# Patient Record
Sex: FEMALE | Race: WHITE | NOT HISPANIC OR LATINO | Employment: OTHER | URBAN - METROPOLITAN AREA
[De-identification: names, ages, dates, MRNs, and addresses within clinical notes are randomized per-mention and may not be internally consistent; named-entity substitution may affect disease eponyms.]

---

## 2017-03-10 ENCOUNTER — OFFICE VISIT - HEALTHEAST (OUTPATIENT)
Dept: INTERNAL MEDICINE | Facility: CLINIC | Age: 77
End: 2017-03-10

## 2017-03-10 DIAGNOSIS — H35.30 MACULAR DEGENERATION: ICD-10-CM

## 2017-03-10 DIAGNOSIS — I10 ESSENTIAL HYPERTENSION WITH GOAL BLOOD PRESSURE LESS THAN 140/90: ICD-10-CM

## 2017-03-10 DIAGNOSIS — F41.9 ANXIETY: ICD-10-CM

## 2017-03-10 DIAGNOSIS — Z51.81 MEDICATION MONITORING ENCOUNTER: ICD-10-CM

## 2017-03-10 DIAGNOSIS — M81.0 OSTEOPOROSIS: ICD-10-CM

## 2017-03-10 ASSESSMENT — MIFFLIN-ST. JEOR: SCORE: 1015.86

## 2017-03-13 ENCOUNTER — COMMUNICATION - HEALTHEAST (OUTPATIENT)
Dept: INFUSION THERAPY | Age: 77
End: 2017-03-13

## 2017-03-13 ENCOUNTER — COMMUNICATION - HEALTHEAST (OUTPATIENT)
Dept: INTERNAL MEDICINE | Facility: CLINIC | Age: 77
End: 2017-03-13

## 2017-03-16 ENCOUNTER — COMMUNICATION - HEALTHEAST (OUTPATIENT)
Dept: INTERNAL MEDICINE | Facility: CLINIC | Age: 77
End: 2017-03-16

## 2017-03-16 DIAGNOSIS — I10 HTN (HYPERTENSION): ICD-10-CM

## 2017-03-17 ENCOUNTER — COMMUNICATION - HEALTHEAST (OUTPATIENT)
Dept: INFUSION THERAPY | Age: 77
End: 2017-03-17

## 2017-03-28 ENCOUNTER — COMMUNICATION - HEALTHEAST (OUTPATIENT)
Dept: INFUSION THERAPY | Age: 77
End: 2017-03-28

## 2017-04-12 ENCOUNTER — INFUSION - HEALTHEAST (OUTPATIENT)
Dept: INFUSION THERAPY | Age: 77
End: 2017-04-12

## 2017-04-12 DIAGNOSIS — M81.0 OSTEOPOROSIS: ICD-10-CM

## 2017-05-04 ENCOUNTER — COMMUNICATION - HEALTHEAST (OUTPATIENT)
Dept: INTERNAL MEDICINE | Facility: CLINIC | Age: 77
End: 2017-05-04

## 2017-05-04 DIAGNOSIS — R53.83 FATIGUE: ICD-10-CM

## 2017-05-19 ENCOUNTER — COMMUNICATION - HEALTHEAST (OUTPATIENT)
Dept: INTERNAL MEDICINE | Facility: CLINIC | Age: 77
End: 2017-05-19

## 2017-07-14 ENCOUNTER — OFFICE VISIT - HEALTHEAST (OUTPATIENT)
Dept: INTERNAL MEDICINE | Facility: CLINIC | Age: 77
End: 2017-07-14

## 2017-07-14 DIAGNOSIS — Z51.81 MEDICATION MONITORING ENCOUNTER: ICD-10-CM

## 2017-07-14 DIAGNOSIS — H35.30 MACULAR DEGENERATION: ICD-10-CM

## 2017-07-14 DIAGNOSIS — I10 ESSENTIAL HYPERTENSION WITH GOAL BLOOD PRESSURE LESS THAN 140/90: ICD-10-CM

## 2017-07-14 DIAGNOSIS — M81.0 OSTEOPOROSIS: ICD-10-CM

## 2017-07-14 DIAGNOSIS — F41.9 ANXIETY: ICD-10-CM

## 2017-07-14 DIAGNOSIS — E78.00 HYPERCHOLESTEROLEMIA: ICD-10-CM

## 2017-07-14 ASSESSMENT — MIFFLIN-ST. JEOR: SCORE: 993.18

## 2017-07-17 ENCOUNTER — COMMUNICATION - HEALTHEAST (OUTPATIENT)
Dept: INTERNAL MEDICINE | Facility: CLINIC | Age: 77
End: 2017-07-17

## 2017-10-03 ENCOUNTER — COMMUNICATION - HEALTHEAST (OUTPATIENT)
Dept: SCHEDULING | Facility: CLINIC | Age: 77
End: 2017-10-03

## 2017-10-03 DIAGNOSIS — F41.9 ANXIETY: ICD-10-CM

## 2017-10-16 ENCOUNTER — COMMUNICATION - HEALTHEAST (OUTPATIENT)
Dept: INTERNAL MEDICINE | Facility: CLINIC | Age: 77
End: 2017-10-16

## 2017-10-16 ENCOUNTER — OFFICE VISIT - HEALTHEAST (OUTPATIENT)
Dept: INTERNAL MEDICINE | Facility: CLINIC | Age: 77
End: 2017-10-16

## 2017-10-16 DIAGNOSIS — I10 ESSENTIAL HYPERTENSION: ICD-10-CM

## 2017-10-16 DIAGNOSIS — Z51.81 MEDICATION MONITORING ENCOUNTER: ICD-10-CM

## 2017-10-16 DIAGNOSIS — I49.9 IRREGULAR HEART RATE: ICD-10-CM

## 2017-10-16 DIAGNOSIS — Z87.891 HISTORY OF TOBACCO ABUSE: ICD-10-CM

## 2017-10-16 DIAGNOSIS — H35.30 MACULAR DEGENERATION: ICD-10-CM

## 2017-10-16 DIAGNOSIS — F41.9 ANXIETY: ICD-10-CM

## 2017-10-16 DIAGNOSIS — M81.0 OSTEOPOROSIS, UNSPECIFIED OSTEOPOROSIS TYPE, UNSPECIFIED PATHOLOGICAL FRACTURE PRESENCE: ICD-10-CM

## 2017-10-16 DIAGNOSIS — J43.9 PULMONARY EMPHYSEMA, UNSPECIFIED EMPHYSEMA TYPE (H): ICD-10-CM

## 2017-10-16 DIAGNOSIS — R53.83 FATIGUE, UNSPECIFIED TYPE: ICD-10-CM

## 2017-10-16 DIAGNOSIS — R06.02 SHORT OF BREATH ON EXERTION: ICD-10-CM

## 2017-10-16 DIAGNOSIS — E55.9 VITAMIN D DEFICIENCY: ICD-10-CM

## 2017-10-16 LAB
ATRIAL RATE - MUSE: 65 BPM
DIASTOLIC BLOOD PRESSURE - MUSE: NORMAL MMHG
INTERPRETATION ECG - MUSE: NORMAL
P AXIS - MUSE: 13 DEGREES
PR INTERVAL - MUSE: 138 MS
QRS DURATION - MUSE: 86 MS
QT - MUSE: 432 MS
QTC - MUSE: 449 MS
R AXIS - MUSE: 75 DEGREES
SYSTOLIC BLOOD PRESSURE - MUSE: NORMAL MMHG
T AXIS - MUSE: 73 DEGREES
VENTRICULAR RATE- MUSE: 65 BPM

## 2017-10-16 ASSESSMENT — MIFFLIN-ST. JEOR: SCORE: 988.65

## 2017-10-17 ENCOUNTER — COMMUNICATION - HEALTHEAST (OUTPATIENT)
Dept: INTERNAL MEDICINE | Facility: CLINIC | Age: 77
End: 2017-10-17

## 2017-10-18 ENCOUNTER — COMMUNICATION - HEALTHEAST (OUTPATIENT)
Dept: INTERNAL MEDICINE | Facility: CLINIC | Age: 77
End: 2017-10-18

## 2017-10-18 DIAGNOSIS — J44.9 CHRONIC OBSTRUCTIVE PULMONARY DISEASE, UNSPECIFIED COPD TYPE (H): ICD-10-CM

## 2017-11-09 ENCOUNTER — COMMUNICATION - HEALTHEAST (OUTPATIENT)
Dept: INTERNAL MEDICINE | Facility: CLINIC | Age: 77
End: 2017-11-09

## 2017-11-09 ENCOUNTER — HOSPITAL ENCOUNTER (OUTPATIENT)
Dept: CT IMAGING | Facility: HOSPITAL | Age: 77
Discharge: HOME | End: 2017-11-09
Attending: INTERNAL MEDICINE

## 2017-11-09 ENCOUNTER — OFFICE VISIT - HEALTHEAST (OUTPATIENT)
Dept: PULMONOLOGY | Facility: OTHER | Age: 77
End: 2017-11-09

## 2017-11-09 DIAGNOSIS — F17.211 CIGARETTE NICOTINE DEPENDENCE IN REMISSION: ICD-10-CM

## 2017-11-09 DIAGNOSIS — Z87.891 HISTORY OF TOBACCO ABUSE: ICD-10-CM

## 2017-11-09 DIAGNOSIS — R06.02 SHORT OF BREATH ON EXERTION: ICD-10-CM

## 2017-11-13 ENCOUNTER — AMBULATORY - HEALTHEAST (OUTPATIENT)
Dept: PULMONOLOGY | Facility: OTHER | Age: 77
End: 2017-11-13

## 2017-11-13 ENCOUNTER — OFFICE VISIT - HEALTHEAST (OUTPATIENT)
Dept: INTERNAL MEDICINE | Facility: CLINIC | Age: 77
End: 2017-11-13

## 2017-11-13 DIAGNOSIS — J43.9 EMPHYSEMA LUNG (H): ICD-10-CM

## 2017-11-13 DIAGNOSIS — I10 ESSENTIAL HYPERTENSION: ICD-10-CM

## 2017-11-13 DIAGNOSIS — J43.9 PULMONARY EMPHYSEMA, UNSPECIFIED EMPHYSEMA TYPE (H): ICD-10-CM

## 2017-11-13 ASSESSMENT — MIFFLIN-ST. JEOR: SCORE: 984.11

## 2017-11-14 ENCOUNTER — COMMUNICATION - HEALTHEAST (OUTPATIENT)
Dept: PULMONOLOGY | Facility: OTHER | Age: 77
End: 2017-11-14

## 2017-12-29 ENCOUNTER — RECORDS - HEALTHEAST (OUTPATIENT)
Dept: ADMINISTRATIVE | Facility: OTHER | Age: 77
End: 2017-12-29

## 2017-12-29 ENCOUNTER — OFFICE VISIT - HEALTHEAST (OUTPATIENT)
Dept: PULMONOLOGY | Facility: OTHER | Age: 77
End: 2017-12-29

## 2017-12-29 DIAGNOSIS — J43.2 CENTRILOBULAR EMPHYSEMA (H): ICD-10-CM

## 2017-12-29 ASSESSMENT — MIFFLIN-ST. JEOR: SCORE: 996.81

## 2018-02-26 ENCOUNTER — OFFICE VISIT - HEALTHEAST (OUTPATIENT)
Dept: PULMONOLOGY | Facility: OTHER | Age: 78
End: 2018-02-26

## 2018-02-26 DIAGNOSIS — J43.2 CENTRILOBULAR EMPHYSEMA (H): ICD-10-CM

## 2018-03-02 ENCOUNTER — COMMUNICATION - HEALTHEAST (OUTPATIENT)
Dept: SCHEDULING | Facility: CLINIC | Age: 78
End: 2018-03-02

## 2018-03-06 ENCOUNTER — COMMUNICATION - HEALTHEAST (OUTPATIENT)
Dept: INTERNAL MEDICINE | Facility: CLINIC | Age: 78
End: 2018-03-06

## 2018-03-06 DIAGNOSIS — I10 HTN (HYPERTENSION): ICD-10-CM

## 2018-03-07 ENCOUNTER — COMMUNICATION - HEALTHEAST (OUTPATIENT)
Dept: INTERNAL MEDICINE | Facility: CLINIC | Age: 78
End: 2018-03-07

## 2018-03-07 DIAGNOSIS — F41.9 ANXIETY: ICD-10-CM

## 2018-03-09 ENCOUNTER — OFFICE VISIT - HEALTHEAST (OUTPATIENT)
Dept: INTERNAL MEDICINE | Facility: CLINIC | Age: 78
End: 2018-03-09

## 2018-03-09 DIAGNOSIS — Z51.81 MEDICATION MONITORING ENCOUNTER: ICD-10-CM

## 2018-03-09 DIAGNOSIS — I10 ESSENTIAL HYPERTENSION: ICD-10-CM

## 2018-03-09 DIAGNOSIS — F41.9 ANXIETY: ICD-10-CM

## 2018-03-09 DIAGNOSIS — J43.9 PULMONARY EMPHYSEMA, UNSPECIFIED EMPHYSEMA TYPE (H): ICD-10-CM

## 2018-03-09 DIAGNOSIS — H35.30 MACULAR DEGENERATION: ICD-10-CM

## 2018-03-09 LAB
ANION GAP SERPL CALCULATED.3IONS-SCNC: 11 MMOL/L (ref 5–18)
BUN SERPL-MCNC: 12 MG/DL (ref 8–28)
CALCIUM SERPL-MCNC: 9 MG/DL (ref 8.5–10.5)
CHLORIDE BLD-SCNC: 99 MMOL/L (ref 98–107)
CO2 SERPL-SCNC: 26 MMOL/L (ref 22–31)
CREAT SERPL-MCNC: 0.75 MG/DL (ref 0.6–1.1)
GFR SERPL CREATININE-BSD FRML MDRD: >60 ML/MIN/1.73M2
GLUCOSE BLD-MCNC: 98 MG/DL (ref 70–125)
POTASSIUM BLD-SCNC: 4.1 MMOL/L (ref 3.5–5)
SODIUM SERPL-SCNC: 136 MMOL/L (ref 136–145)

## 2018-03-09 ASSESSMENT — MIFFLIN-ST. JEOR: SCORE: 979.58

## 2018-03-11 ENCOUNTER — COMMUNICATION - HEALTHEAST (OUTPATIENT)
Dept: INTERNAL MEDICINE | Facility: CLINIC | Age: 78
End: 2018-03-11

## 2018-04-27 ENCOUNTER — COMMUNICATION - HEALTHEAST (OUTPATIENT)
Dept: INTERNAL MEDICINE | Facility: CLINIC | Age: 78
End: 2018-04-27

## 2018-04-27 DIAGNOSIS — R53.83 FATIGUE: ICD-10-CM

## 2018-04-27 DIAGNOSIS — I10 HTN (HYPERTENSION): ICD-10-CM

## 2018-07-26 ENCOUNTER — COMMUNICATION - HEALTHEAST (OUTPATIENT)
Dept: INTERNAL MEDICINE | Facility: CLINIC | Age: 78
End: 2018-07-26

## 2018-07-26 DIAGNOSIS — I10 HTN (HYPERTENSION): ICD-10-CM

## 2018-07-26 DIAGNOSIS — F41.9 ANXIETY: ICD-10-CM

## 2018-07-30 ENCOUNTER — COMMUNICATION - HEALTHEAST (OUTPATIENT)
Dept: INTERNAL MEDICINE | Facility: CLINIC | Age: 78
End: 2018-07-30

## 2018-08-01 ENCOUNTER — OFFICE VISIT - HEALTHEAST (OUTPATIENT)
Dept: INTERNAL MEDICINE | Facility: CLINIC | Age: 78
End: 2018-08-01

## 2018-08-01 DIAGNOSIS — F41.9 ANXIETY: ICD-10-CM

## 2018-08-01 DIAGNOSIS — I10 ESSENTIAL HYPERTENSION: ICD-10-CM

## 2018-08-01 DIAGNOSIS — Z51.81 MEDICATION MONITORING ENCOUNTER: ICD-10-CM

## 2018-08-01 DIAGNOSIS — J43.9 PULMONARY EMPHYSEMA, UNSPECIFIED EMPHYSEMA TYPE (H): ICD-10-CM

## 2018-08-01 LAB
ANION GAP SERPL CALCULATED.3IONS-SCNC: 12 MMOL/L (ref 5–18)
BUN SERPL-MCNC: 5 MG/DL (ref 8–28)
CALCIUM SERPL-MCNC: 9.4 MG/DL (ref 8.5–10.5)
CHLORIDE BLD-SCNC: 99 MMOL/L (ref 98–107)
CO2 SERPL-SCNC: 24 MMOL/L (ref 22–31)
CREAT SERPL-MCNC: 0.75 MG/DL (ref 0.6–1.1)
GFR SERPL CREATININE-BSD FRML MDRD: >60 ML/MIN/1.73M2
GLUCOSE BLD-MCNC: 113 MG/DL (ref 70–125)
POTASSIUM BLD-SCNC: 4.2 MMOL/L (ref 3.5–5)
SODIUM SERPL-SCNC: 135 MMOL/L (ref 136–145)

## 2018-08-01 ASSESSMENT — MIFFLIN-ST. JEOR: SCORE: 997.72

## 2018-08-02 ENCOUNTER — COMMUNICATION - HEALTHEAST (OUTPATIENT)
Dept: INTERNAL MEDICINE | Facility: CLINIC | Age: 78
End: 2018-08-02

## 2018-08-10 ENCOUNTER — COMMUNICATION - HEALTHEAST (OUTPATIENT)
Dept: INTERNAL MEDICINE | Facility: CLINIC | Age: 78
End: 2018-08-10

## 2018-08-15 ENCOUNTER — OFFICE VISIT - HEALTHEAST (OUTPATIENT)
Dept: INTERNAL MEDICINE | Facility: CLINIC | Age: 78
End: 2018-08-15

## 2018-08-15 DIAGNOSIS — I10 HTN (HYPERTENSION): ICD-10-CM

## 2018-08-15 DIAGNOSIS — N39.0 URINARY TRACT INFECTION WITHOUT HEMATURIA, SITE UNSPECIFIED: ICD-10-CM

## 2018-08-15 DIAGNOSIS — J43.9 PULMONARY EMPHYSEMA, UNSPECIFIED EMPHYSEMA TYPE (H): ICD-10-CM

## 2018-08-15 ASSESSMENT — MIFFLIN-ST. JEOR: SCORE: 1006.79

## 2018-09-25 ENCOUNTER — OFFICE VISIT - HEALTHEAST (OUTPATIENT)
Dept: INTERNAL MEDICINE | Facility: CLINIC | Age: 78
End: 2018-09-25

## 2018-09-25 DIAGNOSIS — Z51.81 MEDICATION MONITORING ENCOUNTER: ICD-10-CM

## 2018-09-25 DIAGNOSIS — F41.9 ANXIETY: ICD-10-CM

## 2018-09-25 DIAGNOSIS — H35.30 MACULAR DEGENERATION: ICD-10-CM

## 2018-09-25 DIAGNOSIS — E87.1 HYPONATREMIA: ICD-10-CM

## 2018-09-25 DIAGNOSIS — J43.9 PULMONARY EMPHYSEMA, UNSPECIFIED EMPHYSEMA TYPE (H): ICD-10-CM

## 2018-09-25 DIAGNOSIS — I10 HTN (HYPERTENSION): ICD-10-CM

## 2018-09-25 LAB
ANION GAP SERPL CALCULATED.3IONS-SCNC: 10 MMOL/L (ref 5–18)
BUN SERPL-MCNC: 8 MG/DL (ref 8–28)
CALCIUM SERPL-MCNC: 9.5 MG/DL (ref 8.5–10.5)
CHLORIDE BLD-SCNC: 97 MMOL/L (ref 98–107)
CO2 SERPL-SCNC: 26 MMOL/L (ref 22–31)
CREAT SERPL-MCNC: 0.77 MG/DL (ref 0.6–1.1)
GFR SERPL CREATININE-BSD FRML MDRD: >60 ML/MIN/1.73M2
GLUCOSE BLD-MCNC: 113 MG/DL (ref 70–125)
POTASSIUM BLD-SCNC: 4.4 MMOL/L (ref 3.5–5)
SODIUM SERPL-SCNC: 133 MMOL/L (ref 136–145)

## 2018-09-25 ASSESSMENT — MIFFLIN-ST. JEOR: SCORE: 1002.26

## 2018-09-26 ENCOUNTER — COMMUNICATION - HEALTHEAST (OUTPATIENT)
Dept: INTERNAL MEDICINE | Facility: CLINIC | Age: 78
End: 2018-09-26

## 2018-10-09 ENCOUNTER — COMMUNICATION - HEALTHEAST (OUTPATIENT)
Dept: SCHEDULING | Facility: CLINIC | Age: 78
End: 2018-10-09

## 2018-10-09 DIAGNOSIS — J44.1 COPD EXACERBATION (H): ICD-10-CM

## 2018-11-07 ENCOUNTER — OFFICE VISIT - HEALTHEAST (OUTPATIENT)
Dept: PULMONOLOGY | Facility: OTHER | Age: 78
End: 2018-11-07

## 2018-11-07 DIAGNOSIS — J20.9 ACUTE BRONCHITIS, UNSPECIFIED ORGANISM: ICD-10-CM

## 2018-11-07 DIAGNOSIS — J43.2 CENTRILOBULAR EMPHYSEMA (H): ICD-10-CM

## 2018-11-07 DIAGNOSIS — J43.9 PULMONARY EMPHYSEMA, UNSPECIFIED EMPHYSEMA TYPE (H): ICD-10-CM

## 2018-12-19 ENCOUNTER — OFFICE VISIT - HEALTHEAST (OUTPATIENT)
Dept: PULMONOLOGY | Facility: OTHER | Age: 78
End: 2018-12-19

## 2018-12-19 DIAGNOSIS — R91.8 PULMONARY NODULES: ICD-10-CM

## 2018-12-19 DIAGNOSIS — J43.2 CENTRILOBULAR EMPHYSEMA (H): ICD-10-CM

## 2018-12-19 ASSESSMENT — MIFFLIN-ST. JEOR: SCORE: 1015.86

## 2018-12-31 ENCOUNTER — AMBULATORY - HEALTHEAST (OUTPATIENT)
Dept: PULMONOLOGY | Facility: OTHER | Age: 78
End: 2018-12-31

## 2018-12-31 DIAGNOSIS — F17.211 CIGARETTE NICOTINE DEPENDENCE IN REMISSION: ICD-10-CM

## 2018-12-31 DIAGNOSIS — F17.210 CIGARETTE NICOTINE DEPENDENCE WITHOUT COMPLICATION: ICD-10-CM

## 2018-12-31 DIAGNOSIS — F17.210 CIGARETTE NICOTINE DEPENDENCE, UNCOMPLICATED: ICD-10-CM

## 2018-12-31 DIAGNOSIS — F17.201 NICOTINE DEPENDENCE IN REMISSION, UNSPECIFIED NICOTINE PRODUCT TYPE: ICD-10-CM

## 2019-01-22 ENCOUNTER — COMMUNICATION - HEALTHEAST (OUTPATIENT)
Dept: INTERNAL MEDICINE | Facility: CLINIC | Age: 79
End: 2019-01-22

## 2019-01-28 ENCOUNTER — COMMUNICATION - HEALTHEAST (OUTPATIENT)
Dept: INTERNAL MEDICINE | Facility: CLINIC | Age: 79
End: 2019-01-28

## 2019-01-28 DIAGNOSIS — F41.9 ANXIETY: ICD-10-CM

## 2019-02-26 ENCOUNTER — OFFICE VISIT - HEALTHEAST (OUTPATIENT)
Dept: INTERNAL MEDICINE | Facility: CLINIC | Age: 79
End: 2019-02-26

## 2019-02-26 DIAGNOSIS — J43.9 PULMONARY EMPHYSEMA, UNSPECIFIED EMPHYSEMA TYPE (H): ICD-10-CM

## 2019-02-26 DIAGNOSIS — Z51.81 MEDICATION MONITORING ENCOUNTER: ICD-10-CM

## 2019-02-26 DIAGNOSIS — E55.9 VITAMIN D DEFICIENCY: ICD-10-CM

## 2019-02-26 DIAGNOSIS — H35.30 MACULAR DEGENERATION, UNSPECIFIED LATERALITY, UNSPECIFIED TYPE: ICD-10-CM

## 2019-02-26 DIAGNOSIS — M81.0 OSTEOPOROSIS, UNSPECIFIED OSTEOPOROSIS TYPE, UNSPECIFIED PATHOLOGICAL FRACTURE PRESENCE: ICD-10-CM

## 2019-02-26 DIAGNOSIS — I10 ESSENTIAL HYPERTENSION: ICD-10-CM

## 2019-02-26 LAB
ANION GAP SERPL CALCULATED.3IONS-SCNC: 15 MMOL/L (ref 5–18)
BUN SERPL-MCNC: 8 MG/DL (ref 8–28)
CALCIUM SERPL-MCNC: 9.4 MG/DL (ref 8.5–10.5)
CHLORIDE BLD-SCNC: 99 MMOL/L (ref 98–107)
CO2 SERPL-SCNC: 21 MMOL/L (ref 22–31)
CREAT SERPL-MCNC: 0.86 MG/DL (ref 0.6–1.1)
ERYTHROCYTE [DISTWIDTH] IN BLOOD BY AUTOMATED COUNT: 12.3 % (ref 11–14.5)
GFR SERPL CREATININE-BSD FRML MDRD: >60 ML/MIN/1.73M2
GLUCOSE BLD-MCNC: 110 MG/DL (ref 70–125)
HCT VFR BLD AUTO: 41.4 % (ref 35–47)
HGB BLD-MCNC: 13.6 G/DL (ref 12–16)
MCH RBC QN AUTO: 31.1 PG (ref 27–34)
MCHC RBC AUTO-ENTMCNC: 32.9 G/DL (ref 32–36)
MCV RBC AUTO: 95 FL (ref 80–100)
PLATELET # BLD AUTO: 335 THOU/UL (ref 140–440)
PMV BLD AUTO: 7.2 FL (ref 7–10)
POTASSIUM BLD-SCNC: 4.4 MMOL/L (ref 3.5–5)
RBC # BLD AUTO: 4.37 MILL/UL (ref 3.8–5.4)
SODIUM SERPL-SCNC: 135 MMOL/L (ref 136–145)
WBC: 13.4 THOU/UL (ref 4–11)

## 2019-02-27 LAB — 25(OH)D3 SERPL-MCNC: 21.4 NG/ML (ref 30–80)

## 2019-02-28 ENCOUNTER — COMMUNICATION - HEALTHEAST (OUTPATIENT)
Dept: INTERNAL MEDICINE | Facility: CLINIC | Age: 79
End: 2019-02-28

## 2019-03-05 ENCOUNTER — COMMUNICATION - HEALTHEAST (OUTPATIENT)
Dept: INTERNAL MEDICINE | Facility: CLINIC | Age: 79
End: 2019-03-05

## 2019-03-05 DIAGNOSIS — I10 HTN (HYPERTENSION): ICD-10-CM

## 2019-03-19 ENCOUNTER — AMBULATORY - HEALTHEAST (OUTPATIENT)
Dept: PULMONOLOGY | Facility: OTHER | Age: 79
End: 2019-03-19

## 2019-03-19 ENCOUNTER — HOSPITAL ENCOUNTER (OUTPATIENT)
Dept: CT IMAGING | Facility: HOSPITAL | Age: 79
Discharge: HOME | End: 2019-03-19
Attending: INTERNAL MEDICINE

## 2019-03-19 DIAGNOSIS — F17.210 CIGARETTE NICOTINE DEPENDENCE, UNCOMPLICATED: ICD-10-CM

## 2019-03-27 ENCOUNTER — OFFICE VISIT - HEALTHEAST (OUTPATIENT)
Dept: PULMONOLOGY | Facility: OTHER | Age: 79
End: 2019-03-27

## 2019-03-27 DIAGNOSIS — J43.2 CENTRILOBULAR EMPHYSEMA (H): ICD-10-CM

## 2019-03-27 ASSESSMENT — MIFFLIN-ST. JEOR: SCORE: 1002.26

## 2019-04-29 ENCOUNTER — COMMUNICATION - HEALTHEAST (OUTPATIENT)
Dept: INTERNAL MEDICINE | Facility: CLINIC | Age: 79
End: 2019-04-29

## 2019-04-29 DIAGNOSIS — R53.83 FATIGUE: ICD-10-CM

## 2019-05-25 ENCOUNTER — COMMUNICATION - HEALTHEAST (OUTPATIENT)
Dept: INTERNAL MEDICINE | Facility: CLINIC | Age: 79
End: 2019-05-25

## 2019-05-25 DIAGNOSIS — R11.0 NAUSEA: ICD-10-CM

## 2019-07-02 ENCOUNTER — OFFICE VISIT - HEALTHEAST (OUTPATIENT)
Dept: INTERNAL MEDICINE | Facility: CLINIC | Age: 79
End: 2019-07-02

## 2019-07-02 DIAGNOSIS — I10 ESSENTIAL HYPERTENSION: ICD-10-CM

## 2019-07-02 DIAGNOSIS — Z78.0 POSTMENOPAUSAL STATUS: ICD-10-CM

## 2019-07-02 DIAGNOSIS — E55.9 VITAMIN D DEFICIENCY: ICD-10-CM

## 2019-07-02 DIAGNOSIS — F41.9 ANXIETY: ICD-10-CM

## 2019-07-02 DIAGNOSIS — H35.30 MACULAR DEGENERATION, UNSPECIFIED LATERALITY, UNSPECIFIED TYPE: ICD-10-CM

## 2019-07-02 DIAGNOSIS — Z79.52 LONG TERM SYSTEMIC STEROID USER: ICD-10-CM

## 2019-07-02 DIAGNOSIS — M81.0 OSTEOPOROSIS, UNSPECIFIED OSTEOPOROSIS TYPE, UNSPECIFIED PATHOLOGICAL FRACTURE PRESENCE: ICD-10-CM

## 2019-07-02 DIAGNOSIS — R11.0 NAUSEA: ICD-10-CM

## 2019-07-02 DIAGNOSIS — E87.1 HYPONATREMIA: ICD-10-CM

## 2019-07-02 DIAGNOSIS — J43.9 PULMONARY EMPHYSEMA, UNSPECIFIED EMPHYSEMA TYPE (H): ICD-10-CM

## 2019-07-02 LAB
ANION GAP SERPL CALCULATED.3IONS-SCNC: 9 MMOL/L (ref 5–18)
BUN SERPL-MCNC: 7 MG/DL (ref 8–28)
CALCIUM SERPL-MCNC: 10.2 MG/DL (ref 8.5–10.5)
CHLORIDE BLD-SCNC: 96 MMOL/L (ref 98–107)
CO2 SERPL-SCNC: 29 MMOL/L (ref 22–31)
CREAT SERPL-MCNC: 0.82 MG/DL (ref 0.6–1.1)
GFR SERPL CREATININE-BSD FRML MDRD: >60 ML/MIN/1.73M2
GLUCOSE BLD-MCNC: 134 MG/DL (ref 70–125)
POTASSIUM BLD-SCNC: 4.2 MMOL/L (ref 3.5–5)
SODIUM SERPL-SCNC: 134 MMOL/L (ref 136–145)

## 2019-07-03 ENCOUNTER — COMMUNICATION - HEALTHEAST (OUTPATIENT)
Dept: INTERNAL MEDICINE | Facility: CLINIC | Age: 79
End: 2019-07-03

## 2019-07-03 LAB — 25(OH)D3 SERPL-MCNC: 25.8 NG/ML (ref 30–80)

## 2019-08-15 ENCOUNTER — OFFICE VISIT - HEALTHEAST (OUTPATIENT)
Dept: PULMONOLOGY | Facility: OTHER | Age: 79
End: 2019-08-15

## 2019-08-15 DIAGNOSIS — J43.2 CENTRILOBULAR EMPHYSEMA (H): ICD-10-CM

## 2019-08-26 ENCOUNTER — COMMUNICATION - HEALTHEAST (OUTPATIENT)
Dept: INTERNAL MEDICINE | Facility: CLINIC | Age: 79
End: 2019-08-26

## 2019-08-26 DIAGNOSIS — I10 HTN (HYPERTENSION): ICD-10-CM

## 2019-08-26 DIAGNOSIS — R53.83 FATIGUE: ICD-10-CM

## 2019-10-03 ENCOUNTER — OFFICE VISIT - HEALTHEAST (OUTPATIENT)
Dept: INTERNAL MEDICINE | Facility: CLINIC | Age: 79
End: 2019-10-03

## 2019-10-03 DIAGNOSIS — J43.9 PULMONARY EMPHYSEMA, UNSPECIFIED EMPHYSEMA TYPE (H): ICD-10-CM

## 2019-10-03 DIAGNOSIS — E55.9 VITAMIN D DEFICIENCY: ICD-10-CM

## 2019-10-03 DIAGNOSIS — Z23 NEED FOR IMMUNIZATION AGAINST INFLUENZA: ICD-10-CM

## 2019-10-03 DIAGNOSIS — E87.1 HYPONATREMIA: ICD-10-CM

## 2019-10-03 DIAGNOSIS — I10 ESSENTIAL HYPERTENSION: ICD-10-CM

## 2019-10-03 DIAGNOSIS — F41.9 ANXIETY: ICD-10-CM

## 2019-10-03 DIAGNOSIS — H35.30 MACULAR DEGENERATION, UNSPECIFIED LATERALITY, UNSPECIFIED TYPE: ICD-10-CM

## 2019-11-22 ENCOUNTER — AMBULATORY - HEALTHEAST (OUTPATIENT)
Dept: PULMONOLOGY | Facility: OTHER | Age: 79
End: 2019-11-22

## 2019-11-22 DIAGNOSIS — J43.2 CENTRILOBULAR EMPHYSEMA (H): ICD-10-CM

## 2019-12-26 ENCOUNTER — OFFICE VISIT - HEALTHEAST (OUTPATIENT)
Dept: INTERNAL MEDICINE | Facility: CLINIC | Age: 79
End: 2019-12-26

## 2019-12-26 DIAGNOSIS — I10 ESSENTIAL HYPERTENSION: ICD-10-CM

## 2019-12-26 DIAGNOSIS — E87.1 HYPONATREMIA: ICD-10-CM

## 2019-12-26 DIAGNOSIS — H35.30 MACULAR DEGENERATION, UNSPECIFIED LATERALITY, UNSPECIFIED TYPE: ICD-10-CM

## 2019-12-26 DIAGNOSIS — E55.9 VITAMIN D DEFICIENCY: ICD-10-CM

## 2019-12-26 DIAGNOSIS — R53.83 FATIGUE, UNSPECIFIED TYPE: ICD-10-CM

## 2019-12-26 DIAGNOSIS — F41.9 ANXIETY: ICD-10-CM

## 2019-12-26 DIAGNOSIS — Z51.81 MEDICATION MONITORING ENCOUNTER: ICD-10-CM

## 2019-12-26 DIAGNOSIS — J43.9 PULMONARY EMPHYSEMA, UNSPECIFIED EMPHYSEMA TYPE (H): ICD-10-CM

## 2019-12-26 LAB
ALBUMIN SERPL-MCNC: 4.1 G/DL (ref 3.5–5)
ALP SERPL-CCNC: 60 U/L (ref 45–120)
ALT SERPL W P-5'-P-CCNC: 19 U/L (ref 0–45)
ANION GAP SERPL CALCULATED.3IONS-SCNC: 16 MMOL/L (ref 5–18)
AST SERPL W P-5'-P-CCNC: 21 U/L (ref 0–40)
BILIRUB SERPL-MCNC: 0.5 MG/DL (ref 0–1)
BUN SERPL-MCNC: 13 MG/DL (ref 8–28)
CALCIUM SERPL-MCNC: 9.4 MG/DL (ref 8.5–10.5)
CHLORIDE BLD-SCNC: 94 MMOL/L (ref 98–107)
CO2 SERPL-SCNC: 28 MMOL/L (ref 22–31)
CREAT SERPL-MCNC: 0.89 MG/DL (ref 0.6–1.1)
ERYTHROCYTE [DISTWIDTH] IN BLOOD BY AUTOMATED COUNT: 12.6 % (ref 11–14.5)
GFR SERPL CREATININE-BSD FRML MDRD: >60 ML/MIN/1.73M2
GLUCOSE BLD-MCNC: 123 MG/DL (ref 70–125)
HCT VFR BLD AUTO: 43.9 % (ref 35–47)
HGB BLD-MCNC: 14.1 G/DL (ref 12–16)
MCH RBC QN AUTO: 29.6 PG (ref 27–34)
MCHC RBC AUTO-ENTMCNC: 32 G/DL (ref 32–36)
MCV RBC AUTO: 92 FL (ref 80–100)
PLATELET # BLD AUTO: 433 THOU/UL (ref 140–440)
PMV BLD AUTO: 7.2 FL (ref 7–10)
POTASSIUM BLD-SCNC: 3.3 MMOL/L (ref 3.5–5)
PROT SERPL-MCNC: 6.9 G/DL (ref 6–8)
RBC # BLD AUTO: 4.75 MILL/UL (ref 3.8–5.4)
SODIUM SERPL-SCNC: 138 MMOL/L (ref 136–145)
TSH SERPL DL<=0.005 MIU/L-ACNC: 2.21 UIU/ML (ref 0.3–5)
WBC: 12.9 THOU/UL (ref 4–11)

## 2019-12-27 ENCOUNTER — COMMUNICATION - HEALTHEAST (OUTPATIENT)
Dept: INTERNAL MEDICINE | Facility: CLINIC | Age: 79
End: 2019-12-27

## 2020-02-10 ENCOUNTER — AMBULATORY - HEALTHEAST (OUTPATIENT)
Dept: PULMONOLOGY | Facility: OTHER | Age: 80
End: 2020-02-10

## 2020-02-10 DIAGNOSIS — J43.9 PULMONARY EMPHYSEMA, UNSPECIFIED EMPHYSEMA TYPE (H): ICD-10-CM

## 2020-02-28 ENCOUNTER — COMMUNICATION - HEALTHEAST (OUTPATIENT)
Dept: INTERNAL MEDICINE | Facility: CLINIC | Age: 80
End: 2020-02-28

## 2020-02-28 DIAGNOSIS — I10 HTN (HYPERTENSION): ICD-10-CM

## 2020-04-13 ENCOUNTER — COMMUNICATION - HEALTHEAST (OUTPATIENT)
Dept: INTERNAL MEDICINE | Facility: CLINIC | Age: 80
End: 2020-04-13

## 2020-04-13 DIAGNOSIS — F41.9 ANXIETY: ICD-10-CM

## 2020-07-23 ENCOUNTER — OFFICE VISIT - HEALTHEAST (OUTPATIENT)
Dept: INTERNAL MEDICINE | Facility: CLINIC | Age: 80
End: 2020-07-23

## 2020-07-23 DIAGNOSIS — F41.9 ANXIETY: ICD-10-CM

## 2020-07-23 DIAGNOSIS — E55.9 VITAMIN D DEFICIENCY: ICD-10-CM

## 2020-07-23 DIAGNOSIS — Z51.81 ENCOUNTER FOR THERAPEUTIC DRUG MONITORING: ICD-10-CM

## 2020-07-23 DIAGNOSIS — J43.9 PULMONARY EMPHYSEMA, UNSPECIFIED EMPHYSEMA TYPE (H): ICD-10-CM

## 2020-07-23 DIAGNOSIS — I10 ESSENTIAL HYPERTENSION: ICD-10-CM

## 2020-07-23 DIAGNOSIS — H35.30 MACULAR DEGENERATION, UNSPECIFIED LATERALITY, UNSPECIFIED TYPE: ICD-10-CM

## 2020-07-23 DIAGNOSIS — E87.1 HYPONATREMIA: ICD-10-CM

## 2020-07-23 RX ORDER — SODIUM CHLORIDE 1 G/1
1 TABLET ORAL DAILY
Refills: 0 | Status: SHIPPED
Start: 2020-07-23 | End: 2021-09-17

## 2020-07-27 ENCOUNTER — COMMUNICATION - HEALTHEAST (OUTPATIENT)
Dept: INTERNAL MEDICINE | Facility: CLINIC | Age: 80
End: 2020-07-27

## 2020-07-27 DIAGNOSIS — F41.9 ANXIETY: ICD-10-CM

## 2020-07-30 ENCOUNTER — COMMUNICATION - HEALTHEAST (OUTPATIENT)
Dept: INTERNAL MEDICINE | Facility: CLINIC | Age: 80
End: 2020-07-30

## 2020-07-31 ENCOUNTER — COMMUNICATION - HEALTHEAST (OUTPATIENT)
Dept: INTERNAL MEDICINE | Facility: CLINIC | Age: 80
End: 2020-07-31

## 2020-07-31 DIAGNOSIS — F41.9 ANXIETY: ICD-10-CM

## 2020-08-03 ENCOUNTER — AMBULATORY - HEALTHEAST (OUTPATIENT)
Dept: LAB | Facility: CLINIC | Age: 80
End: 2020-08-03

## 2020-08-03 DIAGNOSIS — Z51.81 ENCOUNTER FOR THERAPEUTIC DRUG MONITORING: ICD-10-CM

## 2020-08-03 DIAGNOSIS — E87.1 HYPONATREMIA: ICD-10-CM

## 2020-08-03 LAB
ANION GAP SERPL CALCULATED.3IONS-SCNC: 10 MMOL/L (ref 5–18)
BUN SERPL-MCNC: 12 MG/DL (ref 8–28)
CALCIUM SERPL-MCNC: 9.3 MG/DL (ref 8.5–10.5)
CHLORIDE BLD-SCNC: 101 MMOL/L (ref 98–107)
CO2 SERPL-SCNC: 26 MMOL/L (ref 22–31)
CREAT SERPL-MCNC: 0.84 MG/DL (ref 0.6–1.1)
GFR SERPL CREATININE-BSD FRML MDRD: >60 ML/MIN/1.73M2
GLUCOSE BLD-MCNC: 136 MG/DL (ref 70–125)
POTASSIUM BLD-SCNC: 4.6 MMOL/L (ref 3.5–5)
SODIUM SERPL-SCNC: 137 MMOL/L (ref 136–145)

## 2020-08-04 ENCOUNTER — OFFICE VISIT - HEALTHEAST (OUTPATIENT)
Dept: PULMONOLOGY | Facility: OTHER | Age: 80
End: 2020-08-04

## 2020-08-04 ENCOUNTER — COMMUNICATION - HEALTHEAST (OUTPATIENT)
Dept: INTERNAL MEDICINE | Facility: CLINIC | Age: 80
End: 2020-08-04

## 2020-08-04 DIAGNOSIS — J44.9 COPD, GROUP B, BY GOLD 2017 CLASSIFICATION (H): ICD-10-CM

## 2020-08-04 ASSESSMENT — MIFFLIN-ST. JEOR: SCORE: 962.56

## 2020-08-26 ENCOUNTER — COMMUNICATION - HEALTHEAST (OUTPATIENT)
Dept: INTERNAL MEDICINE | Facility: CLINIC | Age: 80
End: 2020-08-26

## 2020-08-26 DIAGNOSIS — R53.83 FATIGUE: ICD-10-CM

## 2020-10-22 ENCOUNTER — COMMUNICATION - HEALTHEAST (OUTPATIENT)
Dept: INTERNAL MEDICINE | Facility: CLINIC | Age: 80
End: 2020-10-22

## 2020-10-22 DIAGNOSIS — I10 HTN (HYPERTENSION): ICD-10-CM

## 2020-10-23 RX ORDER — LISINOPRIL 40 MG/1
40 TABLET ORAL DAILY
Qty: 90 TABLET | Refills: 2 | Status: SHIPPED | OUTPATIENT
Start: 2020-10-23 | End: 2021-11-12

## 2020-11-18 ENCOUNTER — OFFICE VISIT - HEALTHEAST (OUTPATIENT)
Dept: INTERNAL MEDICINE | Facility: CLINIC | Age: 80
End: 2020-11-18

## 2020-11-18 DIAGNOSIS — H35.30 MACULAR DEGENERATION, UNSPECIFIED LATERALITY, UNSPECIFIED TYPE: ICD-10-CM

## 2020-11-18 DIAGNOSIS — J43.9 PULMONARY EMPHYSEMA, UNSPECIFIED EMPHYSEMA TYPE (H): ICD-10-CM

## 2020-11-18 DIAGNOSIS — E55.9 VITAMIN D DEFICIENCY: ICD-10-CM

## 2020-11-18 DIAGNOSIS — R11.0 NAUSEA: ICD-10-CM

## 2020-11-18 DIAGNOSIS — F41.9 ANXIETY: ICD-10-CM

## 2020-11-18 DIAGNOSIS — I10 ESSENTIAL HYPERTENSION: ICD-10-CM

## 2020-11-18 RX ORDER — ONDANSETRON 4 MG/1
4 TABLET, ORALLY DISINTEGRATING ORAL EVERY 8 HOURS PRN
Qty: 30 TABLET | Refills: 1 | Status: SHIPPED | OUTPATIENT
Start: 2020-11-18 | End: 2023-01-01

## 2020-11-18 ASSESSMENT — PATIENT HEALTH QUESTIONNAIRE - PHQ9: SUM OF ALL RESPONSES TO PHQ QUESTIONS 1-9: 0

## 2020-12-10 ENCOUNTER — COMMUNICATION - HEALTHEAST (OUTPATIENT)
Dept: INTERNAL MEDICINE | Facility: CLINIC | Age: 80
End: 2020-12-10

## 2020-12-21 ENCOUNTER — AMBULATORY - HEALTHEAST (OUTPATIENT)
Dept: PULMONOLOGY | Facility: OTHER | Age: 80
End: 2020-12-21

## 2020-12-21 DIAGNOSIS — J43.2 CENTRILOBULAR EMPHYSEMA (H): ICD-10-CM

## 2020-12-21 RX ORDER — BUDESONIDE AND FORMOTEROL FUMARATE DIHYDRATE 160; 4.5 UG/1; UG/1
2 AEROSOL RESPIRATORY (INHALATION) 2 TIMES DAILY
Qty: 1 INHALER | Refills: 12 | Status: SHIPPED | OUTPATIENT
Start: 2020-12-21 | End: 2022-01-28

## 2021-01-22 ENCOUNTER — COMMUNICATION - HEALTHEAST (OUTPATIENT)
Dept: INTERNAL MEDICINE | Facility: CLINIC | Age: 81
End: 2021-01-22

## 2021-01-22 DIAGNOSIS — F41.9 ANXIETY: ICD-10-CM

## 2021-01-22 DIAGNOSIS — J43.9 PULMONARY EMPHYSEMA, UNSPECIFIED EMPHYSEMA TYPE (H): ICD-10-CM

## 2021-02-25 ENCOUNTER — COMMUNICATION - HEALTHEAST (OUTPATIENT)
Dept: INTERNAL MEDICINE | Facility: CLINIC | Age: 81
End: 2021-02-25

## 2021-02-25 DIAGNOSIS — I10 HTN (HYPERTENSION): ICD-10-CM

## 2021-02-25 RX ORDER — AMLODIPINE BESYLATE 10 MG/1
10 TABLET ORAL DAILY
Qty: 90 TABLET | Refills: 3 | Status: SHIPPED | OUTPATIENT
Start: 2021-02-25 | End: 2022-01-31

## 2021-03-21 ENCOUNTER — COMMUNICATION - HEALTHEAST (OUTPATIENT)
Dept: INTERNAL MEDICINE | Facility: CLINIC | Age: 81
End: 2021-03-21

## 2021-03-23 ENCOUNTER — OFFICE VISIT - HEALTHEAST (OUTPATIENT)
Dept: INTERNAL MEDICINE | Facility: CLINIC | Age: 81
End: 2021-03-23

## 2021-03-23 DIAGNOSIS — J43.9 PULMONARY EMPHYSEMA, UNSPECIFIED EMPHYSEMA TYPE (H): ICD-10-CM

## 2021-03-23 DIAGNOSIS — H35.30 MACULAR DEGENERATION, UNSPECIFIED LATERALITY, UNSPECIFIED TYPE: ICD-10-CM

## 2021-03-23 DIAGNOSIS — F41.9 ANXIETY: ICD-10-CM

## 2021-03-23 DIAGNOSIS — I10 ESSENTIAL HYPERTENSION: ICD-10-CM

## 2021-03-23 RX ORDER — AZITHROMYCIN 250 MG/1
TABLET, FILM COATED ORAL
Qty: 6 TABLET | Refills: 0 | Status: SHIPPED | OUTPATIENT
Start: 2021-03-23 | End: 2021-09-17

## 2021-04-09 ENCOUNTER — AMBULATORY - HEALTHEAST (OUTPATIENT)
Dept: PULMONOLOGY | Facility: OTHER | Age: 81
End: 2021-04-09

## 2021-04-09 DIAGNOSIS — J43.9 PULMONARY EMPHYSEMA, UNSPECIFIED EMPHYSEMA TYPE (H): ICD-10-CM

## 2021-04-09 RX ORDER — ALBUTEROL SULFATE 90 UG/1
2 AEROSOL, METERED RESPIRATORY (INHALATION) EVERY 6 HOURS PRN
Qty: 1 EACH | Refills: 6 | Status: SHIPPED | OUTPATIENT
Start: 2021-04-09 | End: 2022-11-28

## 2021-05-03 ENCOUNTER — COMMUNICATION - HEALTHEAST (OUTPATIENT)
Dept: INTERNAL MEDICINE | Facility: CLINIC | Age: 81
End: 2021-05-03

## 2021-05-03 DIAGNOSIS — F41.9 ANXIETY: ICD-10-CM

## 2021-05-03 DIAGNOSIS — J43.9 PULMONARY EMPHYSEMA, UNSPECIFIED EMPHYSEMA TYPE (H): ICD-10-CM

## 2021-05-03 RX ORDER — PREDNISONE 20 MG/1
TABLET ORAL
Qty: 6 TABLET | Refills: 0 | Status: SHIPPED | OUTPATIENT
Start: 2021-05-03 | End: 2021-08-13

## 2021-05-03 RX ORDER — LORAZEPAM 0.5 MG/1
0.5 TABLET ORAL 2 TIMES DAILY
Qty: 30 TABLET | Refills: 0 | Status: SHIPPED | OUTPATIENT
Start: 2021-05-03 | End: 2021-09-17

## 2021-05-27 ASSESSMENT — PATIENT HEALTH QUESTIONNAIRE - PHQ9: SUM OF ALL RESPONSES TO PHQ QUESTIONS 1-9: 0

## 2021-05-27 NOTE — PATIENT INSTRUCTIONS - HE
Stay on the symbicort, twice a day.    I will send in the azithromycin and prednisone for 5 days if your breathing worsens.

## 2021-05-27 NOTE — PROGRESS NOTES
Pulmonary Follow Up Note  3/27/2019    Referring Physician: Dr. Gaines  Reason for Follow Up: COPD      Problem List:     Patient Active Problem List   Diagnosis     Macular degeneration     Osteoporosis     HTN (hypertension)     Hypercholesterolemia     Medication monitoring encounter     Vitamin D deficiency     Essential hypertension     Pulmonary emphysema, unspecified emphysema type (H)     Nicotine dependence in remission     Anxiety           History:     Mrs. Celeste Peguero is a 77 yo female wo presents to pulmonary clinic for evaluation of emphysema and likely COPD.  She originally underwent a lung cancer screening CT and was noted to have significant emphysema.  She was then referred here.  On review she does note that she has STORY, but this is mostly with extreme exertion.  She doesn't really note any significant wheezing or chest tightness.  She does note that high humidity and extreme cold temperatures make her breathing worse.  She does have a >30 pk/yr smoking history, but quit in 2010.  She has not had any history of exacerbations or any history of use of prednisone or antibiotics.  She does get a yearly influenza vaccination.    She was started on Incruse by Dr. Gaines as spririva was not on her formulary and she developed a severe anaphylactoid reaction with lip and tongue swelling.  She has subsequently been maintained on symbicort two times a day.  And albuterol as needed.    Since being seen last, she has been doing well.  Her breathing is very stable.  She does note that about 2 weeks ago she feels that she had overdone things and her breathing worsened.  This did not last long though.  She denies any chest tightness, increased cough, voice hoarseness, thrush symptoms.  She has had no significant side effects from the low dose prednisone, but she is not sure that it ultimately helped her.      Past Medical History:   Diagnosis Date     Acute H. pylori gastric ulcer 2010    Treated with no  further Sx     Anxiety and depression     Stabilized on prozac 20mg qday.  Uses prn lorazepam.     Hiatal hernia      HTN (hypertension)      Hyponatremia     severe/ hospitalized when Tx for H. pylori/ ulcer.  No longer on HCTZ.     Macular degeneration     Wet with submacular hem R eye.  Avastin shots given x4  - .     Osteoporosis      DEXA S-1.9/F-3.5.  alendronate for two years, then teeth fell out.  Prolia x2 infections,then Reclast started .  No fracture Hx.       Shingles 2009    Thorasic, no post herpetic neuralgia.     Past Surgical History:   Procedure Laterality Date     TONSILECTOMY, ADENOIDECTOMY, BILATERAL MYRINGOTOMY AND TUBES      No other surgeries.       Social History     Socioeconomic History     Marital status:      Spouse name: Not on file     Number of children: Not on file     Years of education: Not on file     Highest education level: Not on file   Occupational History     Not on file   Social Needs     Financial resource strain: Not on file     Food insecurity:     Worry: Not on file     Inability: Not on file     Transportation needs:     Medical: Not on file     Non-medical: Not on file   Tobacco Use     Smoking status: Former Smoker     Types: Cigarettes     Last attempt to quit: 2010     Years since quittin.3     Smokeless tobacco: Never Used   Substance and Sexual Activity     Alcohol use: No     Drug use: No     Sexual activity: No   Lifestyle     Physical activity:     Days per week: Not on file     Minutes per session: Not on file     Stress: Not on file   Relationships     Social connections:     Talks on phone: Not on file     Gets together: Not on file     Attends [employer] service: Not on file     Active member of club or organization: Not on file     Attends meetings of clubs or organizations: Not on file     Relationship status: Not on file     Intimate partner violence:     Fear of current or ex partner: Not on file     Emotionally abused:  Not on file     Physically abused: Not on file     Forced sexual activity: Not on file   Other Topics Concern     Not on file   Social History Narrative    Lives at [facility] on [address]  Has Spaniel  dog.   with COPD.     Family History   Problem Relation Age of Onset     Stroke Maternal Grandmother      Allergies   Allergen Reactions     Sulfa (Sulfonamide Antibiotics) Diarrhea and Nausea Only     Also rash, throat swelling     Incruse Ellipta [Umeclidinium] Angioedema     Mouth and tongue swelling     She previously worked as a PT.    Review of Systems - 10 point review of systems negative except what is mentioned in the HPI.        Medications:     Current Outpatient Medications on File Prior to Visit   Medication Sig Dispense Refill     albuterol (PROAIR HFA;PROVENTIL HFA;VENTOLIN HFA) 90 mcg/actuation inhaler Inhale 2 puffs every 6 (six) hours as needed for wheezing. 1 each 6     amLODIPine (NORVASC) 10 MG tablet TAKE 1 TABLET (10 MG TOTAL) BY MOUTH DAILY. 90 tablet 3     budesonide-formoterol (SYMBICORT) 160-4.5 mcg/actuation inhaler Inhale 2 puffs 2 (two) times a day. 1 Inhaler 12     calcium carbonate (OS-NATALEE) 600 mg (1,500 mg) tablet Take 600 mg by mouth daily.        cholecalciferol, vitamin D3, (VITAMIN D3) 2,000 unit Tab Once a day 100 each 3     FLUoxetine (PROZAC) 20 MG capsule TAKE 1 CAPSULE BY MOUTH ONE TIME DAILY 90 capsule 3     ibuprofen (ADVIL,MOTRIN) 200 MG tablet Take 200 mg by mouth every 6 (six) hours as needed for pain (takes 2-3 tablets twice a day for joint aches).       lisinopril (PRINIVIL,ZESTRIL) 40 MG tablet Take 1 tablet (40 mg total) by mouth daily. 90 tablet 3     LORazepam (ATIVAN) 0.5 MG tablet TAKE 1 TABLET BY MOUTH TWICE A DAY AS NEEDED FOR ANXIETY. 30 tablet 3     SODIUM CL/POTASSIUM CHLORIDE (THERMOTABS ORAL) Take 1 tablet by mouth daily.              vitamin A-vitamin C-vit E-min (OCUVITE) Tab tablet Take by mouth daily.       vitamin C-vitamin E  (CRANBERRY CONCENTRATE) cap Take 2 tablets by mouth once daily.       [DISCONTINUED] predniSONE (DELTASONE) 5 MG tablet Take 1 tablet (5 mg total) by mouth daily. 60 tablet 1     No current facility-administered medications on file prior to visit.            Exam/Data:   Vitals  Vitals:    03/27/19 1018   BP: 132/68   Pulse: 79   Resp: 12   SpO2: 93%       EXAM:  GEN: Alert and oriented x3, NAD  HEENT: Nares patent, posterior oropharynx clear.  No thrush.  RESPIRATORY: CTAB but with greatly diminished breath sounds, no wheeze.  No rales.  CARDIOVASCULAR: RRR, no m/r/g  GASTROINTESTINAL: Round, soft, NT/ND  HEM/ONC: no adenopathy, no ecchymosis  MSK: no clubbing.  Ambulates normally  NEURO: cranial nerves appear grossly intact, muscle strength equal  SKIN: no rash or ulcerations    DATA    PFT DATA:  SPIROMETRY  FVC     1.54L   (57%)  FEV1   0.67L   (32%)  Ratio   44    Post BD there is a significant bronchodilator response    LUNG VOLUMES  TLC    11.82L  (235%)  RV  9.95L   (450%)     DIFFUSION  DLCOc 4.43 mL/min/mmHg   (22%)    OVERALL IMPRESSION  There is very severe obstructive ventilatory defect.  There is a significant bronchodilator effect.  There is significant air-trapping and hyperinflation.  There is severe reduction in diffusing capacity when corrected for hemoglobin.    IMAGING:   There is significant emphysematous changes on CT.  Personally reviewed.     [facility]  LOW DOSE LUNG CANCER SCREENING CT CHEST  11/9/2017 10:03 AM     INDICATION: Lung cancer screening. 30 pack year smoking history and nicotine dependence. Increased shortness of breath.  TECHNIQUE: Low-dose lung cancer screening noncontrast CT chest. Dose reduction techniques were used.   COMPARISON: Limited CT chest 1/13/2015     FINDINGS:  LUNGS AND PLEURA: Moderate to severe centrilobular emphysema. Right middle lobe subtle interval bronchiectasis with fibroatelectasis. A few additional scattered linear parenchymal bands of  subsegmental atelectasis/fibrosis. Stable benign 2 mm   pleural-based nodular opacity left lower lobe image 216.     MEDIASTINUM: No adenopathy. No pleural or pericardial effusion.     CORONARY ARTERY CALCIFICATION: Mild.     LIMITED UPPER ABDOMEN: Probable benign hepatic cysts.     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     IMPRESSION:   CONCLUSION:  1.  Stable benign 2 mm left lower lobe nodule with no additional suspicious nodules.  2.  Moderate to severe centrilobular emphysema.  3.  Mild right middle lobe bronchiectasis with associated mild fibroatelectasis redemonstrated.     RADIOLOGIST RECOMMENDATION: Recommend annual low-dose lung cancer screening CT if clinically appropriate.     LungRADS CATEGORY: 1: Negative.     SIGNIFICANT INCIDENTAL FINDINGS: Moderate to severe centrilobular emphysema.    Assessment/Plan:   Celeste Peguero is a 78 y.o. female with PMH significant HTN, tobacco use, and emphysema with COPD, recent increased sputum production.    1. COPD, GOLD class B, stage IV:  Her COPD is very stable, and she has not had any previous exacerbations.  She does have exertional dyspnea.  She had a significant allergic reaction reaction to Incruse.  I would be hesitant to try her on spiriva as it may be a class effect.  I would also be hesitant to use Breo as it may be a carrier molecule.  I put her on Symbicort 160 mcg, 2 puffs BID and she feels quite well, but it is quite expensive for her at >$100.  She has already quit smoking.  She is up to date on her vaccines.  She would like to go to pulmonary rehab, but would not have a steady ride for transportation.  I gave her a prescription for the symbicort as she will try to obtain this medication from [facility].  We will also continue with the prednisone 5 mg daily.    2. Pulmonary nodule:  This is very small.  Given her history, we will follow this up in 1 year.  I don't think she would tolerate any surgical procedures.  I  will follow this for 1 year, and if stable, no further CT scans for lung cancer screening.    Recommend:  - symbicort 2 puffs two times a day, will try to obtain this from [facility]  - prednisone 5 mg daily  - RTC in 3 months    15 minutes spent in consultation and >50% time spent face-to-face.      Nikos Finnegan, DO

## 2021-05-27 NOTE — PROGRESS NOTES
Pulmonary Follow Up Note  3/27/2019    Referring Physician: Dr. Gaines  Reason for Follow Up: COPD      Problem List:     Patient Active Problem List   Diagnosis     Macular degeneration     Osteoporosis     HTN (hypertension)     Hypercholesterolemia     Medication monitoring encounter     Vitamin D deficiency     Essential hypertension     Pulmonary emphysema, unspecified emphysema type (H)     Nicotine dependence in remission     Anxiety           History:     Mrs. Celeste Peguero is a 77 yo female wo presents to pulmonary clinic for evaluation of emphysema and likely COPD.  She originally underwent a lung cancer screening CT and was noted to have significant emphysema.  She was then referred here.  On review she does note that she has STORY, but this is mostly with extreme exertion.  She doesn't really note any significant wheezing or chest tightness.  She does note that high humidity and extreme cold temperatures make her breathing worse.  She does have a >30 pk/yr smoking history, but quit in 2010.  She has not had any history of exacerbations or any history of use of prednisone or antibiotics.  She does get a yearly influenza vaccination.    She was started on Incruse by Dr. Gaines as spririva was not on her formulary and she developed a severe anaphylactoid reaction with lip and tongue swelling.  She has subsequently been maintained on symbicort two times a day.  And albuterol as needed.    Since being seen last, she has been doing well.  Her breathing is very stable.  She was prescribed symbicort two times a day which she has tolerated well.  She did not end up going through the [employer] system to get this as the cost was about $37.  No issues with Symbicort.  She denies any chest tightness, increased cough, voice hoarseness, thrush symptoms.  She has had no significant side effects from the low dose prednisone.  She has stopped this though as she did not notice a large difference and does not want the  side effects.  She did request to have an antibiotic and prednisone on hand to use as a COPD action plan as she did this with her  and felt it works well.  We also discussed the utility of a follow up CT scan.  We ultimately decided to defer this as it looks like it has been stable since .    Past Medical History:   Diagnosis Date     Acute H. pylori gastric ulcer     Treated with no further Sx     Anxiety and depression     Stabilized on prozac 20mg qday.  Uses prn lorazepam.     Hiatal hernia      HTN (hypertension)      Hyponatremia     severe/ hospitalized when Tx for H. pylori/ ulcer.  No longer on HCTZ.     Macular degeneration     Wet with submacular hem R eye.  Avastin shots given x4  - .     Osteoporosis      DEXA S-1.9/F-3.5.  alendronate for two years, then teeth fell out.  Prolia x2 infections,then Reclast started .  No fracture Hx.       Shingles 2009    Thorasic, no post herpetic neuralgia.     Past Surgical History:   Procedure Laterality Date     TONSILECTOMY, ADENOIDECTOMY, BILATERAL MYRINGOTOMY AND TUBES      No other surgeries.       Social History     Socioeconomic History     Marital status:      Spouse name: Not on file     Number of children: Not on file     Years of education: Not on file     Highest education level: Not on file   Occupational History     Not on file   Social Needs     Financial resource strain: Not on file     Food insecurity:     Worry: Not on file     Inability: Not on file     Transportation needs:     Medical: Not on file     Non-medical: Not on file   Tobacco Use     Smoking status: Former Smoker     Types: Cigarettes     Last attempt to quit: 2010     Years since quittin.3     Smokeless tobacco: Never Used   Substance and Sexual Activity     Alcohol use: No     Drug use: No     Sexual activity: No   Lifestyle     Physical activity:     Days per week: Not on file     Minutes per session: Not on file     Stress: Not on file    Relationships     Social connections:     Talks on phone: Not on file     Gets together: Not on file     Attends Muslim service: Not on file     Active member of club or organization: Not on file     Attends meetings of clubs or organizations: Not on file     Relationship status: Not on file     Intimate partner violence:     Fear of current or ex partner: Not on file     Emotionally abused: Not on file     Physically abused: Not on file     Forced sexual activity: Not on file   Other Topics Concern     Not on file   Social History Narrative    Lives at [facility] on [facility] [address]. [place] [address].  Has Spaniel  dog.   with COPD.     Family History   Problem Relation Age of Onset     Stroke Maternal Grandmother      Allergies   Allergen Reactions     Sulfa (Sulfonamide Antibiotics) Diarrhea and Nausea Only     Also rash, throat swelling     Incruse Ellipta [Umeclidinium] Angioedema     Mouth and tongue swelling     She previously worked as a PT.    Review of Systems - 10 point review of systems negative except what is mentioned in the HPI.        Medications:     Current Outpatient Medications on File Prior to Visit   Medication Sig Dispense Refill     albuterol (PROAIR HFA;PROVENTIL HFA;VENTOLIN HFA) 90 mcg/actuation inhaler Inhale 2 puffs every 6 (six) hours as needed for wheezing. 1 each 6     amLODIPine (NORVASC) 10 MG tablet TAKE 1 TABLET (10 MG TOTAL) BY MOUTH DAILY. 90 tablet 3     budesonide-formoterol (SYMBICORT) 160-4.5 mcg/actuation inhaler Inhale 2 puffs 2 (two) times a day. 1 Inhaler 12     calcium carbonate (OS-NATALEE) 600 mg (1,500 mg) tablet Take 600 mg by mouth daily.        cholecalciferol, vitamin D3, (VITAMIN D3) 2,000 unit Tab Once a day 100 each 3     FLUoxetine (PROZAC) 20 MG capsule TAKE 1 CAPSULE BY MOUTH ONE TIME DAILY 90 capsule 3     ibuprofen (ADVIL,MOTRIN) 200 MG tablet Take 200 mg by mouth every 6 (six) hours as needed for pain (takes 2-3 tablets twice a day for joint aches).        lisinopril (PRINIVIL,ZESTRIL) 40 MG tablet Take 1 tablet (40 mg total) by mouth daily. 90 tablet 3     LORazepam (ATIVAN) 0.5 MG tablet TAKE 1 TABLET BY MOUTH TWICE A DAY AS NEEDED FOR ANXIETY. 30 tablet 3     SODIUM CL/POTASSIUM CHLORIDE (THERMOTABS ORAL) Take 1 tablet by mouth daily.              vitamin A-vitamin C-vit E-min (OCUVITE) Tab tablet Take by mouth daily.       vitamin C-vitamin E (CRANBERRY CONCENTRATE) cap Take 2 tablets by mouth once daily.       [DISCONTINUED] predniSONE (DELTASONE) 5 MG tablet Take 1 tablet (5 mg total) by mouth daily. 60 tablet 1     No current facility-administered medications on file prior to visit.            Exam/Data:   Vitals  Vitals:    03/27/19 1018   BP: 132/68   Pulse: 79   Resp: 12   SpO2: 93%       EXAM:  GEN: Alert and oriented x3, NAD  HEENT: Nares patent, posterior oropharynx clear.  No thrush.  RESPIRATORY: CTAB but with greatly diminished breath sounds, no wheeze.  No rales.  CARDIOVASCULAR: RRR, no m/r/g  GASTROINTESTINAL: Round, soft, NT/ND  HEM/ONC: no adenopathy, no ecchymosis  MSK: no clubbing.  Ambulates normally  NEURO: cranial nerves appear grossly intact, muscle strength equal  SKIN: no rash or ulcerations    DATA    PFT DATA:  SPIROMETRY  FVC     1.54L   (57%)  FEV1   0.67L   (32%)  Ratio   44    Post BD there is a significant bronchodilator response    LUNG VOLUMES  TLC    11.82L  (235%)  RV  9.95L   (450%)     DIFFUSION  DLCOc 4.43 mL/min/mmHg   (22%)    OVERALL IMPRESSION  There is very severe obstructive ventilatory defect.  There is a significant bronchodilator effect.  There is significant air-trapping and hyperinflation.  There is severe reduction in diffusing capacity when corrected for hemoglobin.    IMAGING:   There is significant emphysematous changes on CT.  Personally reviewed.     [facility]  LOW DOSE LUNG CANCER SCREENING CT CHEST  11/9/2017 10:03 AM     INDICATION: Lung cancer screening. 30 pack year smoking history and nicotine  dependence. Increased shortness of breath.  TECHNIQUE: Low-dose lung cancer screening noncontrast CT chest. Dose reduction techniques were used.   COMPARISON: Limited CT chest 1/13/2015     FINDINGS:  LUNGS AND PLEURA: Moderate to severe centrilobular emphysema. Right middle lobe subtle interval bronchiectasis with fibroatelectasis. A few additional scattered linear parenchymal bands of subsegmental atelectasis/fibrosis. Stable benign 2 mm   pleural-based nodular opacity left lower lobe image 216.     MEDIASTINUM: No adenopathy. No pleural or pericardial effusion.     CORONARY ARTERY CALCIFICATION: Mild.     LIMITED UPPER ABDOMEN: Probable benign hepatic cysts.     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     IMPRESSION:   CONCLUSION:  1.  Stable benign 2 mm left lower lobe nodule with no additional suspicious nodules.  2.  Moderate to severe centrilobular emphysema.  3.  Mild right middle lobe bronchiectasis with associated mild fibroatelectasis redemonstrated.     RADIOLOGIST RECOMMENDATION: Recommend annual low-dose lung cancer screening CT if clinically appropriate.     LungRADS CATEGORY: 1: Negative.     SIGNIFICANT INCIDENTAL FINDINGS: Moderate to severe centrilobular emphysema.    Assessment/Plan:   Celeste Peguero is a 78 y.o. female with PMH significant HTN, tobacco use, and emphysema with COPD, recent increased sputum production.    1. COPD, GOLD class B, stage IV:  Her COPD is very stable, and she has not had any previous exacerbations.  She does have exertional dyspnea, but this has been pretty good as of late.  She had a significant allergic reaction reaction to Incruse.  I would be hesitant to try her on spiriva as it may be a class effect.  I would also be hesitant to use Breo as it may be a carrier molecule.  I put her on Symbicort 160 mcg, 2 puffs BID and she feels quite well.  She has already quit smoking.  She is up to date on her vaccines.  She would like to go  to pulmonary rehab, but would not have a steady ride for transportation.  I gave her a prescription for the symbicort as she will try to obtain this medication from [facility].  She has stopped the low dose prednisone.  We will give her Azithromycin and Prednisone to have on hand.    2. Pulmonary nodule:  This is very small at 2 mm.  It is commented in the previous report that it is stable on CT from 2015.  We will hold off on any further CT at this time.  If we do decide to follow up on this in the future then I would do it a nodule follow up, rather than lung cancer screening.    Recommend:  - symbicort 2 puffs two times a day  - albuterol as needed  - prednisone 40 mg x5 days, Z-michelle if exacerbating COPD  - RTC in 3 months    15 minutes spent in consultation and >50% time spent face-to-face.      Nikos Finnegan, DO

## 2021-05-28 NOTE — TELEPHONE ENCOUNTER
Refill Approved    Rx renewed per Medication Renewal Policy. Medication was last renewed on 2.26.19.    Danna Chan, [facility] Triage/Med Refill 4/30/2019     Requested Prescriptions   Pending Prescriptions Disp Refills     FLUoxetine (PROZAC) 20 MG capsule [Pharmacy Med Name: FLUOXETINE 20MG CAPSULE 20 CAP] 90 capsule 3     Sig: TAKE 1 CAPSULE BY MOUTH ONE TIME DAILY       [facility] Refill Protocol  Passed - 4/29/2019  8:27 AM        Passed - PCP or prescribing provider visit in last year     Last office visit with prescriber/PCP: 2/26/2019 Usman Gaines MD OR same dept: 2/26/2019 Usman Gaines MD OR same specialty: 2/26/2019 Usman Gaines MD  Last physical: Visit date not found Last MTM visit: Visit date not found   Next visit within 3 mo: Visit date not found  Next physical within 3 mo: Visit date not found  Prescriber OR PCP: Usman Gaines MD  Last diagnosis associated with med order: 1. Fatigue  - FLUoxetine (PROZAC) 20 MG capsule [Pharmacy Med Name: FLUOXETINE 20MG CAPSULE 20 CAP]; TAKE 1 CAPSULE BY MOUTH ONE TIME DAILY  Dispense: 90 capsule; Refill: 3    If protocol passes may refill for 12 months if within 3 months of last provider visit (or a total of 15 months).

## 2021-05-29 NOTE — TELEPHONE ENCOUNTER
Patient's son reports nausea and diarrhea.  Sx started yest.  No known fever or abd pain.  zofran prn.  Suspected viral gatroenteritis.  Reported outbreak of similar diarrhea illness at assisted living.

## 2021-05-30 VITALS — WEIGHT: 123 LBS | BODY MASS INDEX: 20.49 KG/M2 | HEIGHT: 65 IN

## 2021-05-30 NOTE — PATIENT INSTRUCTIONS - HE
Continue with current medications.    Continue regular walking.    Routine follow-up with me in 3 months.    Follow-up with ophthalmology as planned.    Follow-up with pulmonary medicine August 15.    Let me know if you wish to reconsider undergoing a bone density scan, or a chest CT scan.

## 2021-05-30 NOTE — PROGRESS NOTES
[facility] clinic Follow Up Note    Celeste Peguero   79 y.o. female    Date of Visit: 7/2/2019    Chief Complaint   Patient presents with     Follow-up     4 month checkup     Subjective  Suzanna is here for follow-up of multiple medical problems.    She has severe emphysema, quit smoking in 2010.  No current exacerbation of her emphysema.  No increasing cough or shortness of breath.  Moderate dyspnea on exertion.    Still on Symbicort twice daily.  She had allergic reaction to Spiriva in the past.    She stopped the prednisone about a month ago.  She does not want to use long-term steroids at this time.    I did review the pulmonary note from March 2019.    December 2018 FEV1 was 0.67, 32%.    Previous CT scan did show some right middle lobe bronchiectasis.  That was November 2017 scan.  2 mm left lower lobe nodule was noted.    For screening lung CT scan was declined by insurance.  Pulmonology had recommended consideration of a regular CT scan to follow-up on the nodule.  Patient at this time declines follow-up CT scan.    She states she would not be a surgical candidate and would not want an intervention if she did develop lung cancer.    No chest pain or chest pressure.  No palpitations.    Hypertension remains well controlled.  She has some very mild orthostasis with standing rapidly, but that stable and infrequent.    She is eating and drinking normally now.    She still takes a sodium chloride pill with history of hyponatremia.  She did develop some mild lower extremity edema when she is on her feet more during the day and has reduce the sodium chloride pill to half tablet most days.    I reviewed lab work from February 2019 with a sodium level of 135 and normal potassium.    She still on amlodipine 10 mg a day and lisinopril 40 mg a day.    She does have chronic anxiety.  Still on Prozac.  Just occasional lorazepam.  Also some stress-induced nausea.  She had a stomach flu episode in May but that  resolved within a few days.  She had been given Zofran at that time and she does feel the Zofran significant Checo helps her anxiety associated nausea and requested refill.    No falls.  I again discussed fall risk with her lorazepam and sedating medication.  She accepts that risk and feels the benefit outweighs the risk for her.    UTI was treated with Cipro in February, she denies UTI symptoms currently.    Medically degeneration, she had to reschedule her ophthalmology visit because of the recent events with her 97-year-old mother, Marti, moving into her building recently.  No changes in vision.    Enjoys spending time with her cats, Hardy and ari.    PMHx:    Past Medical History:   Diagnosis Date     Acute H. pylori gastric ulcer 2010    Treated with no further Sx     Anxiety and depression     Stabilized on prozac 20mg qday.  Uses prn lorazepam.     Hiatal hernia      HTN (hypertension)      Hyponatremia     severe/ hospitalized when Tx for H. pylori/ ulcer.  No longer on HCTZ.     Macular degeneration     Wet with submacular hem R eye.  Avastin shots given x4 8/11 - 1/12.     Osteoporosis     4/13 DEXA S-1.9/F-3.5.  alendronate for two years, then teeth fell out.  Prolia x2 infections,then Reclast started 12/13.  No fracture Hx.       Shingles 2009    Thorasic, no post herpetic neuralgia.     PSHx:    Past Surgical History:   Procedure Laterality Date     TONSILECTOMY, ADENOIDECTOMY, BILATERAL MYRINGOTOMY AND TUBES      No other surgeries.       Immunizations:   Immunization History   Administered Date(s) Administered     Influenza high dose, seasonal 09/14/2015, 11/08/2016, 10/16/2017, 09/25/2018     Influenza, inj, historic,unspecified 09/01/2014     Pneumo Conj 13-V (2010&after) 12/05/2014     Pneumo Polysac 23-V 03/10/2017       ROS A comprehensive review of systems was performed and was otherwise negative    Medications, allergies, and problem list were reviewed and updated    Exam  /64 (Patient  Site: Right Arm, Patient Position: Sitting, Cuff Size: Adult Small)   Pulse 84   Wt 114 lb 12.8 oz (52.1 kg)   BMI 19.40 kg/m    Alert and oriented.  Appears calm, in good spirits.  Lungs with decreased breath sounds throughout unchanged, no new wheezing or rhonchi.  Heart is regular without murmur.  Abdomen is nontender, thin.  No ankle edema.  Able to clamp on the exam table.    Assessment/Plan  1. Essential hypertension  Controlled.  Continue current medications.    2. Hyponatremia  Chronic anxiety and polydipsia associate hyponatremia in the past.  Has been recently better with anxiety well-controlled.  Continue on low-dose sodium chloride pill.  - Basic Metabolic Panel    3. Pulmonary emphysema, unspecified emphysema type (H)  Severe but stable.  Continue Symbicort twice daily.    Off prednisone 1 month ago.  Follow-up with pulmonary medicine on August 15.    History of lung nodule, 2 mm.  She does not wish to have further chest imaging.  She would not want intervention for new diagnosis of lung cancer at this time.    History of bronchiectasis but no bronchitis exacerbation at this time.  Has tolerated Z-Pedro and Cipro in the past.    4. Long term systemic steroid user  But no longer on chronic steroids.    5. Macular degeneration, unspecified laterality, unspecified type  She has a follow-up appoint with her ophthalmologist, stressed the importance of keeping her follow-up appointments with ophthalmology.    6. Vitamin D deficiency  She is now 2000 IU a day vitamin D.  Previous level was low on 1000 IU a day.  Recheck level.  Increase if needed.  - Vitamin D, Total (25-Hydroxy)    7. Osteoporosis, unspecified osteoporosis type, unspecified pathological fracture presence  I recommended a DEXA scan, but she declines at this time.  I discussed option for Prolia, but she does not wish to consider that.  She had lost all her teeth with IV zoledronic acid in the past.    Continue regular walking and calcium and  vitamin D supplement.  Did discuss risk of compression fracture or fracture risk with her osteoporosis, that may benefit from Prolia, but she does not wish to consider.    8. Postmenopausal status  As above    9. Anxiety  Controlled.  Prozac.  Fall risk with lorazepam discussed, patient accepts risk.  - LORazepam (ATIVAN) 0.5 MG tablet; TAKE 1 TABLET BY MOUTH TWICE A DAY AS NEEDED FOR ANXIETY.  Dispense: 30 tablet; Refill: 3    10. Nausea  Stress-induced nausea.  She feels benefit from the Zofran and wishes a refill.  - ondansetron (ZOFRAN ODT) 4 MG disintegrating tablet; Take 1 tablet (4 mg total) by mouth every 8 (eight) hours as needed for nausea.  Dispense: 10 tablet; Refill: 1    Return in about 3 months (around 10/2/2019) for Recheck.   Patient Instructions   Continue with current medications.    Continue regular walking.    Routine follow-up with me in 3 months.    Follow-up with ophthalmology as planned.    Follow-up with pulmonary medicine August 15.    Let me know if you wish to reconsider undergoing a bone density scan, or a chest CT scan.    Usman Gaines MD        Current Outpatient Medications   Medication Sig Dispense Refill     albuterol (PROAIR HFA;PROVENTIL HFA;VENTOLIN HFA) 90 mcg/actuation inhaler Inhale 2 puffs every 6 (six) hours as needed for wheezing. 1 each 6     amLODIPine (NORVASC) 10 MG tablet TAKE 1 TABLET (10 MG TOTAL) BY MOUTH DAILY. 90 tablet 3     budesonide-formoterol (SYMBICORT) 160-4.5 mcg/actuation inhaler Inhale 2 puffs 2 (two) times a day. 1 Inhaler 12     calcium carbonate (OS-NATALEE) 600 mg (1,500 mg) tablet Take 600 mg by mouth daily.        cholecalciferol, vitamin D3, (VITAMIN D3) 2,000 unit Tab Once a day 100 each 3     FLUoxetine (PROZAC) 20 MG capsule TAKE 1 CAPSULE BY MOUTH ONE TIME DAILY 90 capsule 0     ibuprofen (ADVIL,MOTRIN) 200 MG tablet Take 200 mg by mouth every 6 (six) hours as needed for pain (takes 2-3 tablets twice a day for joint aches).       lisinopril  (PRINIVIL,ZESTRIL) 40 MG tablet Take 1 tablet (40 mg total) by mouth daily. 90 tablet 3     LORazepam (ATIVAN) 0.5 MG tablet TAKE 1 TABLET BY MOUTH TWICE A DAY AS NEEDED FOR ANXIETY. 30 tablet 3     ondansetron (ZOFRAN ODT) 4 MG disintegrating tablet Take 1 tablet (4 mg total) by mouth every 8 (eight) hours as needed for nausea. 10 tablet 1     SODIUM CL/POTASSIUM CHLORIDE (THERMOTABS ORAL) Take 1 tablet by mouth daily.              vitamin A-vitamin C-vit E-min (OCUVITE) Tab tablet Take by mouth daily.       vitamin C-vitamin E (CRANBERRY CONCENTRATE) cap Take 2 tablets by mouth once daily.       No current facility-administered medications for this visit.      Allergies   Allergen Reactions     Sulfa (Sulfonamide Antibiotics) Diarrhea and Nausea Only     Also rash, throat swelling     Incruse Ellipta [Umeclidinium] Angioedema     Mouth and tongue swelling     Social History     Tobacco Use     Smoking status: Former Smoker     Types: Cigarettes     Last attempt to quit: 2010     Years since quittin.5     Smokeless tobacco: Never Used   Substance Use Topics     Alcohol use: No     Drug use: No

## 2021-05-31 VITALS — BODY MASS INDEX: 19.49 KG/M2 | HEIGHT: 65 IN | WEIGHT: 117 LBS

## 2021-05-31 VITALS — HEIGHT: 65 IN | WEIGHT: 116 LBS | BODY MASS INDEX: 19.33 KG/M2

## 2021-05-31 VITALS — HEIGHT: 65 IN | BODY MASS INDEX: 19.66 KG/M2 | WEIGHT: 118 LBS

## 2021-05-31 VITALS — BODY MASS INDEX: 19.79 KG/M2 | WEIGHT: 118.8 LBS | HEIGHT: 65 IN

## 2021-05-31 NOTE — TELEPHONE ENCOUNTER
Refill Approved    Rx renewed per Medication Renewal Policy. Medication was last renewed on 4/30/19.9/25/18    Valeria Chen, [facility] Triage/Med Refill 8/27/2019     Requested Prescriptions   Pending Prescriptions Disp Refills     FLUoxetine (PROZAC) 20 MG capsule [Pharmacy Med Name: FLUOXETINE 20MG CAPSULE 20 CAP] 90 capsule 0     Sig: TAKE 1 CAPSULE BY MOUTH ONE TIME DAILY       SSRI Refill Protocol  Passed - 8/26/2019  6:38 PM        Passed - PCP or prescribing provider visit in last year     Last office visit with prescriber/PCP: 7/2/2019 Usman Gaines MD OR same dept: 7/2/2019 Usman Gaines MD OR same specialty: 7/2/2019 Usman Gaines MD  Last physical: Visit date not found Last MTM visit: Visit date not found   Next visit within 3 mo: Visit date not found  Next physical within 3 mo: Visit date not found  Prescriber OR PCP: Usman Gaines MD  Last diagnosis associated with med order: 1. Fatigue  - FLUoxetine (PROZAC) 20 MG capsule [Pharmacy Med Name: FLUOXETINE 20MG CAPSULE 20 CAP]; TAKE 1 CAPSULE BY MOUTH ONE TIME DAILY  Dispense: 90 capsule; Refill: 0    2. HTN (hypertension)  - lisinopril (PRINIVIL,ZESTRIL) 40 MG tablet [Pharmacy Med Name: LISINOPRIL 40MG TABLET 40 MG TAB]; Take 1 tablet (40 mg total) by mouth daily.  Dispense: 90 tablet; Refill: 3    If protocol passes may refill for 12 months if within 3 months of last provider visit (or a total of 15 months).             lisinopril (PRINIVIL,ZESTRIL) 40 MG tablet [Pharmacy Med Name: LISINOPRIL 40MG TABLET 40 MG TAB] 90 tablet 3     Sig: TAKE 1 TABLET (40 MG TOTAL) BY MOUTH DAILY.       Ace Inhibitors Refill Protocol Passed - 8/26/2019  6:38 PM        Passed - PCP or prescribing provider visit in past 12 months       Last office visit with prescriber/PCP: 7/2/2019 Usman Gaines MD OR same dept: 7/2/2019 Usman Gaines MD OR same specialty: 7/2/2019 Usman Gaines MD  Last physical: Visit date not found Last MTM visit: Visit date not found    Next visit within 3 mo: Visit date not found  Next physical within 3 mo: Visit date not found  Prescriber OR PCP: Usman Gaines MD  Last diagnosis associated with med order: 1. Fatigue  - FLUoxetine (PROZAC) 20 MG capsule [Pharmacy Med Name: FLUOXETINE 20MG CAPSULE 20 CAP]; TAKE 1 CAPSULE BY MOUTH ONE TIME DAILY  Dispense: 90 capsule; Refill: 0    2. HTN (hypertension)  - lisinopril (PRINIVIL,ZESTRIL) 40 MG tablet [Pharmacy Med Name: LISINOPRIL 40MG TABLET 40 MG TAB]; Take 1 tablet (40 mg total) by mouth daily.  Dispense: 90 tablet; Refill: 3    If protocol passes may refill for 12 months if within 3 months of last provider visit (or a total of 15 months).             Passed - Serum Potassium in past 12 months     Lab Results   Component Value Date    Potassium 4.2 07/02/2019             Passed - Blood pressure filed in past 12 months     BP Readings from Last 1 Encounters:   08/15/19 108/52             Passed - Serum Creatinine in past 12 months     Creatinine   Date Value Ref Range Status   07/02/2019 0.82 0.60 - 1.10 mg/dL Final

## 2021-05-31 NOTE — PROGRESS NOTES
Pulmonary Follow Up Note  8/15/2019    Referring Physician: Dr. Gaines  Reason for Follow Up: COPD      Problem List:     Patient Active Problem List   Diagnosis     Macular degeneration     Osteoporosis     HTN (hypertension)     Hypercholesterolemia     Medication monitoring encounter     Vitamin D deficiency     Essential hypertension     Pulmonary emphysema, unspecified emphysema type (H)     Nicotine dependence in remission     Anxiety       History:     Mrs. Celeste Peguero is a 79 yo female wo presents to pulmonary clinic for evaluation of emphysema and likely COPD.  She originally underwent a lung cancer screening CT and was noted to have significant emphysema.  She was then referred here.  On review she does note that she has STORY, but this is mostly with extreme exertion.  She doesn't really note any significant wheezing or chest tightness.  She does note that high humidity and extreme cold temperatures make her breathing worse.  She does have a >30 pk/yr smoking history, but quit in 2010.  She has not had any history of exacerbations or any history of use of prednisone or antibiotics.  She does get a yearly influenza vaccination.    She was started on Incruse by Dr. Gaines as spririva was not on her formulary and she developed a severe anaphylactoid reaction with lip and tongue swelling.  She has subsequently been maintained on symbicort two times a day.  And albuterol as needed.    Since being seen last, she has been doing well.    Her breathing is very stable.    She was prescribed symbicort two times a day which she has been tolerating well.    She denies any chest tightness, increased cough, voice hoarseness, thrush symptoms.    She has not had to use her COPD action plan.  She notes that her 96 yo mother moved into the same independent living center and so she takes care of her now.  She thinks the increased activity has significantly helped her breathing as well.    Past Medical History:    Diagnosis Date     Acute H. pylori gastric ulcer     Treated with no further Sx     Anxiety and depression     Stabilized on prozac 20mg qday.  Uses prn lorazepam.     Hiatal hernia      HTN (hypertension)      Hyponatremia     severe/ hospitalized when Tx for H. pylori/ ulcer.  No longer on HCTZ.     Macular degeneration     Wet with submacular hem R eye.  Avastin shots given x4  - .     Osteoporosis      DEXA S-1.9/F-3.5.  alendronate for two years, then teeth fell out.  Prolia x2 infections,then Reclast started .  No fracture Hx.       Shingles 2009    Thorasic, no post herpetic neuralgia.     Past Surgical History:   Procedure Laterality Date     TONSILECTOMY, ADENOIDECTOMY, BILATERAL MYRINGOTOMY AND TUBES      No other surgeries.       Social History     Socioeconomic History     Marital status:      Spouse name: Not on file     Number of children: Not on file     Years of education: Not on file     Highest education level: Not on file   Occupational History     Not on file   Social Needs     Financial resource strain: Not on file     Food insecurity:     Worry: Not on file     Inability: Not on file     Transportation needs:     Medical: Not on file     Non-medical: Not on file   Tobacco Use     Smoking status: Former Smoker     Types: Cigarettes     Last attempt to quit: 2010     Years since quittin.6     Smokeless tobacco: Never Used   Substance and Sexual Activity     Alcohol use: No     Drug use: No     Sexual activity: Never   Lifestyle     Physical activity:     Days per week: Not on file     Minutes per session: Not on file     Stress: Not on file   Relationships     Social connections:     Talks on phone: Not on file     Gets together: Not on file     Attends [employer] service: Not on file     Active member of club or organization: Not on file     Attends meetings of clubs or organizations: Not on file     Relationship status: Not on file     Intimate partner  violence:     Fear of current or ex partner: Not on file     Emotionally abused: Not on file     Physically abused: Not on file     Forced sexual activity: Not on file   Other Topics Concern     Not on file   Social History Narrative    Lives at [facility] on [address].  Has Spaniel  dog.   with COPD.     Family History   Problem Relation Age of Onset     Stroke Maternal Grandmother      Allergies   Allergen Reactions     Sulfa (Sulfonamide Antibiotics) Diarrhea and Nausea Only     Also rash, throat swelling     Incruse Ellipta [Umeclidinium] Angioedema     Mouth and tongue swelling     She previously worked as a PT.    Review of Systems - 10 point review of systems negative except what is mentioned in the HPI.        Medications:     Current Outpatient Medications on File Prior to Visit   Medication Sig Dispense Refill     albuterol (PROAIR HFA;PROVENTIL HFA;VENTOLIN HFA) 90 mcg/actuation inhaler Inhale 2 puffs every 6 (six) hours as needed for wheezing. 1 each 6     amLODIPine (NORVASC) 10 MG tablet TAKE 1 TABLET (10 MG TOTAL) BY MOUTH DAILY. 90 tablet 3     budesonide-formoterol (SYMBICORT) 160-4.5 mcg/actuation inhaler Inhale 2 puffs 2 (two) times a day. 1 Inhaler 12     calcium carbonate (OS-NATALEE) 600 mg (1,500 mg) tablet Take 600 mg by mouth daily.        cholecalciferol, vitamin D3, (VITAMIN D3) 2,000 unit Tab Once a day 100 each 3     FLUoxetine (PROZAC) 20 MG capsule TAKE 1 CAPSULE BY MOUTH ONE TIME DAILY 90 capsule 0     ibuprofen (ADVIL,MOTRIN) 200 MG tablet Take 200 mg by mouth every 6 (six) hours as needed for pain (takes 2-3 tablets twice a day for joint aches).       lisinopril (PRINIVIL,ZESTRIL) 40 MG tablet Take 1 tablet (40 mg total) by mouth daily. 90 tablet 3     LORazepam (ATIVAN) 0.5 MG tablet TAKE 1 TABLET BY MOUTH TWICE A DAY AS NEEDED FOR ANXIETY. 30 tablet 3     ondansetron (ZOFRAN ODT) 4 MG disintegrating tablet Take 1 tablet (4 mg total) by mouth every 8 (eight) hours  as needed for nausea. 10 tablet 1     SODIUM CL/POTASSIUM CHLORIDE (THERMOTABS ORAL) Take 1 tablet by mouth daily.              vitamin A-vitamin C-vit E-min (OCUVITE) Tab tablet Take by mouth daily.       vitamin C-vitamin E (CRANBERRY CONCENTRATE) cap Take 2 tablets by mouth once daily.       No current facility-administered medications on file prior to visit.            Exam/Data:   Vitals  Vitals:    08/15/19 1024   BP: 108/52   Pulse: 82   Resp: 17   SpO2: 92%       EXAM:  GEN: Alert and oriented x3, NAD  HEENT: Nares patent, posterior oropharynx clear.  No thrush.  RESPIRATORY: CTAB but with greatly diminished breath sounds, no wheeze.  No rales.  CARDIOVASCULAR: RRR, no m/r/g  GASTROINTESTINAL: Round, soft, NT/ND  HEM/ONC: no adenopathy, no ecchymosis  MSK: no clubbing.  Ambulates normally  NEURO: cranial nerves appear grossly intact, muscle strength equal  SKIN: no rash or ulcerations    DATA    PFT DATA:  SPIROMETRY  FVC     1.54L   (57%)  FEV1   0.67L   (32%)  Ratio   44    Post BD there is a significant bronchodilator response    LUNG VOLUMES  TLC    11.82L  (235%)  RV  9.95L   (450%)     DIFFUSION  DLCOc 4.43 mL/min/mmHg   (22%)    OVERALL IMPRESSION  There is very severe obstructive ventilatory defect.  There is a significant bronchodilator effect.  There is significant air-trapping and hyperinflation.  There is severe reduction in diffusing capacity when corrected for hemoglobin.    IMAGING:   There is significant emphysematous changes on CT.  Personally reviewed.     [facility]  LOW DOSE LUNG CANCER SCREENING CT CHEST  11/9/2017 10:03 AM     INDICATION: Lung cancer screening. 30 pack year smoking history and nicotine dependence. Increased shortness of breath.  TECHNIQUE: Low-dose lung cancer screening noncontrast CT chest. Dose reduction techniques were used.   COMPARISON: Limited CT chest 1/13/2015     FINDINGS:  LUNGS AND PLEURA: Moderate to severe centrilobular emphysema. Right middle lobe  subtle interval bronchiectasis with fibroatelectasis. A few additional scattered linear parenchymal bands of subsegmental atelectasis/fibrosis. Stable benign 2 mm   pleural-based nodular opacity left lower lobe image 216.     MEDIASTINUM: No adenopathy. No pleural or pericardial effusion.     CORONARY ARTERY CALCIFICATION: Mild.     LIMITED UPPER ABDOMEN: Probable benign hepatic cysts.     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     IMPRESSION:   CONCLUSION:  1.  Stable benign 2 mm left lower lobe nodule with no additional suspicious nodules.  2.  Moderate to severe centrilobular emphysema.  3.  Mild right middle lobe bronchiectasis with associated mild fibroatelectasis redemonstrated.     RADIOLOGIST RECOMMENDATION: Recommend annual low-dose lung cancer screening CT if clinically appropriate.     LungRADS CATEGORY: 1: Negative.     SIGNIFICANT INCIDENTAL FINDINGS: Moderate to severe centrilobular emphysema.    Assessment/Plan:   Celeste Peguero is a 79 y.o. female with PMH significant HTN, tobacco use, and emphysema with COPD, recent increased sputum production.    1. COPD, GOLD class B, stage IV:  Her COPD is very stable, and she has not had any previous exacerbations.  She does have exertional dyspnea, but this has been pretty good as of late.  She had a significant allergic reaction reaction to Incruse.  I would be hesitant to try her on spiriva as it may be a class effect.  I would also be hesitant to use Breo as it may be a carrier molecule.  I put her on Symbicort 160 mcg, 2 puffs BID and she feels quite well.  She has already quit smoking.  She is up to date on her vaccines.  She would like to go to pulmonary rehab, but would not have a steady ride for transportation.  She will give her Azithromycin and Prednisone to have on hand.    2. Pulmonary nodule:  This is very small at 2 mm.  It is commented in the previous report that it is stable on CT from 2015.  We will hold off  on any further CT at this time.  If we do decide to follow up on this in the future then I would do it a nodule follow up, rather than lung cancer screening.    Recommend:  - symbicort 2 puffs two times a day  - albuterol as needed  - prednisone 40 mg x5 days, Z-michelle if exacerbating COPD  - RTC in 12 months    15 minutes spent in consultation and >50% time spent face-to-face.      Nikos Finnegan, DO

## 2021-06-01 VITALS — HEIGHT: 65 IN | BODY MASS INDEX: 19.16 KG/M2 | WEIGHT: 115 LBS

## 2021-06-01 VITALS — HEIGHT: 65 IN | BODY MASS INDEX: 19.83 KG/M2 | WEIGHT: 119 LBS

## 2021-06-01 VITALS — BODY MASS INDEX: 20.16 KG/M2 | WEIGHT: 121 LBS | HEIGHT: 65 IN

## 2021-06-01 VITALS — WEIGHT: 117 LBS | BODY MASS INDEX: 19.77 KG/M2

## 2021-06-02 VITALS — WEIGHT: 121 LBS | BODY MASS INDEX: 20.45 KG/M2

## 2021-06-02 VITALS — BODY MASS INDEX: 19.99 KG/M2 | WEIGHT: 120 LBS | HEIGHT: 65 IN

## 2021-06-02 VITALS — WEIGHT: 123 LBS | BODY MASS INDEX: 20.49 KG/M2 | HEIGHT: 65 IN

## 2021-06-02 VITALS — WEIGHT: 123 LBS | BODY MASS INDEX: 20.79 KG/M2

## 2021-06-02 VITALS — BODY MASS INDEX: 19.99 KG/M2 | HEIGHT: 65 IN | WEIGHT: 120 LBS

## 2021-06-02 NOTE — PATIENT INSTRUCTIONS - HE
Make sure you schedule a full, healthy supper every evening.  You need a sitdown meal at least once a day to properly digest your food.    If you have any increasing lightheaded dizzy spells, or blood pressures less than 115/60, contact me and I would have you consider reduction in your lisinopril dose.    Follow-up in December for checkup

## 2021-06-02 NOTE — PROGRESS NOTES
[facility] clinic Follow Up Note    Celeste Peguero   79 y.o. female    Date of Visit: 10/3/2019    Chief Complaint   Patient presents with     Follow-up     3 month checkup     Subjective  Suzanna is here for follow-up on hypertension and her COPD.    She is caring for her 97-year-old mother, Marti, and cooking meals for her.  She is very proud that she has helped her mother improve and get settled here in [place].    She enjoys spending time with her mother, denies that that is worsening her anxiety.    The family had reported there was some increased anxiety with helping her mother more often.    Patient does admit to not eating regular meals since she has been helping her mother more.  She tends to snack during the day and not eat her vegetables or sit down supper.  She denies getting hungry.    No abdominal pain and bowels are regular.    She is lost 1 pound.    She denies any worsening weakness or lightheaded spells.    She states she does check her blood pressure occasionally has been in the 120s 130s over 60s.  No edema.    Still on lisinopril 40 mg a day and amlodipine 10 mg a day.    She is still on the one sodium chloride pill daily.  Her sodium was just borderline at 134 and July.    No new muscle spasms or with general weakness.    Chronic anxiety, still on Prozac 20 mg a day.  She does use the Lorazepam 0.5 mg about 4 to 5 days a week, usually in the evening.  She denies any falls.  No confusional episodes.    She is taking her vitamin D supplement 2000 IU a day now.  Her level was low at 25.8 in July.    Severe macular degeneration.  She is up-to-date with her ophthalmology follow-up.    Her cats, Hardy and ginger are doing well.    I did review the pulmonology note from August 2019.  Patient was stable.  Severe COPD.  December 2018 FEV1 was 0.67 at 32%.  She quit smoking in 2010.  Moderate dyspnea on exertion is stable.  On Symbicort twice daily and as needed albuterol.  She had a  allergic type reaction with facial swelling from Spiriva.    She was given prednisone for 5 days at 40 mg at a [facility] in case of future use by pulmonology, but she has not picked that up.    I did review November 2017 chest CT scan with a right middle lobe bronchiectasis and a 2 mm left lower lobe nodule.  But patient is still not wanted to repeat chest CT scan.  No new cough.  She would not want intervention for lung cancer if that was found.    PMHx:    Past Medical History:   Diagnosis Date     Acute H. pylori gastric ulcer 2010    Treated with no further Sx     Anxiety and depression     Stabilized on prozac 20mg qday.  Uses prn lorazepam.     Hiatal hernia      HTN (hypertension)      Hyponatremia     severe/ hospitalized when Tx for H. pylori/ ulcer.  No longer on HCTZ.     Macular degeneration     Wet with submacular hem R eye.  Avastin shots given x4 8/11 - 1/12.     Osteoporosis     4/13 DEXA S-1.9/F-3.5.  alendronate for two years, then teeth fell out.  Prolia x2 infections,then Reclast started 12/13.  No fracture Hx.       Shingles 2009    Thorasic, no post herpetic neuralgia.     PSHx:    Past Surgical History:   Procedure Laterality Date     TONSILECTOMY, ADENOIDECTOMY, BILATERAL MYRINGOTOMY AND TUBES      No other surgeries.       Immunizations:   Immunization History   Administered Date(s) Administered     Influenza high dose,seasonal,PF, 65+ yrs 09/14/2015, 11/08/2016, 10/16/2017, 09/25/2018     Influenza, inj, historic,unspecified 09/01/2014     Pneumo Conj 13-V (2010&after) 12/05/2014     Pneumo Polysac 23-V 03/10/2017       ROS A comprehensive review of systems was performed and was otherwise negative    Medications, allergies, and problem list were reviewed and updated    Exam  /52 (Patient Site: Right Arm, Patient Position: Sitting, Cuff Size: Adult Regular)   Pulse 80   Wt 113 lb (51.3 kg)   BMI 19.10 kg/m    Appears well.  Alert and oriented.  Able to climb up on the exam table.  Lungs  with slight decreased breath sounds throughout but otherwise clear and no wheezing or crackles.  Mild reduced respiratory excursion.  Heart is regular without murmur.  No ankle edema.  Abdomen soft nontender, thin.    Assessment/Plan  1. Essential hypertension  Blood pressure was 132/60 on my recheck.  She denies any worsening orthostasis.  I did discuss with patient that she may benefit from a lower dose of lisinopril if she has worsening lightheaded dizzy spells or fatigue.    Follow-up in 2 months for reevaluation.    No edema with amlodipine 10 mg a day.    2. Hyponatremia  Borderline.  Has been worse in the past with anxiety and poor oral nutrition and polydipsia.  She is taking the 1 sodium chloride pill daily now.    Follow-up in December, plan to check sodium and kidney labs at that time.    3. Anxiety  Some increased with her mother moving to [place], but now improving.  She feels she is doing well and does enjoy time with her mother.    Continue Prozac 20 mg a day.    A refill of lorazepam was given.  I did discuss risk of lorazepam use with patient including effects on memory, balance and fall risk with this medication.  Patient feels the benefit outweighs the risk and wishes to continue to have it.  - LORazepam (ATIVAN) 0.5 MG tablet; TAKE 1 TABLET BY MOUTH TWICE A DAY AS NEEDED FOR ANXIETY.  Dispense: 30 tablet; Refill: 3    She has lost 1 pound has not been eating regularly with some increased stress of caring for her mother.  I did discuss with patient how important regular meals are, she promised to have a sitdown supper meal every night and try to eat more regular meals with healthy foods.    4. Pulmonary emphysema, unspecified emphysema type (H)  Moderately severe but stable.  Continue current Symbicort twice daily and albuterol as needed.  1 year follow-up with pulmonology clinic in August.    Does have bronchiectasis, no recurrent bronchitis at this time.    Flu shot given today.    She has  prednisone 40 mill grams a day for 5 days and a Z-Pedro available from her pulmonary clinic in case of future bronchitis exacerbation.    5. Macular degeneration, unspecified laterality, unspecified type  Follow-up with ophthalmology    6. Vitamin D deficiency  2000 IU daily vitamin D    7. Need for immunization against influenza  Given today  - Influenza High Dose,Seasonal,PF 65+ Yrs    History of osteoporosis, she did not want to do further DEXA scan or consider Prolia.    Return in about 2 months (around 12/3/2019) for Recheck.   Patient Instructions   Make sure you schedule a full, healthy supper every evening.  You need a sitdown meal at least once a day to properly digest your food.    If you have any increasing lightheaded dizzy spells, or blood pressures less than 115/60, contact me and I would have you consider reduction in your lisinopril dose.    Follow-up in December for checkup        Usman Gaines MD        Current Outpatient Medications   Medication Sig Dispense Refill     albuterol (PROAIR HFA;PROVENTIL HFA;VENTOLIN HFA) 90 mcg/actuation inhaler Inhale 2 puffs every 6 (six) hours as needed for wheezing. 1 each 6     amLODIPine (NORVASC) 10 MG tablet TAKE 1 TABLET (10 MG TOTAL) BY MOUTH DAILY. 90 tablet 3     budesonide-formoterol (SYMBICORT) 160-4.5 mcg/actuation inhaler Inhale 2 puffs 2 (two) times a day. 1 Inhaler 12     calcium carbonate (OS-NATALEE) 600 mg (1,500 mg) tablet Take 600 mg by mouth daily.        cholecalciferol, vitamin D3, (VITAMIN D3) 2,000 unit Tab Once a day 100 each 3     FLUoxetine (PROZAC) 20 MG capsule TAKE 1 CAPSULE BY MOUTH ONE TIME DAILY 90 capsule 3     ibuprofen (ADVIL,MOTRIN) 200 MG tablet Take 200 mg by mouth every 6 (six) hours as needed for pain (takes 2-3 tablets twice a day for joint aches).       lisinopril (PRINIVIL,ZESTRIL) 40 MG tablet TAKE 1 TABLET (40 MG TOTAL) BY MOUTH DAILY. 90 tablet 3     LORazepam (ATIVAN) 0.5 MG tablet TAKE 1 TABLET BY MOUTH TWICE A DAY AS  NEEDED FOR ANXIETY. 30 tablet 3     ondansetron (ZOFRAN ODT) 4 MG disintegrating tablet Take 1 tablet (4 mg total) by mouth every 8 (eight) hours as needed for nausea. 10 tablet 1     SODIUM CL/POTASSIUM CHLORIDE (THERMOTABS ORAL) Take 1 tablet by mouth daily.              vitamin A-vitamin C-vit E-min (OCUVITE) Tab tablet Take by mouth daily.       vitamin C-vitamin E (CRANBERRY CONCENTRATE) cap Take 2 tablets by mouth once daily.       No current facility-administered medications for this visit.      Allergies   Allergen Reactions     Sulfa (Sulfonamide Antibiotics) Diarrhea and Nausea Only     Also rash, throat swelling     Incruse Ellipta [Umeclidinium] Angioedema     Mouth and tongue swelling     Social History     Tobacco Use     Smoking status: Former Smoker     Types: Cigarettes     Last attempt to quit: 2010     Years since quittin.8     Smokeless tobacco: Never Used   Substance Use Topics     Alcohol use: No     Drug use: No

## 2021-06-03 VITALS — BODY MASS INDEX: 18.42 KG/M2 | WEIGHT: 109 LBS

## 2021-06-03 VITALS
SYSTOLIC BLOOD PRESSURE: 122 MMHG | WEIGHT: 113 LBS | HEART RATE: 80 BPM | DIASTOLIC BLOOD PRESSURE: 52 MMHG | BODY MASS INDEX: 19.1 KG/M2

## 2021-06-03 VITALS — BODY MASS INDEX: 19.4 KG/M2 | WEIGHT: 114.8 LBS

## 2021-06-04 VITALS
HEART RATE: 80 BPM | DIASTOLIC BLOOD PRESSURE: 60 MMHG | BODY MASS INDEX: 18.76 KG/M2 | WEIGHT: 111 LBS | SYSTOLIC BLOOD PRESSURE: 140 MMHG

## 2021-06-04 VITALS — WEIGHT: 113 LBS | BODY MASS INDEX: 19.29 KG/M2 | HEIGHT: 64 IN

## 2021-06-04 NOTE — TELEPHONE ENCOUNTER
Patient's daughter Larry notified of clinician's message and verbalized understanding. No further questions at this time. [place] on file.

## 2021-06-04 NOTE — TELEPHONE ENCOUNTER
Contact patient.  Tell her her labs from yesterday were okay except for a mildly low potassium of 3.3.    Continue on current medications, she will be stopping her sodium chloride pill, however.    But, have her increase potassium containing foods such as fruits and vegetables.  Such as eating a banana daily.    Otherwise, no change in plan and recheck labs in March at her next visit.

## 2021-06-04 NOTE — PROGRESS NOTES
[facility] clinic Follow Up Note    Celeste Peguero   79 y.o. female    Date of Visit: 12/26/2019    Chief Complaint   Patient presents with     Follow-up     Subjective  Jesse is here for follow-up of multiple medical problems.    She did have upper respiratory illness last month and did take her course of prednisone.  He apparently did not have the azithromycin pack available and did not take but she did recover from that illness.  She continues with her inhalers.  Past history of Spiriva allergy.    December 2018 FEV1 was 0.67 or 32%.  Quit smoking in 2010.  Saw her pulmonary clinic in August with 1 year follow-up plan.    Last chest imaging was CT scan November 2017 with a 2 mm left lower lobe nodule and some bronchiectasis but she has not wanted repeat CT scan.  No hemoptysis.    She has lost another 2 pounds.  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  She is still fixing meals for her mother but is looking into getting some additional assistance 2 days a week.    Patient states that she still eating regular meals.  Enjoys eating brie cheese, eat some eggs.    Patient denies any abdominal pain or diarrhea or swallowing problems.    She has a history of anxiety and not always eating regular meals.    She states she is taking her sodium chloride pill currently.  History of low sodium.    She has had some increased lower extremity edema over the past 2 weeks bilateral.  No increasing shortness of breath and no significant discomfort with that.    She is on lisinopril 40 mg a day and amlodipine 10 mg a day.    In October her blood pressure was 132/60.  She has not checked blood pressure on her own.  No orthostasis.    Normal TSH in 2017.    Anxiety controlled with Prozac and occasional lorazepam.  No falls.    Severe macular degeneration but gets followed by ophthalmology and is up-to-date.    She is taking her vitamin D 2000 IU a day for past history of low vitamin  D.    Past history of osteoporosis but she is not wanting to do further DEXA scans or treatment.    No chest pain or palpitations.  No mouth sores.  No headache complaints.    PMHx:    Past Medical History:   Diagnosis Date     Acute H. pylori gastric ulcer 2010    Treated with no further Sx     Anxiety and depression     Stabilized on prozac 20mg qday.  Uses prn lorazepam.     Hiatal hernia      HTN (hypertension)      Hyponatremia     severe/ hospitalized when Tx for H. pylori/ ulcer.  No longer on HCTZ.     Macular degeneration     Wet with submacular hem R eye.  Avastin shots given x4 8/11 - 1/12.     Osteoporosis     4/13 DEXA S-1.9/F-3.5.  alendronate for two years, then teeth fell out.  Prolia x2 infections,then Reclast started 12/13.  No fracture Hx.       Shingles 2009    Thorasic, no post herpetic neuralgia.     PSHx:    Past Surgical History:   Procedure Laterality Date     TONSILECTOMY, ADENOIDECTOMY, BILATERAL MYRINGOTOMY AND TUBES      No other surgeries.       Immunizations:   Immunization History   Administered Date(s) Administered     Influenza high dose,seasonal,PF, 65+ yrs 09/14/2015, 11/08/2016, 10/16/2017, 09/25/2018, 10/03/2019     Influenza, inj, historic,unspecified 09/01/2014     Pneumo Conj 13-V (2010&after) 12/05/2014     Pneumo Polysac 23-V 03/10/2017       ROS A comprehensive review of systems was performed and was otherwise negative    Medications, allergies, and problem list were reviewed and updated    Exam  /60 (Patient Site: Right Arm, Patient Position: Sitting, Cuff Size: Adult Regular)   Pulse 80   Wt 111 lb (50.3 kg)   BMI 18.76 kg/m    Alert and oriented.  Lungs with decreased breath sounds throughout but no wheezing or crackles.  Heart is regular without murmur.  Abdomen soft nontender no epigastric tenderness.  There is trace to +1 ankle edema bilaterally without discomfort.    Assessment/Plan  1. Pulmonary emphysema, unspecified emphysema type (H)  Exacerbation last  month but now back to baseline.    She was given another prescription for prednisone and azithromycin in case of future bronchitis occurrence.  She does have a history of bronchiectasis.    Continue current Symbicort inhaler.    She has had the flu shot.      - predniSONE (DELTASONE) 20 MG tablet; Take 2 tablets (40 mg) on the first day and 1 tablet (20 mg) until gone. Taken with azithromycin..  Dispense: 6 tablet; Refill: 0  - azithromycin (ZITHROMAX) 250 MG tablet; Take two pills the first day and then one pill a day for four more days.  Take with prednisone prescription, if respiratory infection  Dispense: 6 tablet; Refill: 0    2. Essential hypertension  Controlled.  Blood pressure was 140/60 on my recheck.  Continue lisinopril and amlodipine.    Of her lower extremity edema does not improve, I may need to consider a lower dose of amlodipine, and possibly other blood pressure medication options.    History of low sodium, therefore no diuretic, least no hydrochlorothiazide.    I will have her stop the sodium chloride pill with her leg swelling.  It was not on her medication list but she did stated that she was taking it.    3. Hyponatremia  Associated with anxiety and poor p.o. intake in the past.  Had been taking sodium chloride but that was discontinued.  Checking labs today.    4. Anxiety  Controlled.  Some moderate stress dealing with her mother, but patient reports that that is better recently and does not want to change her routine.  I did encourage her to allow home health aide or further assistance to care for her mother so that she can have more time for regular exercise or getting to the exercise room.    2 pounds further weight loss.  I encouraged her to continue to eat calorie dense meals.  Avoid over straining with caring for her mother.  Patient does states that she has a sitdown meal with supper every day and that she is eating okay.    5. Macular degeneration, unspecified laterality, unspecified  type  Stable.  Sees ophthalmology.    6. Vitamin D deficiency  International units 2000 a day    7. Medication monitoring encounter    - Comprehensive Metabolic Panel  - HM2(CBC w/o Differential)    8. Fatigue, unspecified type    - Thyroid Stimulating Hormone (TSH)    Osteoporosis she has not wanted further DEXA scans or discussion of treatment.    Osteoarthritis pushing wheelchair of her mother, she states that better as she does not do as much pushing.    Still enjoying her cats juanito and ginger.    No follow-ups on file.   Patient Instructions   You have been given a refill of your prednisone and azithromycin, to have on hand in case you get a future respiratory infection.  Do not take these medications at this time.    Stop your sodium chloride pill that you are taking.    If your leg swelling does not improve, contact me to discuss reducing amlodipine.    Routine follow-up in 3 months.    Results of today's blood work will be mailed to you.    Usman Gaines MD        Current Outpatient Medications   Medication Sig Dispense Refill     albuterol (PROAIR HFA;PROVENTIL HFA;VENTOLIN HFA) 90 mcg/actuation inhaler Inhale 2 puffs every 6 (six) hours as needed for wheezing. 1 each 6     amLODIPine (NORVASC) 10 MG tablet TAKE 1 TABLET (10 MG TOTAL) BY MOUTH DAILY. 90 tablet 3     budesonide-formoterol (SYMBICORT) 160-4.5 mcg/actuation inhaler Inhale 2 puffs 2 (two) times a day. 1 Inhaler 12     calcium carbonate (OS-NATALEE) 600 mg (1,500 mg) tablet Take 600 mg by mouth daily.        cholecalciferol, vitamin D3, (VITAMIN D3) 2,000 unit Tab Once a day 100 each 3     FLUoxetine (PROZAC) 20 MG capsule TAKE 1 CAPSULE BY MOUTH ONE TIME DAILY 90 capsule 3     ibuprofen (ADVIL,MOTRIN) 200 MG tablet Take 200 mg by mouth every 6 (six) hours as needed for pain (takes 2-3 tablets twice a day for joint aches).       lisinopril (PRINIVIL,ZESTRIL) 40 MG tablet TAKE 1 TABLET (40 MG TOTAL) BY MOUTH DAILY. 90 tablet 3     LORazepam (ATIVAN)  0.5 MG tablet TAKE 1 TABLET BY MOUTH TWICE A DAY AS NEEDED FOR ANXIETY. 30 tablet 3     ondansetron (ZOFRAN ODT) 4 MG disintegrating tablet Take 1 tablet (4 mg total) by mouth every 8 (eight) hours as needed for nausea. 10 tablet 1     SODIUM CL/POTASSIUM CHLORIDE (THERMOTABS ORAL) Take 1 tablet by mouth daily.              vitamin A-vitamin C-vit E-min (OCUVITE) Tab tablet Take by mouth daily.       vitamin C-vitamin E (CRANBERRY CONCENTRATE) cap Take 2 tablets by mouth once daily.       azithromycin (ZITHROMAX) 250 MG tablet Take two pills the first day and then one pill a day for four more days.  Take with prednisone prescription, if respiratory infection 6 tablet 0     predniSONE (DELTASONE) 20 MG tablet Take 2 tablets (40 mg) on the first day and 1 tablet (20 mg) until gone. Taken with azithromycin.. 6 tablet 0     No current facility-administered medications for this visit.      Allergies   Allergen Reactions     Sulfa (Sulfonamide Antibiotics) Diarrhea and Nausea Only     Also rash, throat swelling     Incruse Ellipta [Umeclidinium] Angioedema     Mouth and tongue swelling     Social History     Tobacco Use     Smoking status: Former Smoker     Types: Cigarettes     Last attempt to quit: 2010     Years since quittin.0     Smokeless tobacco: Never Used   Substance Use Topics     Alcohol use: No     Drug use: No

## 2021-06-04 NOTE — PATIENT INSTRUCTIONS - HE
You have been given a refill of your prednisone and azithromycin, to have on hand in case you get a future respiratory infection.  Do not take these medications at this time.    Stop your sodium chloride pill that you are taking.    If your leg swelling does not improve, contact me to discuss reducing amlodipine.    Routine follow-up in 3 months.    Results of today's blood work will be mailed to you.

## 2021-06-06 NOTE — TELEPHONE ENCOUNTER
Refill Approved    Rx renewed per Medication Renewal Policy. Medication was last renewed on   amLODIPine (NORVASC) 10 MG tablet 90 tablet 3 2/28/2020     Kaelyn Coffey, [facility] Triage/Med Refill 2/28/2020     Requested Prescriptions   Pending Prescriptions Disp Refills     amLODIPine (NORVASC) 10 MG tablet [Pharmacy Med Name: AMLODIPINE 10 MG TAB 10 TAB] 90 tablet 3     Sig: TAKE 1 TABLET (10 MG TOTAL) BY MOUTH DAILY.       Calcium-Channel Blockers Protocol Passed - 2/28/2020  4:53 PM        Passed - PCP or prescribing provider visit in past 12 months or next 3 months     Last office visit with prescriber/PCP: 12/26/2019 Usman Gaines MD OR same dept: 12/26/2019 Usman Gaines MD OR same specialty: 12/26/2019 Usman Gaines MD  Last physical: Visit date not found Last MTM visit: Visit date not found   Next visit within 3 mo: Visit date not found  Next physical within 3 mo: Visit date not found  Prescriber OR PCP: Usman Gaines MD  Last diagnosis associated with med order: 1. HTN (hypertension)  - amLODIPine (NORVASC) 10 MG tablet [Pharmacy Med Name: AMLODIPINE 10 MG TAB 10 TAB]; Take 1 tablet (10 mg total) by mouth daily.  Dispense: 90 tablet; Refill: 3    If protocol passes may refill for 12 months if within 3 months of last provider visit (or a total of 15 months).             Passed - Blood pressure filed in past 12 months     BP Readings from Last 1 Encounters:   12/26/19 140/60

## 2021-06-06 NOTE — TELEPHONE ENCOUNTER
Refill Approved    Rx renewed per Medication Renewal Policy. Medication was last renewed on 3/7/19.    Kaelyn Coffey, [facility] Triage/Med Refill 2/28/2020     Requested Prescriptions   Pending Prescriptions Disp Refills     amLODIPine (NORVASC) 10 MG tablet [Pharmacy Med Name: AMLODIPINE 10 MG TAB 10 TAB] 90 tablet 11     Sig: TAKE 1 TABLET (10 MG TOTAL) BY MOUTH DAILY.       Calcium-Channel Blockers Protocol Passed - 2/28/2020 10:20 AM        Passed - PCP or prescribing provider visit in past 12 months or next 3 months     Last office visit with prescriber/PCP: 12/26/2019 Usman Gaines MD OR same dept: 12/26/2019 Usman Gaines MD OR same specialty: 12/26/2019 Usman Gaines MD  Last physical: Visit date not found Last MTM visit: Visit date not found   Next visit within 3 mo: Visit date not found  Next physical within 3 mo: Visit date not found  Prescriber OR PCP: Usman Gaines MD  Last diagnosis associated with med order: 1. HTN (hypertension)  - amLODIPine (NORVASC) 10 MG tablet [Pharmacy Med Name: AMLODIPINE 10 MG TAB 10 TAB]; TAKE 1 TABLET (10 MG TOTAL) BY MOUTH DAILY.  Dispense: 90 tablet; Refill: 11    If protocol passes may refill for 12 months if within 3 months of last provider visit (or a total of 15 months).             Passed - Blood pressure filed in past 12 months     BP Readings from Last 1 Encounters:   12/26/19 140/60

## 2021-06-07 NOTE — TELEPHONE ENCOUNTER
Controlled Substance Refill Request  Medication Name:   Requested Prescriptions     Pending Prescriptions Disp Refills     LORazepam (ATIVAN) 0.5 MG tablet [Pharmacy Med Name: LORAZEPAM 0.5 MG TAB 0.5 TAB] 30 tablet 3     Sig: TAKE 1 TABLET BY MOUTH TWICE A DAY AS NEEDED FOR ANXIETY.     Date Last Fill: 10/3/19  [facility]  Submit electronically to pharmacy  Controlled Substance Agreement on file:   Encounter-Level CSA Scan Date:    There are no encounter-level csa scan date.        Last office visit:  12/26/19

## 2021-06-09 NOTE — PROGRESS NOTES
[facility] Follow Up Note    Celeste Beach Peguero   76 y.o. female    Date of Visit: 3/10/2017    Chief Complaint   Patient presents with     Follow-up     Subjective  Suzanna is here for routine follow-up for hypertension.  Her   last month.  He was chronically ill, death anticipated.  She's had some increased insomnia and anxiety, but improving now.  Getting good support from her son.  She has good social network at [facility], walks her dog regularly.  Has not been swimming lately.    No flare of her emphysema.  She quit smoking in .  Last chest CT scan was negative in .  No new cough or hemoptysis.  No swallowing difficulty.    No chest pain or palpitations.  Cardiac CT scan in 2015 was a score of 0.  Ultrasound of carotids at that time showed mild plaque but no stenosis.  No AAA.    Cholesterol levels were moderately high, but high HDL.  2015 HDL 81/.  No plans for statin with age and frail condition.    Retention has been well controlled blood pressure in the 120s over 70s.  No lightheaded dizzy spells.  No blood pressure spikes recently.  Still on amlodipine 5 mg a day and lisinopril 40 mg a day.    She has a history of hyponatremia.  Often associated with anxiety and poor oral intake in the past.  Sodium has been normal recently.  Still taking 2 tablets of the sodium chloride daily.  No edema.    Still on Prozac.  She has been using lorazepam for many years.  She has been cutting in half or 1 mg tablet and using it occasionally, not more than twice a day.  I discussed with her significant risk of using that medication, especially with her frail state and her blindness.  I discussed the fall risk, also effects on confusion and memory.  She accepts this risk and feels the benefit outweighs the risk given her history of severe anxiety, and the recent death of her .  She is asking for refill.    Past history of osteoporosis.  She has  "been on Reclast since January 2013.  Last dose every 2016.  Plan was for 5 years.  She is missing her teeth now.  Still on vitamin D 2000 IU a day.  Vitamin D level in November last year was 33.    Last bone density April 2013 with a femur score of -3.5 and spine score of -1.9.    No urinary or bowel complaints.    Denies abdominal pain.  Treated for H. pylori associated ulcer in 2010.    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  Followed closely by ophthalmology.    PMHx:    Past Medical History:   Diagnosis Date     Acute H. pylori gastric ulcer 2010    Treated with no further Sx     Anxiety and depression     Stabilized on prozac 20mg qday.  Uses prn lorazepam.     Hiatal hernia      HTN (hypertension)      Hyponatremia     severe/ hospitalized when Tx for H. pylori/ ulcer.  No longer on HCTZ.     Macular degeneration     Wet with submacular hem R eye.  Avastin shots given x4 8/11 - 1/12.     Osteoporosis     4/13 DEXA S-1.9/F-3.5.  alendronate for two years, then teeth fell out.  Prolia x2 infections,then Reclast started 12/13.  No fracture Hx.       Shingles 2009    Thorasic, no post herpetic neuralgia.     PSHx:    Past Surgical History:   Procedure Laterality Date     TONSILECTOMY, ADENOIDECTOMY, BILATERAL MYRINGOTOMY AND TUBES      No other surgeries.       Immunizations:   Immunization History   Administered Date(s) Administered     Influenza high dose, seasonal 09/14/2015, 11/08/2016     Influenza, inj, historic 09/01/2014     Pneumo Conj 13-V (2010&after) 12/05/2014     Pneumo Polysac 23-V 03/10/2017       ROS A comprehensive review of systems was performed and was otherwise negative    Medications, allergies, and problem list were reviewed and updated    Exam  Visit Vitals     /70     Pulse 77     Ht 5' 4.5\" (1.638 m)     Wt 123 lb (55.8 kg)     SpO2 95%     BMI 20.79 kg/m2     Patient is alert and oriented.  Appears calm.  He is mildly anxious.  Lungs are " clear.  Good respiratory excursion, just mildly decreased.  No wheezing or crackles.  Heart is regular without murmur.  No edema.  Abdomen is nontender.  Patient able to climb up on the exam table without assistance.    Assessment/Plan  1. Anxiety  Chronic, but flare with recent death of .  Strong warnings given to patient as above.  She believes benefit outweighs the risk.  I did ask that she use a lower dose Ativan, just the 0.5 mg tablets.  Continue Prozac.  - LORazepam (ATIVAN) 0.5 MG tablet; TAKE ONE TABLET BY MOUTH TWICE DAILY AS NEEDED FOR ANXIETY.  Dispense: 30 tablet; Refill: 2    2. Essential hypertension with goal blood pressure less than 140/90  Well-controlled.  Continue current lisinopril with amlodipine.  History of low sodium, okay recently.  She does wish to continue on the 2 sodium tablets a day.    Could take an extra 5 mg amlodipine of high blood pressure spikes in the future.    3. Macular degeneration  Stable, severe.  Nearly blind.  Has adaptive equipment, followed closely by ophthalmology.    4. Osteoporosis  She is due for her yearly Reclast infusion, she will schedule that.  I did discuss risk of brittle bone and fracture with that with long-term use.  Plan is to continue Reclast for 5 years, which is coming due next year.    Continue vitamin D 2000 IU a day and I encouraged her to continue to walk daily.    5. Medication monitoring encounter    - Basic Metabolic Panel    Emphysema, no flare.  Pneumovax 23 given today    DNR/DNI.    Return in about 4 months (around 7/10/2017) for Recheck.   There are no Patient Instructions on file for this visit.  Usman Gaines MD  Total time with patient over 25 minutes and over 50% coord care.  Time all face to face.      Current Outpatient Prescriptions   Medication Sig Dispense Refill     amLODIPine (NORVASC) 5 MG tablet Take 1 tablet (5 mg total) by mouth daily. 90 tablet 3     calcium carbonate (OS-NATALEE) 600 mg (1,500 mg) tablet Take 600 mg by  mouth daily.        cholecalciferol, vitamin D3, (VITAMIN D3) 2,000 unit Tab Once a day 100 each 3     FLUoxetine (PROZAC) 20 MG capsule Take 1 capsule (20 mg total) by mouth daily. 90 capsule 3     ibuprofen (ADVIL,MOTRIN) 200 MG tablet Take 200 mg by mouth every 6 (six) hours as needed for pain (takes 2-3 tablets twice a day for joint aches).       lisinopril (PRINIVIL,ZESTRIL) 40 MG tablet Take 1 tablet (40 mg total) by mouth daily. 90 tablet 3     LORazepam (ATIVAN) 0.5 MG tablet TAKE ONE TABLET BY MOUTH TWICE DAILY AS NEEDED FOR ANXIETY. 30 tablet 2     SODIUM CL/POTASSIUM CHLORIDE (THERMOTABS ORAL) Take 2 tablets by mouth daily.        vitamin A-vitamin C-vit E-min (OCUVITE) Tab tablet Take by mouth daily.       vitamin C-vitamin E (CRANBERRY CONCENTRATE) cap Take 2 tablets by mouth once daily.       No current facility-administered medications for this visit.      Allergies   Allergen Reactions     Sulfa (Sulfonamide Antibiotics) Diarrhea and Nausea Only     Social History   Substance Use Topics     Smoking status: Former Smoker     Types: Cigarettes     Quit date: 12/5/2011     Smokeless tobacco: None     Alcohol use No

## 2021-06-09 NOTE — PATIENT INSTRUCTIONS - HE
Schedule a drive-up lab draw appointment to check your sodium and kidney labs sometime in the next couple of weeks.  Do not fast for this lab.    Get your flu shot in September.    Schedule a virtual-video visit with me in approximately 3 months.    Contact me if blood pressure is running above 140/85.  Contact me if you are having increasing leg swelling.    Azithromycin and a prednisone prescription has been given for you to have on hand in case of emphysema exacerbation in the future.    Schedule a routine follow-up with your pulmonary doctor this fall.

## 2021-06-09 NOTE — PROGRESS NOTES
"Celeste Peguero is a 80 y.o. female who is being evaluated via a billable telephone visit.      The patient has been notified of following:     \"This telephone visit will be conducted via a call between you and your physician/provider. We have found that certain health care needs can be provided without the need for a physical exam.  This service lets us provide the care you need with a short phone conversation.  If a prescription is necessary we can send it directly to your pharmacy.  If lab work is needed we can place an order for that and you can then stop by our lab to have the test done at a later time.    Telephone visits are billed at different rates depending on your insurance coverage. During this emergency period, for some insurers they may be billed the same as an in-person visit.  Please reach out to your insurance provider with any questions.    If during the course of the call the physician/provider feels a telephone visit is not appropriate, you will not be charged for this service.\"    Patient has given verbal consent to a Telephone visit? Yes    What phone number would you like to be contacted at? [phone]    Patient would like to receive their AVS by AVS Preference: Ankit.    Additional provider notes:  [facility] Follow Up Note    Celeste Peguero   80 y.o. female    Date of Visit: 7/23/2020    Chief Complaint   Patient presents with     Follow-up     Subjective  Suzanna scheduled a virtual visit to avoid coronavirus exposure during the outbreak.    Video visit was initiated, but poor Internet connection and difficulty with audio.  We did get good video.    Patient reports that she is doing quite well.  She is active on a daily basis helping care for her 97-year-old mother who is doing well.    Patient is walking on a daily basis.  Stable exertional ability and no falls.    Anxiety has been well controlled on the Prozac.  Just rarely uses lorazepam about twice a week " for situational anxiety, mainly to do with the stress of her mother.    She denies any confusional episodes.    Her cats Hardy and ari are doing well.    Patient has severe COPD with an FEV1 of 0.67 at 32% in 2018.  She quit smoking in 2010.  She has not had an exacerbation of her emphysema recently.  She has used a Z-Pedro and prednisone in the past but does not have that available currently.    Using her Symbicort twice a day.  No sore throat issues.    I did review the CT scan from 2017 with a 2 mm left lower lobe nodule and bronchiectasis but patient has not wanted to repeat CT scan.    No chest pain or palpitations.    Last December TSH, hemogram, blood sugar and kidney liver test normal.  Her potassium was borderline low at 3.3.    Patient is eating regular meals.  No significant nausea.    She has a history of chronic anxiety and poor appetite in the past leading to a low sodium but her sodium levels have been normal recently.    She had some puffiness of her ankles and elevated blood pressure in the past, and I had asked her to go off the sodium chloride pill.  But she states she is now taking the sodium chloride pill 1 daily.    She denies any edema.    She states her blood pressure was 130/70s 3 weeks ago and has been stable prior to that.  She denies orthostasis.    She continues on amlodipine 10 mg a day and lisinopril 40 mg a day.    She states she uses Tylenol about 3 times a week for intermittent musculoskeletal pain which is chronic.  She has not used much ibuprofen but has used it in the past.    No chest pain or chest pressure.    She is up-to-date on her macular degeneration follow-ups.    She is taking her vitamin D daily.  Past history of osteoporosis but she is not want to consider treatment or further DEXA scans.    She is at [facility].    PMHx:    Past Medical History:   Diagnosis Date     Acute H. pylori gastric ulcer 2010    Treated with no further Sx     Anxiety and depression      Stabilized on prozac 20mg qday.  Uses prn lorazepam.     Hiatal hernia      HTN (hypertension)      Hyponatremia     severe/ hospitalized when Tx for H. pylori/ ulcer.  No longer on HCTZ.     Macular degeneration     Wet with submacular hem R eye.  Avastin shots given x4 8/11 - 1/12.     Osteoporosis     4/13 DEXA S-1.9/F-3.5.  alendronate for two years, then teeth fell out.  Prolia x2 infections,then Reclast started 12/13.  No fracture Hx.       Shingles 2009    Thorasic, no post herpetic neuralgia.     PSHx:    Past Surgical History:   Procedure Laterality Date     TONSILECTOMY, ADENOIDECTOMY, BILATERAL MYRINGOTOMY AND TUBES      No other surgeries.       Immunizations:   Immunization History   Administered Date(s) Administered     Influenza high dose,seasonal,PF, 65+ yrs 09/14/2015, 11/08/2016, 10/16/2017, 09/25/2018, 10/03/2019     Influenza, inj, historic,unspecified 09/01/2014     Pneumo Conj 13-V (2010&after) 12/05/2014     Pneumo Polysac 23-V 03/10/2017       ROS A comprehensive review of systems was performed and was otherwise negative    Medications, allergies, and problem list were reviewed and updated    Exam  There were no vitals taken for this visit.  She appears well on video.  She is able take a deep breath, does not appear to have limited respiratory excursion and no wheezing.  Color is good.  Ankles were examined and there is no visible ankle edema on video imaging.    Assessment/Plan  1. Essential hypertension  Controlled per patient's report.  Continue to follow on home checks.    Continue lisinopril 40 mg a day and amlodipine 10 mg a day.    Patient was told if increased edema or blood pressure, that she may need to at least temporarily come off the sodium chloride pill again.  She will continue on 1 a day at this time.    2. Pulmonary emphysema, unspecified emphysema type (H)  No flare at this time.  Continue inhaler twice a day.    Routine yearly follow-up with pulmonary doctor is due after  next month.    She was given refill of the azithromycin and prednisone in case of COPD exacerbation in the future.    I did discuss getting a flu shot in September, soon as possible.  - azithromycin (ZITHROMAX) 250 MG tablet; Take two pills the first day and then one pill a day for four more days.  Take with prednisone prescription, if respiratory infection  Dispense: 6 tablet; Refill: 0  - predniSONE (DELTASONE) 20 MG tablet; Take 2 tablets (40 mg) on the first day and 1 tablet (20 mg) until gone. Taken with azithromycin..  Dispense: 6 tablet; Refill: 0    3. Hyponatremia  Normal on last check.  She will set up a drive up lab check this month  - Basic Metabolic Panel; Future  - sodium chloride 1 gram tablet; Take 1 tablet (1 g total) by mouth daily.; Refill: 0    4. Anxiety  Controlled.  Patient given warnings on Lorazepam use including fall risk.  Patient accepts risk and feels benefit outweighs risk.  - LORazepam (ATIVAN) 0.5 MG tablet; Take 1 tablet (0.5 mg total) by mouth 2 (two) times a day.  Dispense: 30 tablet; Refill: 1    5. Macular degeneration, unspecified laterality, unspecified type  Up-to-date on eye injections and seeing ophthalmology    6. Vitamin D deficiency  Taking 2000 IU of vitamin D daily    7. Encounter for therapeutic drug monitoring    - Basic Metabolic Panel; Future    Return in about 3 months (around 10/23/2020) for Virtual video follow-up visit.   Patient Instructions   Schedule a drive-up lab draw appointment to check your sodium and kidney labs sometime in the next couple of weeks.  Do not fast for this lab.    Get your flu shot in September.    Schedule a virtual-video visit with me in approximately 3 months.    Contact me if blood pressure is running above 140/85.  Contact me if you are having increasing leg swelling.    Azithromycin and a prednisone prescription has been given for you to have on hand in case of emphysema exacerbation in the future.    Schedule a routine follow-up  with your pulmonary doctor this fall.        Usman Gaines MD        Current Outpatient Medications   Medication Sig Dispense Refill     albuterol (PROAIR HFA;PROVENTIL HFA;VENTOLIN HFA) 90 mcg/actuation inhaler Inhale 2 puffs every 6 (six) hours as needed for wheezing. 1 each 6     amLODIPine (NORVASC) 10 MG tablet Take 1 tablet (10 mg total) by mouth daily. 90 tablet 3     budesonide-formoterol (SYMBICORT) 160-4.5 mcg/actuation inhaler Inhale 2 puffs 2 (two) times a day. 1 Inhaler 12     calcium carbonate (OS-NATALEE) 600 mg (1,500 mg) tablet Take 600 mg by mouth daily.        cholecalciferol, vitamin D3, (VITAMIN D3) 2,000 unit Tab Once a day 100 each 3     FLUoxetine (PROZAC) 20 MG capsule TAKE 1 CAPSULE BY MOUTH ONE TIME DAILY 90 capsule 3     ibuprofen (ADVIL,MOTRIN) 200 MG tablet Take 200 mg by mouth every 6 (six) hours as needed for pain (takes 2-3 tablets twice a day for joint aches).       lisinopril (PRINIVIL,ZESTRIL) 40 MG tablet TAKE 1 TABLET (40 MG TOTAL) BY MOUTH DAILY. 90 tablet 3     LORazepam (ATIVAN) 0.5 MG tablet Take 1 tablet (0.5 mg total) by mouth 2 (two) times a day. 30 tablet 1     ondansetron (ZOFRAN ODT) 4 MG disintegrating tablet Take 1 tablet (4 mg total) by mouth every 8 (eight) hours as needed for nausea. 10 tablet 1     vitamin A-vitamin C-vit E-min (OCUVITE) Tab tablet Take by mouth daily.       vitamin C-vitamin E (CRANBERRY CONCENTRATE) cap Take 2 tablets by mouth once daily.       azithromycin (ZITHROMAX) 250 MG tablet Take two pills the first day and then one pill a day for four more days.  Take with prednisone prescription, if respiratory infection 6 tablet 0     predniSONE (DELTASONE) 20 MG tablet Take 2 tablets (40 mg) on the first day and 1 tablet (20 mg) until gone. Taken with azithromycin.. 6 tablet 0     sodium chloride 1 gram tablet Take 1 tablet (1 g total) by mouth daily.  0     No current facility-administered medications for this visit.      Allergies   Allergen  Reactions     Sulfa (Sulfonamide Antibiotics) Diarrhea and Nausea Only     Also rash, throat swelling     Incruse Ellipta [Umeclidinium] Angioedema     Mouth and tongue swelling     Social History     Tobacco Use     Smoking status: Former Smoker     Types: Cigarettes     Last attempt to quit: 2010     Years since quittin.6     Smokeless tobacco: Never Used   Substance Use Topics     Alcohol use: No     Drug use: No               Phone call duration: 13 minutes    Medina Coles, CMA

## 2021-06-10 NOTE — TELEPHONE ENCOUNTER
Controlled Substance Refill Request  Medication Name:   Requested Prescriptions     Pending Prescriptions Disp Refills     LORazepam (ATIVAN) 0.5 MG tablet [Pharmacy Med Name: LORAZEPAM 0.5 MG TAB 0.5 TAB] 30 tablet 0     Sig: TAKE 1 TABLET BY MOUTH TWICE A DAY AS NEEDED FOR ANXIETY.     Date Last Fill: 7/23/20  Requested Pharmacy: [facility]  Submit electronically to pharmacy  Controlled Substance Agreement on file:   Encounter-Level CSA Scan Date:    There are no encounter-level csa scan date.        Last office visit:  7/23/20  Jennifer Florentino RN, MA  [facility]    Triage Nurse Advisor

## 2021-06-10 NOTE — TELEPHONE ENCOUNTER
New Appointment Needed  What is the reason for the visit:    Drive up lab per Dr. Gaines - writer is unable to reschedule this  Provider Preference: Any available Lab appt - drive up  How soon do you need to be seen?: as soon as possible - unable to keep 7/30  Waitlist offered?: No  Okay to leave a detailed message:  Yes

## 2021-06-10 NOTE — PROGRESS NOTES
"Celeste Peguero is a 80 y.o. female who is being evaluated via a billable telephone visit.      The patient has been notified of following:     \"This telephone visit will be conducted via a call between you and your physician/provider. We have found that certain health care needs can be provided without the need for a physical exam.  This service lets us provide the care you need with a short phone conversation.  If a prescription is necessary we can send it directly to your pharmacy.  If lab work is needed we can place an order for that and you can then stop by our lab to have the test done at a later time.    Telephone visits are billed at different rates depending on your insurance coverage. During this emergency period, for some insurers they may be billed the same as an in-person visit.  Please reach out to your insurance provider with any questions.    If during the course of the call the physician/provider feels a telephone visit is not appropriate, you will not be charged for this service.\"    Patient has given verbal consent to a Telephone visit? Yes    What phone number would you like to be contacted at? [phone]     Patient would like to receive their AVS by AVS Preference: Ankit.    Additional provider notes:   She was last seen by Dr. Finnegan in Aug 2019.  Since that time, she reports doing well.  No issues with her COPD.  She feels great, has a lot of energy.   No issues with symbicort. Rare use of rescue inhaler.   She helps take care of her 98 year old mother who lives in the same facility.    Labs  Chem panel unremarkable  CBC OK, no diff    Imaging:  IMPRESSION:   CONCLUSION:  1.  Stable benign 2 mm left lower lobe nodule with no additional suspicious nodules.  2.  Moderate to severe centrilobular emphysema.  3.  Mild right middle lobe bronchiectasis with associated mild fibroatelectasis redemonstrated.    PFTs 2017  FEV1/FVC is 44 and is reduced.  FEV1 is 32% predicted and is " reduced.  FVC is 57% predicted and reduced.  There was improvement in spirometry after a single inhaled dose of bronchodilator.  Flow volume loop shows scooping indicating obstruction  TLC is 235% predicted and is increased.  RV is 450% predicted and is increased.  DLCO is 22% predicted and is reduced when it   is corrected for hemoglobin.     Impression:  Full Pulmonary Function Test is abnormal.  PFTs are consistent with severe obstructive disease.  Spirometry is consistent with reversibility.  There is hyperinflation.  There is air-trapping.  Diffusion capacity when corrected for hemoglobin is severely reduced.    Impression: Celeste Peguero is a 79 y.o. female with PMH significant HTN, tobacco use, and emphysema with COPD GOLD class B, here for annual follow up. Doing remarkably well, with good exercise and tolerance and no exacerbations or hospitalizations in the last year    Recommendations:  - continue Symbicort high dose 2 puffs two times a day. Rinse/gargle after use.  - continue albuterol as needed  - encouraged her to exercise and remain active  - UTD with pneumococcal vaccines. Recommend flu shot this Fall.  - no indication for add'l LDCT lung cancer screening given her age.     All questions anwered.  Follow up in 1 year.    Phone call duration: 15 minutes    Ayden Duenas MD (Avi)  [facility]  Pulmonary & Critical Care  Pager [phone]  Clinic [phone]

## 2021-06-10 NOTE — PROGRESS NOTES
Pt arrived for her yearly reclast infusion  Says no new problems recently. No oral pain or issues.No teeth. Normal creat result results from recent office visit. Reclast infused without problem. Line flushed and needle dc'd intact. 2x2 dressing with coban to site. Drank 12 oz while here and reminded to drink extra fluids today and tomorrow. AVS completed and verbally reviewed with patient due to her vision issues. Declined offer of information on reclast. Left per w/c with transport.

## 2021-06-11 NOTE — TELEPHONE ENCOUNTER
Refill Approved    Rx renewed per Medication Renewal Policy. Medication was last renewed on 8/27/19, last OV 8/4/20.    Jennifer Florentino, [facility] Triage/Med Refill 8/30/2020     Requested Prescriptions   Pending Prescriptions Disp Refills     FLUoxetine (PROZAC) 20 MG capsule [Pharmacy Med Name: FLUOXETINE 20 MG CAPSULE 20 CAP] 90 capsule 3     Sig: TAKE 1 CAPSULE BY MOUTH ONE TIME DAILY       SSRI Refill Protocol  Passed - 8/26/2020  8:42 AM        Passed - PCP or prescribing provider visit in last year     Last office visit with prescriber/PCP: 12/26/2019 Usman Gaines MD OR same dept: 12/26/2019 Usman Gaines MD OR same specialty: 12/26/2019 Usman Gaines MD  Last physical: Visit date not found Last MTM visit: Visit date not found   Next visit within 3 mo: Visit date not found  Next physical within 3 mo: Visit date not found  Prescriber OR PCP: Usman Gaines MD  Last diagnosis associated with med order: 1. Fatigue  - FLUoxetine (PROZAC) 20 MG capsule [Pharmacy Med Name: FLUOXETINE 20 MG CAPSULE 20 CAP]; TAKE 1 CAPSULE BY MOUTH ONE TIME DAILY  Dispense: 90 capsule; Refill: 3    If protocol passes may refill for 12 months if within 3 months of last provider visit (or a total of 15 months).

## 2021-06-11 NOTE — PROGRESS NOTES
[facility] Follow Up Note    Celeste VELÁZQUEZ Yong Peguero   77 y.o. female    Date of Visit: 2017    Chief Complaint   Patient presents with     Follow-up     B/P, no new issues     Subjective  Suzanna, she goes by her middle name, is here with her son, Mark.  Here for routine follow-up of hypertension.  Past history of anxiety but well controlled on Prozac.  Does occasionally use Lorazepam, she believes benefit outweighs risk, see previous discussion.  Is been no confusional spells or falls.    Her   in February.  She is doing well at [facility] with good support network.  She enjoys walking her dog every day.  Still occasional swelling.  Enjoys listening to books on tape.    She has severe macular degeneration, but functioning well.    No GI upset or nausea.  Eating regularly.  History of low sodium with anxiety.  On 2 sodium chloride tablets a day.  No edema.  Her graph her blood pressures been running in the 130s-140 over 70s.  No lightheaded dizzy spells.  On amlodipine 5 mg a day and lisinopril 40 mg a day.    No chest pain or palpitations.    COPD stable without increasing shortness of breath.  CT scan in  was negative.  Smoking in .    No chest pain.   cardiac CT scan with a score of 0 and carotid ultrasound negative for stenosis and no AAA.    She has moderate hypercholesterolemia with an LDL of 176, HDL 81 back in .  No plans for statin with her frail state with poor muscle tone in age 77.    She has osteoporosis.   bone density with femur score -3.5 and spine score -1.9.  Reclast started 2013, last dose 2017.  She is missing her teeth.  Vitamin D level was 33 last November.  In  international units a day vitamin D.    No GI upset, treated for H. pylori in .    Normal hemoglobin .  Normal bowel movements.  TSH normal in .  Her energy levels are good, and daily walking activity at the assisted living.    She uses  "cranberry capsules, no UTI symptoms.    PMHx:    Past Medical History:   Diagnosis Date     Acute H. pylori gastric ulcer 2010    Treated with no further Sx     Anxiety and depression     Stabilized on prozac 20mg qday.  Uses prn lorazepam.     Hiatal hernia      HTN (hypertension)      Hyponatremia     severe/ hospitalized when Tx for H. pylori/ ulcer.  No longer on HCTZ.     Macular degeneration     Wet with submacular hem R eye.  Avastin shots given x4 8/11 - 1/12.     Osteoporosis     4/13 DEXA S-1.9/F-3.5.  alendronate for two years, then teeth fell out.  Prolia x2 infections,then Reclast started 12/13.  No fracture Hx.       Shingles 2009    Thorasic, no post herpetic neuralgia.     PSHx:    Past Surgical History:   Procedure Laterality Date     TONSILECTOMY, ADENOIDECTOMY, BILATERAL MYRINGOTOMY AND TUBES      No other surgeries.       Immunizations:   Immunization History   Administered Date(s) Administered     Influenza high dose, seasonal 09/14/2015, 11/08/2016     Influenza, inj, historic 09/01/2014     Pneumo Conj 13-V (2010&after) 12/05/2014     Pneumo Polysac 23-V 03/10/2017       ROS A comprehensive review of systems was performed and was otherwise negative    Medications, allergies, and problem list were reviewed and updated    Exam  /74  Pulse 79  Ht 5' 4.5\" (1.638 m)  Wt 118 lb (53.5 kg)  SpO2 95%  BMI 19.94 kg/m2  Alert and oriented ×3.  Able to climb up on the exam table without difficulty.  No jaundice.  No thrush.  No oral lesions.  No teeth.  No cervical or supraclavicular adenopathy and no JVD.  Lungs with decreased breath sounds throughout consistent with his COPD but no wheezing or crackles.  Mild reduced respiratory excursion consistent with COPD.  Heart is regular without murmur.  No edema.  Abdomen soft and nontender.    Assessment/Plan  1. Essential hypertension with goal blood pressure less than 140/90  Controlled, continue amlodipine and lisinopril.  History of low " sodium.    CODE STATUS order placed, previous discussion.  - DNR (Do Not Resuscitate, Includes DNI)    2. Hypercholesterolemia  Patient does not wish to consider statins, no evidence of vascular disease and frail elderly woman, not planning statin.    3. Macular degeneration  Stable.  Managed by ophthalmology    4. Medication monitoring encounter      Osteoporosis  - Basic Metabolic Panel    Osteoporosis, plan was for 5 years of Reclast.  First dose was given January 2013 and this year's dose will last until spring of next year, that is a 5 year span.  At this point I am not planning further Reclast.  Continue regular ambulation and vitamin D supplement.    Anxiety well controlled.  Prozac.  Lorazepam as needed, see previous discussion from previous visits.  Getting good support with her grieving process after 's death in February.    Emphysema, no flare.  Up-to-date on shots.    Return in about 4 months (around 11/14/2017) for Recheck.   There are no Patient Instructions on file for this visit.  Usman Gaines MD      Current Outpatient Prescriptions   Medication Sig Dispense Refill     amLODIPine (NORVASC) 5 MG tablet TAKE ONE TABLET BY MOUTH ONE TIME DAILY 90 tablet 3     calcium carbonate (OS-NATALEE) 600 mg (1,500 mg) tablet Take 600 mg by mouth daily.        cholecalciferol, vitamin D3, (VITAMIN D3) 2,000 unit Tab Once a day 100 each 3     FLUoxetine (PROZAC) 20 MG capsule TAKE ONE CAPSULE BY MOUTH ONE TIME DAILY 90 capsule 3     ibuprofen (ADVIL,MOTRIN) 200 MG tablet Take 200 mg by mouth every 6 (six) hours as needed for pain (takes 2-3 tablets twice a day for joint aches).       lisinopril (PRINIVIL,ZESTRIL) 40 MG tablet TAKE ONE TABLET BY MOUTH ONE TIME DAILY 90 tablet 3     LORazepam (ATIVAN) 0.5 MG tablet TAKE ONE TABLET BY MOUTH TWICE DAILY AS NEEDED FOR ANXIETY. 30 tablet 2     SODIUM CL/POTASSIUM CHLORIDE (THERMOTABS ORAL) Take 2 tablets by mouth daily.        vitamin A-vitamin C-vit E-min (OCUVITE)  Tab tablet Take by mouth daily.       vitamin C-vitamin E (CRANBERRY CONCENTRATE) cap Take 2 tablets by mouth once daily.       No current facility-administered medications for this visit.      Allergies   Allergen Reactions     Sulfa (Sulfonamide Antibiotics) Diarrhea and Nausea Only     Also rash, throat swelling     Social History   Substance Use Topics     Smoking status: Former Smoker     Types: Cigarettes     Quit date: 12/5/2011     Smokeless tobacco: None     Alcohol use No

## 2021-06-12 NOTE — TELEPHONE ENCOUNTER
Refill Approved    Rx renewed per Medication Renewal Policy. Medication was last renewed on 8/27/19.    Valeria Chen, [facility] Triage/Med Refill 10/23/2020     Requested Prescriptions   Pending Prescriptions Disp Refills     lisinopriL (PRINIVIL,ZESTRIL) 40 MG tablet [Pharmacy Med Name: LISINOPRIL 40MG TABLET 40 MG Tablet] 90 tablet 3     Sig: TAKE 1 TABLET (40 MG TOTAL) BY MOUTH DAILY.       Ace Inhibitors Refill Protocol Passed - 10/22/2020 11:55 AM        Passed - PCP or prescribing provider visit in past 12 months       Last office visit with prescriber/PCP: 12/26/2019 Usman Gaines MD OR same dept: 12/26/2019 Usman Gaines MD OR same specialty: 12/26/2019 Usman Gaines MD  Last physical: Visit date not found Last MTM visit: Visit date not found   Next visit within 3 mo: Visit date not found  Next physical within 3 mo: Visit date not found  Prescriber OR PCP: Usman Gaines MD  Last diagnosis associated with med order: 1. HTN (hypertension)  - lisinopriL (PRINIVIL,ZESTRIL) 40 MG tablet [Pharmacy Med Name: LISINOPRIL 40MG TABLET 40 MG Tablet]; TAKE 1 TABLET (40 MG TOTAL) BY MOUTH DAILY.  Dispense: 90 tablet; Refill: 3    If protocol passes may refill for 12 months if within 3 months of last provider visit (or a total of 15 months).             Passed - Serum Potassium in past 12 months     Lab Results   Component Value Date    Potassium 4.6 08/03/2020             Passed - Blood pressure filed in past 12 months     BP Readings from Last 1 Encounters:   12/26/19 140/60             Passed - Serum Creatinine in past 12 months     Creatinine   Date Value Ref Range Status   08/03/2020 0.84 0.60 - 1.10 mg/dL Final

## 2021-06-13 NOTE — PROGRESS NOTES
"Celeste Peguero is a 80 y.o. female who is being evaluated via a billable video visit.      The patient has been notified of following:     \"This video visit will be conducted via a call between you and your physician/provider. We have found that certain health care needs can be provided without the need for an in-person physical exam.  This service lets us provide the care you need with a video conversation.  If a prescription is necessary we can send it directly to your pharmacy.  If lab work is needed we can place an order for that and you can then stop by our lab to have the test done at a later time.    Video visits are billed at different rates depending on your insurance coverage. Please reach out to your insurance provider with any questions.    If during the course of the call the physician/provider feels a video visit is not appropriate, you will not be charged for this service.\"    Patient has given verbal consent to a Video visit? Yes  How would you like to obtain your AVS? AVS Preference: MyChart.  If dropped by the video visit, the video invitation should be sent to: Text to cell phone: [phone] (Daughter-in-law, Catarina's mobile #)  Will anyone else be joining your video visit? No        Video Start Time: 1:48 PM    Additional provider notes:     [facility] Follow Up Note    Celeste Peguero   80 y.o. female    Date of Visit: 11/18/2020    Chief Complaint   Patient presents with     Follow-up     3 month checkup     Subjective  Suzanna requested a virtual visit during the coronavirus outbreak.    Following up on her medical issues of COPD and hypertension with history of hyponatremia.    Her chronic anxiety is fairly good.  She is focusing on caring for her 99-year-old mother also at [facility].    Patient is walking every day with cares with her mother and other activities.  Good exertional ability.  No falls.  No new weakness.    Anxiety controlled with Prozac 20 " mg a day and only rare lorazepam.  She was given 30 tablets in August.  She generally takes it a couple times a week.    She also takes Zofran as needed for nausea associated with her anxiety.    She does not feel like she has a low sodium.  She still on the sodium chloride 1 tablet 1 g daily.  Eating fairly well.    August 2020 labs reviewed with a sodium of 137.  Creatinine 0.8.    Hypertension has been controlled in the past but she has not checked it recently as her blood pressure cuff is not been working.  On lisinopril 40 mg a day and amlodipine 10 mg a day.    No edema.    Normal TSH and hemoglobin last December.    She is taking her vitamin D 2000 IU a day for vitamin D deficiency last year.    She did not want to do another DEXA scan or consider further treatment for osteoporosis, however in the past.    No flare of her COPD.  She has not smoked for a number of years.  No new cough.    2017 chest CT scan with a 2 mm left lower lobe nodule and some bronchiectasis but she is not wanted to repeat chest CT.    I did review the pulmonary note from August 2020.  No changes at that time and given a 1 year follow-up.  On Symbicort twice a day and as needed albuterol.    She has prednisone and Z-Pedro available but no recent rhonchi to spe.    Severe macular degeneration but stable and on Ocuvite.    No chest pain.        PMHx:    Past Medical History:   Diagnosis Date     Acute H. pylori gastric ulcer 2010    Treated with no further Sx     Anxiety and depression     Stabilized on prozac 20mg qday.  Uses prn lorazepam.     Hiatal hernia      HTN (hypertension)      Hyponatremia     severe/ hospitalized when Tx for H. pylori/ ulcer.  No longer on HCTZ.     Macular degeneration     Wet with submacular hem R eye.  Avastin shots given x4 8/11 - 1/12.     Osteoporosis     4/13 DEXA S-1.9/F-3.5.  alendronate for two years, then teeth fell out.  Prolia x2 infections,then Reclast started 12/13.  No fracture Hx.        Shingles 2009    Thorasic, no post herpetic neuralgia.     PSHx:    Past Surgical History:   Procedure Laterality Date     TONSILECTOMY, ADENOIDECTOMY, BILATERAL MYRINGOTOMY AND TUBES      No other surgeries.       Immunizations:   Immunization History   Administered Date(s) Administered     Influenza high dose,seasonal,PF, 65+ yrs 09/14/2015, 11/08/2016, 10/16/2017, 09/25/2018, 10/03/2019     Influenza, inj, historic,unspecified 09/01/2014     Pneumo Conj 13-V (2010&after) 12/05/2014     Pneumo Polysac 23-V 03/10/2017       ROS A comprehensive review of systems was performed and was otherwise negative    Medications, allergies, and problem list were reviewed and updated    Exam  There were no vitals taken for this visit.  Appears well on video.  Color is good, face appears well-nourished.  No dyspnea.    Assessment/Plan  1. Essential hypertension  Continue on amlodipine and lisinopril.  Has been controlled in the past.  She is planning to get a new blood pressure cuff.    Contact me if blood pressure running above 140/85.    She could stop the sodium chloride pill if increased blood pressure and her sodium is okay, but she has done better with the daily sodium chloride.     2. Pulmonary emphysema, unspecified emphysema type (H)   Stable.  Continue inhalers.  1 year follow-up in August with pulmonary medicine.    3. Anxiety  Controlled on Prozac.  Rare lorazepam.  No falls.    4. Nausea  Associated with anxiety.  She states she uses it about twice a month now.  Generally her diet is good without significant nausea.  - ondansetron (ZOFRAN ODT) 4 MG disintegrating tablet; Take 1 tablet (4 mg total) by mouth every 8 (eight) hours as needed for nausea.  Dispense: 30 tablet; Refill: 1    5. Vitamin D deficiency  2000 IU a day    6. Macular degeneration, unspecified laterality, unspecified type  Stable.  Managed by ophthalmology.    Her cats  Hardy and Ginger are doing well    Return in about 4 months (around 3/23/2021)  for Recheck.   Patient Instructions   No change in treatment plan.    Contact me if your blood pressure is running above 140/85    Usman Gaines MD        Current Outpatient Medications   Medication Sig Dispense Refill     albuterol (PROAIR HFA;PROVENTIL HFA;VENTOLIN HFA) 90 mcg/actuation inhaler Inhale 2 puffs every 6 (six) hours as needed for wheezing. 1 each 6     amLODIPine (NORVASC) 10 MG tablet Take 1 tablet (10 mg total) by mouth daily. 90 tablet 3     budesonide-formoterol (SYMBICORT) 160-4.5 mcg/actuation inhaler Inhale 2 puffs 2 (two) times a day. 1 Inhaler 12     calcium carbonate (OS-NATALEE) 600 mg (1,500 mg) tablet Take 600 mg by mouth daily.        cholecalciferol, vitamin D3, (VITAMIN D3) 2,000 unit Tab Once a day 100 each 3     FLUoxetine (PROZAC) 20 MG capsule TAKE 1 CAPSULE BY MOUTH ONE TIME DAILY 90 capsule 3     ibuprofen (ADVIL,MOTRIN) 200 MG tablet Take 200 mg by mouth every 6 (six) hours as needed for pain (takes 2-3 tablets twice a day for joint aches).       lisinopriL (PRINIVIL,ZESTRIL) 40 MG tablet TAKE 1 TABLET (40 MG TOTAL) BY MOUTH DAILY. 90 tablet 2     LORazepam (ATIVAN) 0.5 MG tablet TAKE 1 TABLET BY MOUTH TWICE A DAY AS NEEDED FOR ANXIETY. 30 tablet 0     ondansetron (ZOFRAN ODT) 4 MG disintegrating tablet Take 1 tablet (4 mg total) by mouth every 8 (eight) hours as needed for nausea. 30 tablet 1     sodium chloride 1 gram tablet Take 1 tablet (1 g total) by mouth daily.  0     vitamin A-vitamin C-vit E-min (OCUVITE) Tab tablet Take by mouth daily.       vitamin C-vitamin E (CRANBERRY CONCENTRATE) cap Take 2 tablets by mouth once daily.       azithromycin (ZITHROMAX) 250 MG tablet Take two pills the first day and then one pill a day for four more days.  Take with prednisone prescription, if respiratory infection 6 tablet 0     predniSONE (DELTASONE) 20 MG tablet Take 2 tablets (40 mg) on the first day and 1 tablet (20 mg) until gone. Taken with azithromycin.. 6 tablet 0     No current  facility-administered medications for this visit.      Allergies   Allergen Reactions     Sulfa (Sulfonamide Antibiotics) Diarrhea and Nausea Only     Also rash, throat swelling     Incruse Ellipta [Umeclidinium] Angioedema     Mouth and tongue swelling     Social History     Tobacco Use     Smoking status: Former Smoker     Types: Cigarettes     Quit date: 2010     Years since quittin.9     Smokeless tobacco: Never Used   Substance Use Topics     Alcohol use: No     Drug use: No           Video-Visit Details    Type of service:  Video Visit    Video End Time (time video stopped): 1:56 PM  Originating Location (pt. Location): Home    Distant Location (provider location):  [facility]     Platform used for Video Visit: Saroj Gaines MD
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
## 2021-06-14 NOTE — TELEPHONE ENCOUNTER
Controlled Substance Refill Request  Medication Name:   Requested Prescriptions     Pending Prescriptions Disp Refills     LORazepam (ATIVAN) 0.5 MG tablet [Pharmacy Med Name: LORAZEPAM 0.5 MG TAB 0.5 Tablet] 30 tablet 0     Sig: TAKE 1 TABLET BY MOUTH TWICE A DAY AS NEEDED FOR ANXIETY.     predniSONE (DELTASONE) 20 MG tablet [Pharmacy Med Name: PREDNISONE 20MG TABLET 20 MG Tablet] 6 tablet 0     Sig: TAKE 2 TABLETS (40 MG) ON THE FIRST DAY AND 1 TABLET (20 MG) UNTIL GONE. TAKEN WITH AZITHROMYCIN.     Date Last Fill: 8/6/20  [facility]ed [facility]acy: [facility]  Submit electronically to pharmacy  Controlled Substance Agreement on file:   Encounter-Level CSA Scan Date:    There are no encounter-level csa scan date.        Last office visit:  11/18/20      Provider Refill Request  Medication:  prednisone  RN can NOT refill this medication per RN refill protocol because med is not covered by policy/route to provider

## 2021-06-14 NOTE — PROGRESS NOTES
Lung Cancer Screening pre-scan counseling Visit    The patient fits the risk profile of patients who benefit from this screening:  -The patient is >55 years old and <80 years old  -The patient has  pack year history (over 30)  -The patient has smoked within the past 15 years--quit in 2010 per patient  -The patient has no medical comorbidity severe enough that it would cause mortality prior to mortality due to the lung cancer attempting to be detected.    Discussion with patient regarding the harms associated with LDCT screening include false-negative and false-positive results, incidental findings, overdiagnosis, and radiation exposure were reviewed at length.   The patient understands that pursuing this screening test may result in a biopsy that was not necessary. It may also produced added stress over a nodule that is likely not cancer.    Of 100 patients who get screening, 25 will have a positive scan. Of those 25, only 1 will have cancer.  Overdiagnosis is estimated at 10% of patients-- they would not have been detected in the patient's lifetime without screening. Less than 1% of patients likely had death related to radiation exposure increase.   Average low-dose CT associated with 0.61 to 1.5 mSv. Annual background radiation exposure in the [place] averages 2.4 mS; mammogram is 0.7mSv.    The benefits are reduction in risk of death from lung cancer. The number needed to treat is 320 (for every 320 patients who undergo screening, 1 patient will have a benefit in mortality from early detection from the screening).    Undergoing this screening implies willingness to pursue further potentially invasive testing to discover potential cancer.    All questions were answered.    The patient s results will be followed in our pulmonary registry and will be recalled based on the findings of the CT scan.    *ADDENDUM: Reviewed CT scan. This showed severe emphysema with some RML bronchiectasis. No concerning nodules.  Plan to repeat CT scan in 1 year and continue screening until patient is 80 years old (three more years).

## 2021-06-14 NOTE — PROGRESS NOTES
[facility] Follow Up Note    Celeste Peguero   77 y.o. female    Date of Visit: 11/13/2017    Chief Complaint   Patient presents with     Follow-up     mouth and tongue swelling with Incruse Ellipta     Subjective  Suzanna is here to follow-up on her COPD/emphysema.  She could not afford the Spiriva inhaler.  The Incruse Ellipta inhaler was covered, she used it 7 times, but then had significant mouth and tongue swelling 1 evening after using it.  Just lasted one evening and resolved.  Tongue is normal and no swallowing difficulty now.  He had a similar event happened with a sulfa drug in the past.    Patient has been on lisinopril for many years without mild swelling or angioedema.    Moderate emphysema, quit smoking 2011.  She has some moderate dyspnea on exertion with occasional wheezing but no major exacerbation.  Does have a 5 day course of prednisone but has not used it.    No chest pain and no right clavicular pain, that resolved.    She just recently had a chest CT scan for lung cancer screening that was negative, one-year follow-up recommended.  She did meet with Yocasta Xiong pulmonologist after that lung CT scan screen and had a good experience with that pulmonologist and would be interested in seeing a pulmonologist at this time.    Hypertension well controlled, denies lightheaded dizzy spells and no edema.    Anxiety controlled with Prozac and occasional lorazepam.    PMHx:    Past Medical History:   Diagnosis Date     Acute H. pylori gastric ulcer 2010    Treated with no further Sx     Anxiety and depression     Stabilized on prozac 20mg qday.  Uses prn lorazepam.     Hiatal hernia      HTN (hypertension)      Hyponatremia     severe/ hospitalized when Tx for H. pylori/ ulcer.  No longer on HCTZ.     Macular degeneration     Wet with submacular hem R eye.  Avastin shots given x4 8/11 - 1/12.     Osteoporosis     4/13 DEXA S-1.9/F-3.5.  alendronate for two years, then teeth fell  "out.  Prolia x2 infections,then Reclast started 12/13.  No fracture Hx.       Shingles 2009    Thorasic, no post herpetic neuralgia.     PSHx:    Past Surgical History:   Procedure Laterality Date     TONSILECTOMY, ADENOIDECTOMY, BILATERAL MYRINGOTOMY AND TUBES      No other surgeries.       Immunizations:   Immunization History   Administered Date(s) Administered     Influenza high dose, seasonal 09/14/2015, 11/08/2016, 10/16/2017     Influenza, inj, historic,unspecified 09/01/2014     Pneumo Conj 13-V (2010&after) 12/05/2014     Pneumo Polysac 23-V 03/10/2017       ROS A comprehensive review of systems was performed and was otherwise negative    Medications, allergies, and problem list were reviewed and updated    Exam  /68  Pulse 65  Ht 5' 4.5\" (1.638 m)  Wt 116 lb (52.6 kg)  SpO2 93%  BMI 19.6 kg/m2  Lungs with decreased breath sounds throughout.  Tongue is normal.  Pharynx appears normal.  Heart is regular no murmur.  No edema.    Assessment/Plan  1. Pulmonary emphysema, unspecified emphysema type  Stable with moderate symptoms of dyspnea on exertion.  Patient did request an albuterol inhaler as needed, which was given, she was instructed to keep with her pharmacist about technique, I talked to the daughter-in-law and told her to contact clinic if difficulty giving that.    We will referral to pulmonary medicine to evaluate for a longer acting maintenance treatment for emphysema, in light of inability to afford the Spiriva and the mild swelling from the last inhaler, as above.  - Ambulatory referral to Pulmonology    2. Essential hypertension  Controlled, continue current medications, routine four-month follow-up with me.  Other issues outlined in October 16, 2017 note.    The clavicular pain issue, resolved.    Return in about 4 months (around 3/13/2018) for Recheck.   Patient Instructions   I am referring you to the pulmonary physician clinic for recommendations on the best inhaler for you, in " light of the mouth swelling with your last inhaler and the cost of the Spiriva inhaler.    Routine 4 month follow-up with me.    Usman Gaines MD      Current Outpatient Prescriptions   Medication Sig Dispense Refill     amLODIPine (NORVASC) 5 MG tablet TAKE ONE TABLET BY MOUTH ONE TIME DAILY 90 tablet 3     calcium carbonate (OS-NATALEE) 600 mg (1,500 mg) tablet Take 600 mg by mouth daily.        cholecalciferol, vitamin D3, (VITAMIN D3) 2,000 unit Tab Once a day 100 each 3     FLUoxetine (PROZAC) 20 MG capsule TAKE ONE CAPSULE BY MOUTH ONE TIME DAILY 90 capsule 3     ibuprofen (ADVIL,MOTRIN) 200 MG tablet Take 200 mg by mouth every 6 (six) hours as needed for pain (takes 2-3 tablets twice a day for joint aches).       lisinopril (PRINIVIL,ZESTRIL) 40 MG tablet TAKE ONE TABLET BY MOUTH ONE TIME DAILY 90 tablet 3     LORazepam (ATIVAN) 0.5 MG tablet TAKE ONE TABLET BY MOUTH TWICE DAILY AS NEEDED FOR ANXIETY. 30 tablet 2     SODIUM CL/POTASSIUM CHLORIDE (THERMOTABS ORAL) Take 2 tablets by mouth daily.        vitamin A-vitamin C-vit E-min (OCUVITE) Tab tablet Take by mouth daily.       vitamin C-vitamin E (CRANBERRY CONCENTRATE) cap Take 2 tablets by mouth once daily.       predniSONE (DELTASONE) 20 MG tablet Take 1 tablet (20 mg total) by mouth daily. Take one tablet a day for 5 days. 5 tablet 0     tiotropium (SPIRIVA WITH HANDIHALER) 18 mcg inhalation capsule Place 1 capsule into the inhaler device. Close and press button to crush the capsule then inhale in two breaths 30 capsule 5     No current facility-administered medications for this visit.      Allergies   Allergen Reactions     Sulfa (Sulfonamide Antibiotics) Diarrhea and Nausea Only     Also rash, throat swelling     Incruse Ellipta [Umeclidinium] Angiodema     Social History   Substance Use Topics     Smoking status: Former Smoker     Types: Cigarettes     Quit date: 12/5/2010     Smokeless tobacco: None     Alcohol use No

## 2021-06-15 NOTE — PROGRESS NOTES
Pulmonary Consult Note  12/29/2017    Referring Physician: Dr. Gaines  Reason for Consult: COPD      Problem List:     Patient Active Problem List   Diagnosis     Macular degeneration     Osteoporosis     HTN (hypertension)     Hypercholesterolemia     Medication monitoring encounter     Vitamin D deficiency     Essential hypertension     Pulmonary emphysema, unspecified emphysema type     Nicotine dependence in remission           History:     Mrs. Celeste Peguero is a 78 yo female wo presents to pulmonary clinic for evaluation of emphysema and likely COPD.  She originally underwent a lung cancer screening CT and was noted to have significant emphysema.  She was then referred here.  On review she does note that she has STORY, but this is mostly with extreme exertion.  She doesn't really note any significant wheezing or chest tightness.  She does note that high humidity and extreme cold temperatures make her breathing worse.  She does have a >30 pk/yr smoking history, but quit in 2010.  She has not had any history of exacerbations or any history of use of prednisone or antibiotics.  She does get a yearly influenza vaccination.    She was started on Incruse by Dr. Gaines as spririva was not on her formulary and she developed a severe anaphylactoid reaction with lip and tongue swelling.      Past Medical History:   Diagnosis Date     Acute H. pylori gastric ulcer 2010    Treated with no further Sx     Anxiety and depression     Stabilized on prozac 20mg qday.  Uses prn lorazepam.     Hiatal hernia      HTN (hypertension)      Hyponatremia     severe/ hospitalized when Tx for H. pylori/ ulcer.  No longer on HCTZ.     Macular degeneration     Wet with submacular hem R eye.  Avastin shots given x4 8/11 - 1/12.     Osteoporosis     4/13 DEXA S-1.9/F-3.5.  alendronate for two years, then teeth fell out.  Prolia x2 infections,then Reclast started 12/13.  No fracture Hx.       Shingles 2009    Thorasic, no post herpetic  neuralgia.     Past Surgical History:   Procedure Laterality Date     TONSILECTOMY, ADENOIDECTOMY, BILATERAL MYRINGOTOMY AND TUBES      No other surgeries.       Social History     Social History     Marital status:      Spouse name: N/A     Number of children: N/A     Years of education: N/A     Occupational History     Not on file.     Social History Main Topics     Smoking status: Former Smoker     Types: Cigarettes     Quit date: 12/5/2010     Smokeless tobacco: Not on file     Alcohol use No     Drug use: No     Sexual activity: No     Other Topics Concern     Not on file     Social History Narrative    Lives at [facility] on [address] [facility]. [place].  Has Spaniel  dog.   with COPD.     Family History   Problem Relation Age of Onset     Stroke Maternal Grandmother      Allergies   Allergen Reactions     Sulfa (Sulfonamide Antibiotics) Diarrhea and Nausea Only     Also rash, throat swelling     Incruse Ellipta [Umeclidinium] Angiodema     Mouth and tongue swelling     She previously worked as a PT.    Review of Systems - 10 point review of systems negative except what is mentioned in the HPI.        Medications:     Current Outpatient Prescriptions on File Prior to Visit   Medication Sig Dispense Refill     albuterol (PROAIR HFA;PROVENTIL HFA;VENTOLIN HFA) 90 mcg/actuation inhaler Inhale 2 puffs every 6 (six) hours as needed for wheezing. 1 each 4     amLODIPine (NORVASC) 5 MG tablet TAKE ONE TABLET BY MOUTH ONE TIME DAILY 90 tablet 3     calcium carbonate (OS-NATALEE) 600 mg (1,500 mg) tablet Take 600 mg by mouth daily.        cholecalciferol, vitamin D3, (VITAMIN D3) 2,000 unit Tab Once a day 100 each 3     FLUoxetine (PROZAC) 20 MG capsule TAKE ONE CAPSULE BY MOUTH ONE TIME DAILY 90 capsule 3     ibuprofen (ADVIL,MOTRIN) 200 MG tablet Take 200 mg by mouth every 6 (six) hours as needed for pain (takes 2-3 tablets twice a day for joint aches).       lisinopril (PRINIVIL,ZESTRIL) 40 MG tablet TAKE ONE  TABLET BY MOUTH ONE TIME DAILY 90 tablet 3     LORazepam (ATIVAN) 0.5 MG tablet TAKE ONE TABLET BY MOUTH TWICE DAILY AS NEEDED FOR ANXIETY. 30 tablet 2     predniSONE (DELTASONE) 20 MG tablet Take 1 tablet (20 mg total) by mouth daily. Take one tablet a day for 5 days. 5 tablet 0     SODIUM CL/POTASSIUM CHLORIDE (THERMOTABS ORAL) Take 2 tablets by mouth daily.        vitamin A-vitamin C-vit E-min (OCUVITE) Tab tablet Take by mouth daily.       vitamin C-vitamin E (CRANBERRY CONCENTRATE) cap Take 2 tablets by mouth once daily.       tiotropium (SPIRIVA WITH HANDIHALER) 18 mcg inhalation capsule Place 1 capsule into the inhaler device. Close and press button to crush the capsule then inhale in two breaths 30 capsule 5     No current facility-administered medications on file prior to visit.            Exam/Data:   Vitals  Vitals:    12/29/17 1357   BP: 130/68   Pulse: 84   Resp: 24   SpO2: 94%       EXAM:  GEN: Alert and oriented x3, NAD  HEENT: Nares patent, posterior oropharynx clear.  RESPIRATORY: CTAB but with greatly diminished breath sounds.  No wheeze or rales  CARDIOVASCULAR: RRR, no m/r/g  GASTROINTESTINAL: Round, soft, NT/ND  HEM/ONC: no adenopathy, no ecchymosis  MSK: no clubbing.  Ambulates normally  NEURO: cranial nerves appear grossly intact, muscle strength equal  SKIN: no rash or ulcerations      DATA      PFT DATA:  SPIROMETRY  FVC     1.54L   (57%)  FEV1   0.67L   (32%)  Ratio   44    Post BD there is a significant bronchodilator response    LUNG VOLUMES  TLC    11.82L  (235%)  RV  9.95L   (450%)     DIFFUSION  DLCOc 4.43 mL/min/mmHg   (22%)    OVERALL IMPRESSION  There is very severe obstructive ventilatory defect.  There is a significant bronchodilator effect.  There is significant air-trapping and hyperinflation.  There is severe reduction in diffusing capacity when corrected for hemoglobin.    IMAGING:   There is significant emphysematous changes on CT.  Personally reviewed.     [facility]  [facility]  LOW DOSE LUNG CANCER SCREENING CT CHEST  11/9/2017 10:03 AM     INDICATION: Lung cancer screening. 30 pack year smoking history and nicotine dependence. Increased shortness of breath.  TECHNIQUE: Low-dose lung cancer screening noncontrast CT chest. Dose reduction techniques were used.   COMPARISON: Limited CT chest 1/13/2015     FINDINGS:  LUNGS AND PLEURA: Moderate to severe centrilobular emphysema. Right middle lobe subtle interval bronchiectasis with fibroatelectasis. A few additional scattered linear parenchymal bands of subsegmental atelectasis/fibrosis. Stable benign 2 mm   pleural-based nodular opacity left lower lobe image 216.     MEDIASTINUM: No adenopathy. No pleural or pericardial effusion.     CORONARY ARTERY CALCIFICATION: Mild.     LIMITED UPPER ABDOMEN: Probable benign hepatic cysts.     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     IMPRESSION:   CONCLUSION:  1.  Stable benign 2 mm left lower lobe nodule with no additional suspicious nodules.  2.  Moderate to severe centrilobular emphysema.  3.  Mild right middle lobe bronchiectasis with associated mild fibroatelectasis redemonstrated.     RADIOLOGIST RECOMMENDATION: Recommend annual low-dose lung cancer screening CT if clinically appropriate.     LungRADS CATEGORY: 1: Negative.     SIGNIFICANT INCIDENTAL FINDINGS: Moderate to severe centrilobular emphysema.    Assessment/Plan:   Celeste Peguero is a 77 y.o. female with PMH significant HTN, tobacco use, and emphysema with COPD.    1. COPD, GOLD class B, stage IV:  Her COPD is very stable, and she has not had any previous exacerbations.  She does have exertional dyspnea.  She had a significant allergic reaction reaction to Incruse.  I would be hesitant to try her on spiriva as it may be a class effect.  I would also be hesitant to use Breo as it may be a carrier molecule.  I will try her on Symbicort 160 mcg, 2 puffs BID.  She has already quit smoking.  She  is up to date on her vaccines.  I did speak to her about pulmonary rehab, and she is wanting to try inhalers first.      2. Pulmonary nodule:  This is very small.  Given her history, we will follow this up in 1 year.  I don't think she would tolerate any surgical procedures.  I will follow this for 1 year, and if stable, no further CT scans for lung cancer screening.    Recommend:  - trial symbicort 2 puffs BID  - RTC in 4-6 weeks      Nikos Finnegan,

## 2021-06-15 NOTE — TELEPHONE ENCOUNTER
RN cannot approve Refill Request    RN can NOT refill this medication PCP messaged that patient is overdue for BP Check. Last office visit: 12/26/2019 Usman Gaines MD Last Physical: Visit date not found Last MTM visit: Visit date not found Last visit same specialty: 12/26/2019 Usman Gaines MD.  Next visit within 3 mo: Visit date not found  Next physical within 3 mo: Visit date not found      Arlene Potts, [facility] Triage/Med Refill 2/25/2021    Requested Prescriptions   Pending Prescriptions Disp Refills     amLODIPine (NORVASC) 10 MG tablet [Pharmacy Med Name: AMLODIPINE 10 MG TAB 10 Tablet] 90 tablet 3     Sig: TAKE 1 TABLET (10 MG TOTAL) BY MOUTH DAILY.       Calcium-Channel Blockers Protocol Failed - 2/25/2021  8:17 AM        Failed - Blood pressure filed in past 12 months     BP Readings from Last 1 Encounters:   12/26/19 140/60             Passed - PCP or prescribing provider visit in past 12 months or next 3 months     Last office visit with prescriber/PCP: 12/26/2019 Usman Gaines MD OR same dept: Visit date not found OR same specialty: 12/26/2019 Usman Gaines MD  Last physical: Visit date not found Last MTM visit: Visit date not found   Next visit within 3 mo: Visit date not found  Next physical within 3 mo: Visit date not found  Prescriber OR PCP: Usman Gaines MD  Last diagnosis associated with med order: 1. HTN (hypertension)  - amLODIPine (NORVASC) 10 MG tablet [Pharmacy Med Name: AMLODIPINE 10 MG TAB 10 Tablet]; Take 1 tablet (10 mg total) by mouth daily.  Dispense: 90 tablet; Refill: 3    If protocol passes may refill for 12 months if within 3 months of last provider visit (or a total of 15 months).

## 2021-06-16 PROBLEM — J43.9 PULMONARY EMPHYSEMA, UNSPECIFIED EMPHYSEMA TYPE (H): Status: ACTIVE | Noted: 2017-10-16

## 2021-06-16 PROBLEM — F17.201 NICOTINE DEPENDENCE IN REMISSION: Status: ACTIVE | Noted: 2017-11-09

## 2021-06-16 PROBLEM — F41.9 ANXIETY: Status: ACTIVE | Noted: 2018-03-09

## 2021-06-16 NOTE — TELEPHONE ENCOUNTER
Have her change the visit to a virtual-video visit.  Have her check her blood pressure before the visit.

## 2021-06-16 NOTE — PATIENT INSTRUCTIONS - HE
No change in treatment plan.    Check blood pressure with goal blood pressure less than 140/85.    Contact me if any concerns, otherwise I will see you for your physical exam on June 23 at 1 PM, do not fast for that appointment.

## 2021-06-16 NOTE — PROGRESS NOTES
Pulmonary Follow Up Note  2/26/2018    Referring Physician: Dr. Gaines  Reason for Follow Up: COPD      Problem List:     Patient Active Problem List   Diagnosis     Macular degeneration     Osteoporosis     HTN (hypertension)     Hypercholesterolemia     Medication monitoring encounter     Vitamin D deficiency     Essential hypertension     Pulmonary emphysema, unspecified emphysema type     Nicotine dependence in remission           History:     Mrs. Celeste Peguero is a 76 yo female wo presents to pulmonary clinic for evaluation of emphysema and likely COPD.  She originally underwent a lung cancer screening CT and was noted to have significant emphysema.  She was then referred here.  On review she does note that she has STORY, but this is mostly with extreme exertion.  She doesn't really note any significant wheezing or chest tightness.  She does note that high humidity and extreme cold temperatures make her breathing worse.  She does have a >30 pk/yr smoking history, but quit in 2010.  She has not had any history of exacerbations or any history of use of prednisone or antibiotics.  She does get a yearly influenza vaccination.    She was started on Incruse by Dr. Gaines as spririva was not on her formulary and she developed a severe anaphylactoid reaction with lip and tongue swelling.      At her last visit we started her on Symbicort.  She feels that this has really helped her, as her energy and breathing are improved.  She denies any cough, chest tightness, wheezing.  She denies any voice hoarsness or thrush.  She is not using the Incruse any more.    Past Medical History:   Diagnosis Date     Acute H. pylori gastric ulcer 2010    Treated with no further Sx     Anxiety and depression     Stabilized on prozac 20mg qday.  Uses prn lorazepam.     Hiatal hernia      HTN (hypertension)      Hyponatremia     severe/ hospitalized when Tx for H. pylori/ ulcer.  No longer on HCTZ.     Macular degeneration     Wet with  submacular hem R eye.  Avastin shots given x4 8/11 - 1/12.     Osteoporosis     4/13 DEXA S-1.9/F-3.5.  alendronate for two years, then teeth fell out.  Prolia x2 infections,then Reclast started 12/13.  No fracture Hx.       Shingles 2009    Thorasic, no post herpetic neuralgia.     Past Surgical History:   Procedure Laterality Date     TONSILECTOMY, ADENOIDECTOMY, BILATERAL MYRINGOTOMY AND TUBES      No other surgeries.       Social History     Social History     Marital status:      Spouse name: N/A     Number of children: N/A     Years of education: N/A     Occupational History     Not on file.     Social History Main Topics     Smoking status: Former Smoker     Types: Cigarettes     Quit date: 12/5/2010     Smokeless tobacco: Not on file     Alcohol use No     Drug use: No     Sexual activity: No     Other Topics Concern     Not on file     Social History Narrative    Lives at [facility] on [address]. [place].  Has Spaniel  dog.   with COPD.     Family History   Problem Relation Age of Onset     Stroke Maternal Grandmother      Allergies   Allergen Reactions     Sulfa (Sulfonamide Antibiotics) Diarrhea and Nausea Only     Also rash, throat swelling     Incruse Ellipta [Umeclidinium] Angiodema     Mouth and tongue swelling     She previously worked as a PT.    Review of Systems - 10 point review of systems negative except what is mentioned in the HPI.        Medications:     Current Outpatient Prescriptions on File Prior to Visit   Medication Sig Dispense Refill     albuterol (PROAIR HFA;PROVENTIL HFA;VENTOLIN HFA) 90 mcg/actuation inhaler Inhale 2 puffs every 6 (six) hours as needed for wheezing. 1 each 4     amLODIPine (NORVASC) 5 MG tablet TAKE ONE TABLET BY MOUTH ONE TIME DAILY 90 tablet 3     budesonide-formoterol (SYMBICORT) 160-4.5 mcg/actuation inhaler Inhale 2 puffs 2 (two) times a day. 1 Inhaler 11     calcium carbonate (OS-NATALEE) 600 mg (1,500 mg) tablet Take 600 mg by mouth daily.         cholecalciferol, vitamin D3, (VITAMIN D3) 2,000 unit Tab Once a day 100 each 3     FLUoxetine (PROZAC) 20 MG capsule TAKE ONE CAPSULE BY MOUTH ONE TIME DAILY 90 capsule 3     ibuprofen (ADVIL,MOTRIN) 200 MG tablet Take 200 mg by mouth every 6 (six) hours as needed for pain (takes 2-3 tablets twice a day for joint aches).       lisinopril (PRINIVIL,ZESTRIL) 40 MG tablet TAKE ONE TABLET BY MOUTH ONE TIME DAILY 90 tablet 3     LORazepam (ATIVAN) 0.5 MG tablet TAKE ONE TABLET BY MOUTH TWICE DAILY AS NEEDED FOR ANXIETY. 30 tablet 2     predniSONE (DELTASONE) 20 MG tablet Take 1 tablet (20 mg total) by mouth daily. Take one tablet a day for 5 days. 5 tablet 0     SODIUM CL/POTASSIUM CHLORIDE (THERMOTABS ORAL) Take 2 tablets by mouth daily.        tiotropium (SPIRIVA WITH HANDIHALER) 18 mcg inhalation capsule Place 1 capsule into the inhaler device. Close and press button to crush the capsule then inhale in two breaths 30 capsule 5     vitamin A-vitamin C-vit E-min (OCUVITE) Tab tablet Take by mouth daily.       vitamin C-vitamin E (CRANBERRY CONCENTRATE) cap Take 2 tablets by mouth once daily.       No current facility-administered medications on file prior to visit.            Exam/Data:   Vitals  Vitals:    02/26/18 1358   BP: 136/68   Pulse: 61   Resp: 18   SpO2: 92%       EXAM:  GEN: Alert and oriented x3, NAD  HEENT: Nares patent, posterior oropharynx clear.  RESPIRATORY: CTAB but with greatly diminished breath sounds.  No wheeze or rales  CARDIOVASCULAR: RRR, no m/r/g  GASTROINTESTINAL: Round, soft, NT/ND  HEM/ONC: no adenopathy, no ecchymosis  MSK: no clubbing.  Ambulates normally  NEURO: cranial nerves appear grossly intact, muscle strength equal  SKIN: no rash or ulcerations      DATA      PFT DATA:  SPIROMETRY  FVC     1.54L   (57%)  FEV1   0.67L   (32%)  Ratio   44    Post BD there is a significant bronchodilator response    LUNG VOLUMES  TLC    11.82L  (235%)  RV  9.95L   (450%)     DIFFUSION  DLCOc 4.43  mL/min/mmHg   (22%)    OVERALL IMPRESSION  There is very severe obstructive ventilatory defect.  There is a significant bronchodilator effect.  There is significant air-trapping and hyperinflation.  There is severe reduction in diffusing capacity when corrected for hemoglobin.    IMAGING:   There is significant emphysematous changes on CT.  Personally reviewed.     [facility]  LOW DOSE LUNG CANCER SCREENING CT CHEST  11/9/2017 10:03 AM     INDICATION: Lung cancer screening. 30 pack year smoking history and nicotine dependence. Increased shortness of breath.  TECHNIQUE: Low-dose lung cancer screening noncontrast CT chest. Dose reduction techniques were used.   COMPARISON: Limited CT chest 1/13/2015     FINDINGS:  LUNGS AND PLEURA: Moderate to severe centrilobular emphysema. Right middle lobe subtle interval bronchiectasis with fibroatelectasis. A few additional scattered linear parenchymal bands of subsegmental atelectasis/fibrosis. Stable benign 2 mm   pleural-based nodular opacity left lower lobe image 216.     MEDIASTINUM: No adenopathy. No pleural or pericardial effusion.     CORONARY ARTERY CALCIFICATION: Mild.     LIMITED UPPER ABDOMEN: Probable benign hepatic cysts.     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     IMPRESSION:   CONCLUSION:  1.  Stable benign 2 mm left lower lobe nodule with no additional suspicious nodules.  2.  Moderate to severe centrilobular emphysema.  3.  Mild right middle lobe bronchiectasis with associated mild fibroatelectasis redemonstrated.     RADIOLOGIST RECOMMENDATION: Recommend annual low-dose lung cancer screening CT if clinically appropriate.     LungRADS CATEGORY: 1: Negative.     SIGNIFICANT INCIDENTAL FINDINGS: Moderate to severe centrilobular emphysema.    Assessment/Plan:   Celeste Peguero is a 77 y.o. female with PMH significant HTN, tobacco use, and emphysema with COPD.    1. COPD, GOLD class B, stage IV:  Her COPD is very  stable, and she has not had any previous exacerbations.  She does have exertional dyspnea.  She had a significant allergic reaction reaction to Incruse.  I would be hesitant to try her on spiriva as it may be a class effect.  I would also be hesitant to use Breo as it may be a carrier molecule.  I put her on Symbicort 160 mcg, 2 puffs BID and she feels quite well.  She has already quit smoking.  She is up to date on her vaccines.  I did speak to her about pulmonary rehab, and she is wanting to try inhalers first.  She would like to go to pulmonary rehab, but would not have a steady ride for transportation.  I did refer her to the [employer] website, [email or web address].org, and she will review this with her son.      2. Pulmonary nodule:  This is very small.  Given her history, we will follow this up in 1 year.  I don't think she would tolerate any surgical procedures.  I will follow this for 1 year, and if stable, no further CT scans for lung cancer screening.    Recommend:  - symbicort 2 puffs BID  - 6 months RTC      Nikos Finnegan,

## 2021-06-16 NOTE — PROGRESS NOTES
[facility] Follow Up Note    Celeste A Yong Peguero   77 y.o. female    Date of Visit: 3/9/2018    Chief Complaint   Patient presents with     Follow-up     Subjective  Suzanna is here for follow-up on hypertension and COPD.    He quit smoking 2011.  Has severe COPD.  Recently saw pulmonary medicine.  She is now on Symbicort 2 puffs twice a day which she feels gives her significant benefit.  Recent PFTs showed an FEV1 of 0.67 with 32%.  Significant air trapping.  There was significant bronchodilator effect.    She recently had a cold with increased cough and wheezing.  She took a 5 day course of prednisone 20 mg which gave her significant benefit.  She is almost back to baseline just minimal cough.  No fever.    She is ambulate in the hallways at her assisted living on a daily basis.    No chest pain or palpitations or lower extremity edema.    She had lip and tongue swelling from Incruse ellipta inhaler, no longer uses the Spiriva because of fear of cross reaction.  She is not currently using albuterol inhaler.    No tremor or history of tachyarrhythmia.    Chest CT scan last November, low-dose screening showed a stable 2 mm left lower lobe nodule and no other sebaceous lesions.  Moderate to severe emphysema.  Some right middle lobe fibroatelectasis.  Pulmonary medicine did review the CT with her.  Plans repeat in 1 year and then no further stable.    Her hypertension has been controlled on home checks in the 130s over 70s.  No lightheaded dizzy spells.  She gets occasional high spikes in blood pressure and takes an extra 5 mg of amlodipine rarely.    On lisinopril 40 mg a day and amlodipine 5 mg a day normally.    Medical degeneration stable and sees her ophthalmologist regularly.    No GI symptoms or abdominal pain or diarrhea.  No urinary symptoms    Anxiety controlled with Prozac and just rare Lorazepam.  Last lorazepam was this past Monday.  No falls.    PMHx:    Past Medical History:  "  Diagnosis Date     Acute H. pylori gastric ulcer 2010    Treated with no further Sx     Anxiety and depression     Stabilized on prozac 20mg qday.  Uses prn lorazepam.     Hiatal hernia      HTN (hypertension)      Hyponatremia     severe/ hospitalized when Tx for H. pylori/ ulcer.  No longer on HCTZ.     Macular degeneration     Wet with submacular hem R eye.  Avastin shots given x4 8/11 - 1/12.     Osteoporosis     4/13 DEXA S-1.9/F-3.5.  alendronate for two years, then teeth fell out.  Prolia x2 infections,then Reclast started 12/13.  No fracture Hx.       Shingles 2009    Thorasic, no post herpetic neuralgia.     PSHx:    Past Surgical History:   Procedure Laterality Date     TONSILECTOMY, ADENOIDECTOMY, BILATERAL MYRINGOTOMY AND TUBES      No other surgeries.       Immunizations:   Immunization History   Administered Date(s) Administered     Influenza high dose, seasonal 09/14/2015, 11/08/2016, 10/16/2017     Influenza, inj, historic,unspecified 09/01/2014     Pneumo Conj 13-V (2010&after) 12/05/2014     Pneumo Polysac 23-V 03/10/2017       ROS A comprehensive review of systems was performed and was otherwise negative    Medications, allergies, and problem list were reviewed and updated    Exam  /80  Pulse 69  Ht 5' 4.5\" (1.638 m)  Wt 115 lb (52.2 kg)  SpO2 94%  BMI 19.43 kg/m2  Thin female.  Good mood and affect.  Clamp on the exam table.  Decreased breath sounds throughout with reduced respiratory excursion but baseline and no wheezing or crackles.  No rhonchi.  No thrush.  No oral lesions.  No JVD.  Heart is regular with no murmur.  Abdomen is nontender and no ankle edema.    Assessment/Plan  1. Essential hypertension  Adequately controlled.  Mildly high blood pressure today is stress of coming to the clinic as well as recent respiratory illness and prednisone use.  Blood pressure appears better on home checks.  Continue current lisinopril and amlodipine.    He does check her blood pressure, and " if it is high she can take an extra amlodipine for up to 10 mg a day.    Her blood pressure otherwise controlled, routine follow-up in 3-4 months.    Continue regular walking at the assisted living.    2. Pulmonary emphysema, unspecified emphysema type  Nearly back to baseline after recent exacerbation, consistent with a viral URI.    She was given a course of prednisone for 5 days for future exacerbation, she will keep that on hand but not use it now.    She was warned about tachyarrhythmia and tremor risk with beta agonist, as well as interaction with her Symbicort.  - albuterol (PROAIR HFA;PROVENTIL HFA;VENTOLIN HFA) 90 mcg/actuation inhaler; Inhale 2 puffs every 6 (six) hours as needed for wheezing.  Dispense: 1 each; Refill: 6  - predniSONE (DELTASONE) 20 MG tablet; Take 1 tablet (20 mg total) by mouth daily. Take one tablet a day for 5 days.  Dispense: 5 tablet; Refill: 0    One-year follow-up CT of the chest for screening in November.  Stable, no further screening planned.    3. Macular degeneration  Stable and followed by ophthalmology    4. Anxiety  Controlled on Prozac and rare use of lorazepam.  Tolerates lorazepam and no falls.    5. Medication monitoring encounter  History of low sodium, has recently been okay.  - Basic Metabolic Panel    History of tongue and lip swelling from previous inhalers, avoiding that class of inhalers, as above    Return in about 4 months (around 7/9/2018) for Recheck.   Patient Instructions   No change in treatment plan.    Routine follow-up in 3-4 months.    Lab work today to check kidney labs and potassium.    Usman Gaines MD      Current Outpatient Prescriptions   Medication Sig Dispense Refill     amLODIPine (NORVASC) 5 MG tablet TAKE ONE TABLET BY MOUTH ONE TIME DAILY 90 tablet 1     budesonide-formoterol (SYMBICORT) 160-4.5 mcg/actuation inhaler Inhale 2 puffs 2 (two) times a day. 1 Inhaler 11     calcium carbonate (OS-NATALEE) 600 mg (1,500 mg) tablet Take 600 mg by  mouth daily.        cholecalciferol, vitamin D3, (VITAMIN D3) 2,000 unit Tab Once a day 100 each 3     FLUoxetine (PROZAC) 20 MG capsule TAKE ONE CAPSULE BY MOUTH ONE TIME DAILY 90 capsule 3     ibuprofen (ADVIL,MOTRIN) 200 MG tablet Take 200 mg by mouth every 6 (six) hours as needed for pain (takes 2-3 tablets twice a day for joint aches).       lisinopril (PRINIVIL,ZESTRIL) 40 MG tablet TAKE ONE TABLET BY MOUTH ONE TIME DAILY 90 tablet 3     LORazepam (ATIVAN) 0.5 MG tablet TAKE ONE TABLET BY MOUTH TWICE DAILY AS NEEDED FOR ANXIETY. 30 tablet 2     SODIUM CL/POTASSIUM CHLORIDE (THERMOTABS ORAL) Take 2 tablets by mouth daily.        vitamin A-vitamin C-vit E-min (OCUVITE) Tab tablet Take by mouth daily.       vitamin C-vitamin E (CRANBERRY CONCENTRATE) cap Take 2 tablets by mouth once daily.       albuterol (PROAIR HFA;PROVENTIL HFA;VENTOLIN HFA) 90 mcg/actuation inhaler Inhale 2 puffs every 6 (six) hours as needed for wheezing. 1 each 6     predniSONE (DELTASONE) 20 MG tablet Take 1 tablet (20 mg total) by mouth daily. Take one tablet a day for 5 days. 5 tablet 0     No current facility-administered medications for this visit.      Allergies   Allergen Reactions     Sulfa (Sulfonamide Antibiotics) Diarrhea and Nausea Only     Also rash, throat swelling     Incruse Ellipta [Umeclidinium] Angiodema     Mouth and tongue swelling     Social History   Substance Use Topics     Smoking status: Former Smoker     Types: Cigarettes     Quit date: 12/5/2010     Smokeless tobacco: Never Used     Alcohol use No

## 2021-06-16 NOTE — PROGRESS NOTES
Celeste Peguero is a 80 y.o. female who is being evaluated via a billable video visit.      How would you like to obtain your AVS? MyChart.  If dropped from the video visit, the video invitation should be resent by: Text to cell phone: [phone]  Will anyone else be joining your video visit? No      Video Start Time: 1:04 PM    [facility] Follow Up Note    Celeste Peguero   80 y.o. female    Date of Visit: 3/23/2021    No chief complaint on file.    Subjective  Suzanna requested a video visit to avoid clinic visit at this time.  Daughter-in-law, Larry, was present.    Patient reports that she feels her normal self, energy levels are good.  She is walking on a daily basis.  Still helping care for her 99-year-old mother.    She is eating well on a regular basis.    She has a history of anxiety and poor p.o. intake associated with low sodium.  But sodium level was normal last August.  She is still on the sodium chloride tablet 1 g daily.  No increasing edema.    Severe COPD.  Quit smoking a number of years ago.  Has been stable with current Symbicort inhaler.  No increased inhaler use.  Saw pulmonary medicine last August.    She has not wanted to repeat chest CT scan.  Last chest CT scan in 2017 showed 2 mm left lower lobe nodule and some bronchiectasis.    Anxiety is controlled on Prozac and just occasional lorazepam about 2 a month, but she is requesting refill.  Occasional nausea in the past and has used Zofran but no complaints of worsening.    Past history of hypertension, denies orthostasis.  I did review labs from August 2020 with a creatinine of 0.84.    On lisinopril 40 mg a day and amlodipine 10 mg a day.    The daughter-in-law brought the blood pressure cuff over but was not able to work.    Patient denies chest pain or palpitations or lightheaded dizzy spells.    Patient did have a nontraumatic fall a number of weeks ago when her feet got tangled in her bedding as she was  changing the bed, but she denies syncope or injury other than some knee pain which is now getting better and she continues with ambulation.    Bowels are regular, no abdominal pain complaints.    She is up-to-date on appointments for her severe macular degeneration.    She is 113 pounds currently.  Back in 2019 she was 111 pounds.  She feels her strength levels are good.    PMHx:    Past Medical History:   Diagnosis Date     Acute H. pylori gastric ulcer 2010    Treated with no further Sx     Anxiety and depression     Stabilized on prozac 20mg qday.  Uses prn lorazepam.     Hiatal hernia      HTN (hypertension)      Hyponatremia     severe/ hospitalized when Tx for H. pylori/ ulcer.  No longer on HCTZ.     Macular degeneration     Wet with submacular hem R eye.  Avastin shots given x4 8/11 - 1/12.     Osteoporosis     4/13 DEXA S-1.9/F-3.5.  alendronate for two years, then teeth fell out.  Prolia x2 infections,then Reclast started 12/13.  No fracture Hx.       Shingles 2009    Thorasic, no post herpetic neuralgia.     PSHx:    Past Surgical History:   Procedure Laterality Date     TONSILECTOMY, ADENOIDECTOMY, BILATERAL MYRINGOTOMY AND TUBES      No other surgeries.       Immunizations:   Immunization History   Administered Date(s) Administered     Influenza high dose,seasonal,PF, 65+ yrs 09/14/2015, 11/08/2016, 10/16/2017, 09/25/2018, 10/03/2019     Influenza, inj, historic,unspecified 09/01/2014     Pneumo Conj 13-V (2010&after) 12/05/2014     Pneumo Polysac 23-V 03/10/2017       ROS A comprehensive review of systems was performed and was otherwise negative    Medications, allergies, and problem list were reviewed and updated    Exam  There were no vitals taken for this visit.  Appears well on video.  She is bright and alert.  Color is good and face.  There is no dyspnea.  Moderate kyphosis, baseline.    Assessment/Plan  1. Essential hypertension  Has been controlled but has not been checked recently.  The  daughter-in-law will try to get the blood pressure machine to work and report back blood pressures.  See patient instructions.  Continue current medications.    Plan follow-up this summer for physical with labs at that time.    History of hyponatremia has been normal with good oral intake as her anxiety has been controlled.  She continues on 1 g of sodium chloride, as long as no increased edema or blood pressure issues.    2. Pulmonary emphysema, unspecified emphysema type (H)  Severe but stable.  She did request a refill of her Z-Pedro and prednisone in case of future exacerbation, but has not used that recently.    Continue the Symbicort twice daily and albuterol as needed.    She has completed the COVID-19 vaccine series.      - azithromycin (ZITHROMAX) 250 MG tablet; Take two pills the first day and then one pill a day for four more days.  Take with prednisone prescription, if respiratory infection  Dispense: 6 tablet; Refill: 0  - predniSONE (DELTASONE) 20 MG tablet; TAKE 2 TABLETS (40 MG) ON THE FIRST DAY AND 1 TABLET (20 MG) UNTIL GONE. TAKEN WITH AZITHROMYCIN..  Dispense: 6 tablet; Refill: 0    3. Anxiety  Controlled on Prozac.  Occasional use lorazepam.  See previous discussion on the risks discussion with lorazepam.  Patient accepts risk  - LORazepam (ATIVAN) 0.5 MG tablet; Take 1 tablet (0.5 mg total) by mouth 2 (two) times a day.  Dispense: 30 tablet; Refill: 0    4. Macular degeneration, unspecified laterality, unspecified type  Stable, managed by ophthalmology, she denies being overdue for visit.    Continue vitamin D supplement 2000 IUs a day.    Return in 3 months (on 6/23/2021) for Annual physical.   Patient Instructions   No change in treatment plan.    Check blood pressure with goal blood pressure less than 140/85.    Contact me if any concerns, otherwise I will see you for your physical exam on June 23 at 1 PM, do not fast for that appointment.    Usman Gaines MD        Current Outpatient  Medications   Medication Sig Dispense Refill     albuterol (PROAIR HFA;PROVENTIL HFA;VENTOLIN HFA) 90 mcg/actuation inhaler Inhale 2 puffs every 6 (six) hours as needed for wheezing. 1 each 6     amLODIPine (NORVASC) 10 MG tablet TAKE 1 TABLET (10 MG TOTAL) BY MOUTH DAILY. 90 tablet 3     budesonide-formoteroL (SYMBICORT) 160-4.5 mcg/actuation inhaler Inhale 2 puffs 2 (two) times a day. 1 Inhaler 12     calcium carbonate (OS-NATALEE) 600 mg (1,500 mg) tablet Take 600 mg by mouth daily.        cholecalciferol, vitamin D3, (VITAMIN D3) 2,000 unit Tab Once a day 100 each 3     FLUoxetine (PROZAC) 20 MG capsule TAKE 1 CAPSULE BY MOUTH ONE TIME DAILY 90 capsule 3     ibuprofen (ADVIL,MOTRIN) 200 MG tablet Take 200 mg by mouth every 6 (six) hours as needed for pain (takes 2-3 tablets twice a day for joint aches).       lisinopriL (PRINIVIL,ZESTRIL) 40 MG tablet TAKE 1 TABLET (40 MG TOTAL) BY MOUTH DAILY. 90 tablet 2     ondansetron (ZOFRAN ODT) 4 MG disintegrating tablet Take 1 tablet (4 mg total) by mouth every 8 (eight) hours as needed for nausea. 30 tablet 1     sodium chloride 1 gram tablet Take 1 tablet (1 g total) by mouth daily.  0     vitamin A-vitamin C-vit E-min (OCUVITE) Tab tablet Take by mouth daily.       vitamin C-vitamin E (CRANBERRY CONCENTRATE) cap Take 2 tablets by mouth once daily.       azithromycin (ZITHROMAX) 250 MG tablet Take two pills the first day and then one pill a day for four more days.  Take with prednisone prescription, if respiratory infection 6 tablet 0     LORazepam (ATIVAN) 0.5 MG tablet Take 1 tablet (0.5 mg total) by mouth 2 (two) times a day. 30 tablet 0     predniSONE (DELTASONE) 20 MG tablet TAKE 2 TABLETS (40 MG) ON THE FIRST DAY AND 1 TABLET (20 MG) UNTIL GONE. TAKEN WITH AZITHROMYCIN.. 6 tablet 0     No current facility-administered medications for this visit.      Allergies   Allergen Reactions     Sulfa (Sulfonamide Antibiotics) Diarrhea and Nausea Only     Also rash, throat  swelling     Incruse Ellipta [Umeclidinium] Angioedema     Mouth and tongue swelling     Social History     Tobacco Use     Smoking status: Former Smoker     Types: Cigarettes     Quit date: 12/5/2010     Years since quitting: 10.3     Smokeless tobacco: Never Used   Substance Use Topics     Alcohol use: No     Drug use: No           Video-Visit Details    Type of service:  Video Visit    Video End Time (time video stopped): 1:10 PM  Originating Location (pt. Location): Home    Distant Location (provider location):  [facility]     Platform used for Video Visit: Saroj

## 2021-06-17 NOTE — TELEPHONE ENCOUNTER
Controlled Substance Refill Request  Medication Name:   Requested Prescriptions     Pending Prescriptions Disp Refills     predniSONE (DELTASONE) 20 MG tablet [Pharmacy Med Name: PREDNISONE 20MG TABLET 20 MG Tablet] 6 tablet 0     Sig: TAKE 2 TABLETS (40 MG) ON THE FIRST DAY AND 1 TABLET (20 MG) UNTIL GONE. TAKEN WITH AZITHROMYCIN..     LORazepam (ATIVAN) 0.5 MG tablet [Pharmacy Med Name: LORAZEPAM 0.5MG TABLET 0.5 Tablet] 30 tablet 0     Sig: TAKE 1 TABLET (0.5 MG TOTAL) BY MOUTH 2 (TWO) TIMES A DAY.     Date Last Fill: 3/23/21  Requested Pharmacy: [facility]  Submit electronically to pharmacy  Controlled Substance Agreement on file:   Encounter-Level CSA Scan Date:    There are no encounter-level csa scan date.        Last office visit:  3/23/21

## 2021-06-17 NOTE — TELEPHONE ENCOUNTER
RN cannot approve Refill Request    RN can NOT refill this medication med is not covered by policy/route to provider. Last office visit: 12/26/2019 Usman Gaines MD Last Physical: Visit date not found Last MTM visit: Visit date not found Last visit same specialty: 12/26/2019 Usman Gaines MD.  Next visit within 3 mo: Visit date not found  Next physical within 3 mo: Visit date not found      Alex MUSTAFA Anuj, [facility] Triage/Med Refill 5/3/2021    Requested Prescriptions   Pending Prescriptions Disp Refills     predniSONE (DELTASONE) 20 MG tablet [Pharmacy Med Name: PREDNISONE 20MG TABLET 20 MG Tablet] 6 tablet 0     Sig: TAKE 2 TABLETS (40 MG) ON THE FIRST DAY AND 1 TABLET (20 MG) UNTIL GONE. TAKEN WITH AZITHROMYCIN..       There is no refill protocol information for this order        LORazepam (ATIVAN) 0.5 MG tablet [Pharmacy Med Name: LORAZEPAM 0.5MG TABLET 0.5 Tablet] 30 tablet 0     Sig: TAKE 1 TABLET (0.5 MG TOTAL) BY MOUTH 2 (TWO) TIMES A DAY.       Controlled Substances Refill Protocol Failed - 5/3/2021  3:06 PM        Failed - Route all Controlled Substance Requests to Provider        Failed - Patient has controlled substance agreement in past 12 months     Encounter-Level CSA Scan Date:    There are no encounter-level csa scan date.               Passed - Visit with PCP or prescribing provider visit in past 12 months      Last office visit with prescriber/PCP: 12/26/2019 Usman Gaines MD OR same dept: Visit date not found OR same specialty: 12/26/2019 Usman Gaines MD Last physical: Visit date not found Last MTM visit: Visit date not found    Next visit within 3 mo: Visit date not found  Next physical within 3 mo: Visit date not found  Prescriber OR PCP: Usman Gaines MD  Last diagnosis associated with med order: 1. Pulmonary emphysema, unspecified emphysema type (H)  - predniSONE (DELTASONE) 20 MG tablet [Pharmacy Med Name: PREDNISONE 20MG TABLET 20 MG Tablet]; TAKE 2 TABLETS (40 MG) ON THE  FIRST DAY AND 1 TABLET (20 MG) UNTIL GONE. TAKEN WITH AZITHROMYCIN..  Dispense: 6 tablet; Refill: 0    2. Anxiety  - LORazepam (ATIVAN) 0.5 MG tablet [Pharmacy Med Name: LORAZEPAM 0.5MG TABLET 0.5 Tablet]; Take 1 tablet (0.5 mg total) by mouth 2 (two) times a day.  Dispense: 30 tablet; Refill: 0

## 2021-06-18 NOTE — LETTER
Letter by Usman Gaines MD at      Author: Usman Gaines MD Service: -- Author Type: --    Filed:  Encounter Date: 2/28/2019 Status: (Other)       Celeste Peguero  [address]  [place] [address]             February 28, 2019         Dear Ms. Yong Peguero,    Below are the results from your recent visit:    Resulted Orders   Basic Metabolic Panel   Result Value Ref Range    Sodium 135 (L) 136 - 145 mmol/L    Potassium 4.4 3.5 - 5.0 mmol/L    Chloride 99 98 - 107 mmol/L    CO2 21 (L) 22 - 31 mmol/L    Anion Gap, Calculation 15 5 - 18 mmol/L    Glucose 110 70 - 125 mg/dL    Calcium 9.4 8.5 - 10.5 mg/dL    BUN 8 8 - 28 mg/dL    Creatinine 0.86 0.60 - 1.10 mg/dL    GFR MDRD Af Amer >60 >60 mL/min/1.73m2    GFR MDRD Non Af Amer >60 >60 mL/min/1.73m2    Narrative    Fasting Glucose reference range is 70-99 mg/dL per  American Diabetes Association (ADA) guidelines.   HM2(CBC w/o Differential)   Result Value Ref Range    WBC 13.4 (H) 4.0 - 11.0 thou/uL    RBC 4.37 3.80 - 5.40 mill/uL    Hemoglobin 13.6 12.0 - 16.0 g/dL    Hematocrit 41.4 35.0 - 47.0 %    MCV 95 80 - 100 fL    MCH 31.1 27.0 - 34.0 pg    MCHC 32.9 32.0 - 36.0 g/dL    RDW 12.3 11.0 - 14.5 %    Platelets 335 140 - 440 thou/uL    MPV 7.2 7.0 - 10.0 fL   Vitamin D, Total (25-Hydroxy)   Result Value Ref Range    Vitamin D, Total (25-Hydroxy) 21.4 (L) 30.0 - 80.0 ng/mL    Narrative    Deficiency <10.0 ng/mL  Insufficiency 10.0-29.9 ng/mL  Sufficiency 30.0-80.0 ng/mL  Toxicity (possible) >100.0 ng/mL       Sodium level is okay.  Continue current sodium chloride supplement.    Kidney labs are otherwise normal.    Blood sugar is normal.    WBC level is okay.  Hemoglobin level normal.    Vitamin D level mildly low.  I would suggest increasing your vitamin D supplement to 2000 IU a day.    Otherwise, no change in treatment plan.    Please call with questions or contact us using MyChart.    Sincerely,        Electronically signed by Usman CORRIGAN  MD Eder

## 2021-06-19 NOTE — PROGRESS NOTES
[facility] clinic Follow Up Note    Celeste Peguero   78 y.o. female    Date of Visit: 8/1/2018    Chief Complaint   Patient presents with     Follow-up     not feeling good, SOB, vision worse, tired, hypertension.      Subjective  Ambreen is here for a blood pressure follow-up earlier than planned.  She called earlier this week with blood pressure spikes up to 200/100 with fatigue and shortness of breath.    Her 97-year-old mother and her brother visited 1-1/2 weeks ago which was very emotionally stressful for her.  She apparently forgot to take her amlodipine sometime around then.    She did continue on lisinopril 40 mg a day.    Over the past couple of days blood pressure is now running 131//79    She has been quite sad recently with her dog passing away, Sofia.  She has not been walking her usual 2-3 times a day.  She has been eating more snack foods and higher salt foods.    She has a past history of hyponatremia and still takes sodium chloride pill twice a day.    No diarrhea or abdominal pain.    No chest pain or palpitations.    She does feel her heart beating somewhat harder and occasionally heart rate is up to 108 but not irregular.  No history of atrial fibrillation.  No lower extremity edema.    Her macular degeneration is severe but she did feel her vision got somewhat worse when her blood pressure was high, is not getting better again.    She had a mild headache, now resolved.    Her shortness of breath is largely resolved.  She has some mild shortness of breath with coming in the clinic today.  No new cough or fever.    Chronic anxiety on Prozac, exacerbation recently with the visit of her mother and the death of her dog.  She has been using more Ativan than usual, still as needed.  No falls.    She continues on her current inhalers.  She quit smoking 2011 with severe COPD, FEV1 of 0.67, 32% with air trapping earlier this year.    PMHx:    Past Medical History:   Diagnosis Date      "Acute H. pylori gastric ulcer 2010    Treated with no further Sx     Anxiety and depression     Stabilized on prozac 20mg qday.  Uses prn lorazepam.     Hiatal hernia      HTN (hypertension)      Hyponatremia     severe/ hospitalized when Tx for H. pylori/ ulcer.  No longer on HCTZ.     Macular degeneration     Wet with submacular hem R eye.  Avastin shots given x4 8/11 - 1/12.     Osteoporosis     4/13 DEXA S-1.9/F-3.5.  alendronate for two years, then teeth fell out.  Prolia x2 infections,then Reclast started 12/13.  No fracture Hx.       Shingles 2009    Thorasic, no post herpetic neuralgia.     PSHx:    Past Surgical History:   Procedure Laterality Date     TONSILECTOMY, ADENOIDECTOMY, BILATERAL MYRINGOTOMY AND TUBES      No other surgeries.       Immunizations:   Immunization History   Administered Date(s) Administered     Influenza high dose, seasonal 09/14/2015, 11/08/2016, 10/16/2017     Influenza, inj, historic,unspecified 09/01/2014     Pneumo Conj 13-V (2010&after) 12/05/2014     Pneumo Polysac 23-V 03/10/2017       ROS A comprehensive review of systems was performed and was otherwise negative    Medications, allergies, and problem list were reviewed and updated    Exam  /70  Pulse 78  Ht 5' 4.5\" (1.638 m)  Wt 119 lb (54 kg)  SpO2 95%  BMI 20.11 kg/m2  Frail elderly female in no apparent distress and 95% O2 sat on room air.  No conjunctivitis.  Lungs show decreased breath sounds throughout but no wheezing or crackles.  Heart is regular with no murmur, just occasional premature beats.  Abdomen is nontender.  No ankle edema.  No neurologic changes.    Assessment/Plan  1. Essential hypertension  High blood pressure spike consistent with accidentally going off her amlodipine.  Also concurrent stress of her visiting mother and the recent death of her dog, as well as significant reduction in her regular walking, as well as diet changes are likely affecting.    Her blood pressure is significantly " better back on amlodipine, now on the higher dose of 10 mg a day.  She will stay on a higher dose of amlodipine until reevaluation in 2 weeks.    I have asked her to return to walking the hallways of her assisted living 2-3 times a day.    Reduce salty snacks and do not take her sodium chloride supplement if her blood pressures above 140/85.    The daughter-in-law will report what her blood pressure is running to me next week.    History of hyponatremia, will not be able to add a diuretic.    Continue lisinopril 40 mg a day.    Could consider low-dose Toprol-XL, but does have severe COPD.    2. Pulmonary emphysema, unspecified emphysema type (H)  Severe.  I do not feel she is having a COPD exacerbation.    I suspect her hypertension, some mild increase in pulmonary arterial pressures/diastolic dysfunction, but is now passing.  Warnings were given to the patient and daughter to seek medical attention immediately if significant shortness of breath at rest.    Continue current inhalers.    CT scan of the chest November 2017 showed a stable 2 mm left lower lobe nodule with one-year follow-up plan.    3. Medication monitoring encounter  History of hyponatremia and recent hypertension spike.  - Basic Metabolic Panel      Patient denies any Advil or NSAID use recently.  4. Anxiety  Significant spike with events as above.  Now improving.  Continue Prozac.  I encouraged her to take the lorazepam sparingly.  I again reviewed the significant fall and confusion risk with that medication.    Severe macular degeneration, managed by ophthalmology    Return in 2 weeks (on 8/15/2018) for Recheck.   Patient Instructions   Continue amlodipine at 10 mg a day.    Continue lisinopril 40 mg a day.    Walk 2-3 times a day, every day.    Avoid high salt foods, especially snack foods.    Do not take your sodium chloride supplement if your blood pressure is over 140/85.    Contact me next week to report what your blood pressure is  running.    See me August 15 as planned.        Usman Gaines MD  I spent a total time with patient of over 25 minutes and over 50% coord care.  Time all face to face.      Current Outpatient Prescriptions   Medication Sig Dispense Refill     albuterol (PROAIR HFA;PROVENTIL HFA;VENTOLIN HFA) 90 mcg/actuation inhaler Inhale 2 puffs every 6 (six) hours as needed for wheezing. 1 each 6     amLODIPine (NORVASC) 5 MG tablet TAKE ONE TABLET BY MOUTH ONE TIME DAILY 90 tablet 3     budesonide-formoterol (SYMBICORT) 160-4.5 mcg/actuation inhaler Inhale 2 puffs 2 (two) times a day. 1 Inhaler 11     calcium carbonate (OS-NATALEE) 600 mg (1,500 mg) tablet Take 600 mg by mouth daily.        cholecalciferol, vitamin D3, (VITAMIN D3) 2,000 unit Tab Once a day 100 each 3     FLUoxetine (PROZAC) 20 MG capsule TAKE 1 CAPSULE BY MOUTH ONE TIME DAILY 90 capsule 3     lisinopril (PRINIVIL,ZESTRIL) 40 MG tablet Take 1 tablet (40 mg total) by mouth daily. 90 tablet 3     LORazepam (ATIVAN) 0.5 MG tablet TAKE 1 TABLET BY MOUTH TWICE A DAY AS NEEDED FOR ANXIETY. 30 tablet 3     SODIUM CL/POTASSIUM CHLORIDE (THERMOTABS ORAL) Take 2 tablets by mouth daily.        vitamin A-vitamin C-vit E-min (OCUVITE) Tab tablet Take by mouth daily.       vitamin C-vitamin E (CRANBERRY CONCENTRATE) cap Take 2 tablets by mouth once daily.       ibuprofen (ADVIL,MOTRIN) 200 MG tablet Take 200 mg by mouth every 6 (six) hours as needed for pain (takes 2-3 tablets twice a day for joint aches).       predniSONE (DELTASONE) 20 MG tablet Take 1 tablet (20 mg total) by mouth daily. Take one tablet a day for 5 days. 5 tablet 0     No current facility-administered medications for this visit.      Allergies   Allergen Reactions     Sulfa (Sulfonamide Antibiotics) Diarrhea and Nausea Only     Also rash, throat swelling     Incruse Ellipta [Umeclidinium] Angiodema     Mouth and tongue swelling     Social History   Substance Use Topics     Smoking status: Former Smoker      Types: Cigarettes     Quit date: 12/5/2010     Smokeless tobacco: Never Used     Alcohol use No

## 2021-06-19 NOTE — LETTER
Letter by Usman Gaines MD at      Author: Usman Gaines MD Service: -- Author Type: --    Filed:  Encounter Date: 7/3/2019 Status: (Other)         Celeste Peguero  [address]  [place] [address]             July 3, 2019         Dear Ms. Yong Peguero,    Below are the results from your recent visit:    Resulted Orders   Basic Metabolic Panel   Result Value Ref Range    Sodium 134 (L) 136 - 145 mmol/L    Potassium 4.2 3.5 - 5.0 mmol/L    Chloride 96 (L) 98 - 107 mmol/L    CO2 29 22 - 31 mmol/L    Anion Gap, Calculation 9 5 - 18 mmol/L    Glucose 134 (H) 70 - 125 mg/dL    Calcium 10.2 8.5 - 10.5 mg/dL    BUN 7 (L) 8 - 28 mg/dL    Creatinine 0.82 0.60 - 1.10 mg/dL    GFR MDRD Af Amer >60 >60 mL/min/1.73m2    GFR MDRD Non Af Amer >60 >60 mL/min/1.73m2    Narrative    Fasting Glucose reference range is 70-99 mg/dL per  American Diabetes Association (ADA) guidelines.   Vitamin D, Total (25-Hydroxy)   Result Value Ref Range    Vitamin D, Total (25-Hydroxy) 25.8 (L) 30.0 - 80.0 ng/mL    Narrative    Deficiency <10.0 ng/mL  Insufficiency 10.0-29.9 ng/mL  Sufficiency 30.0-80.0 ng/mL  Toxicity (possible) >100.0 ng/mL       Sodium level is OK.  Kidney labs otherwise normal.  Blood sugar level was not fasting, OK.    Vitamin D level is slightly higher, but still on the low side.  Continue the higher dose of 2000 IU vitamin D supplement a day.      No change in treatment plan.      Please call with questions or contact us using [employer].    Sincerely,        Electronically signed by Usman Gaines MD

## 2021-06-19 NOTE — PROGRESS NOTES
[facility] clinic Follow Up Note    Celeste Peguero   78 y.o. female    Date of Visit: 8/15/2018    Chief Complaint   Patient presents with     Follow-up     2 wk follow up     Subjective  Ambreen is here for blood pressure follow-up.  Also with a past history of COPD but without current flare.  Quit smoking 2011.  Still on Symbicort.    Patient had significant spike in her blood pressure when she went off amlodipine.  She was also under stress with her mother visiting and she had not been walking as well recently after the death of her dog, Sofia.    On August 1 patient's blood pressure was running significantly better in the 130s-150s over 60s-70s.  I did have her continue on the higher dose of amlodipine at 10 mg a day which she is on now.  If she takes 5 mg a day her blood pressure does go up as high as 160/90.    She has not had any new headaches or chest pain or edema since her last visit.    She is taking a reduced dose of the sodium chloride pills just 1 a day, sometimes a half pill in the afternoon.  He can eating a better diet.    Still on lisinopril 40 mg a day.    No palpitations.  No flare of her anxiety.    Last Friday she developed some chills felt like she was getting urinary tract infection was some increased urinary frequency.  No fevers.  She asked for a Cipro prescription which she took, she felt remarkably better within 24 hours.  No urinary symptoms now.  No diarrhea.    PMHx:    Past Medical History:   Diagnosis Date     Acute H. pylori gastric ulcer 2010    Treated with no further Sx     Anxiety and depression     Stabilized on prozac 20mg qday.  Uses prn lorazepam.     Hiatal hernia      HTN (hypertension)      Hyponatremia     severe/ hospitalized when Tx for H. pylori/ ulcer.  No longer on HCTZ.     Macular degeneration     Wet with submacular hem R eye.  Avastin shots given x4 8/11 - 1/12.     Osteoporosis     4/13 DEXA S-1.9/F-3.5.  alendronate for two years, then teeth fell  "out.  Prolia x2 infections,then Reclast started 12/13.  No fracture Hx.       Shingles 2009    Thorasic, no post herpetic neuralgia.     PSHx:    Past Surgical History:   Procedure Laterality Date     TONSILECTOMY, ADENOIDECTOMY, BILATERAL MYRINGOTOMY AND TUBES      No other surgeries.       Immunizations:   Immunization History   Administered Date(s) Administered     Influenza high dose, seasonal 09/14/2015, 11/08/2016, 10/16/2017     Influenza, inj, historic,unspecified 09/01/2014     Pneumo Conj 13-V (2010&after) 12/05/2014     Pneumo Polysac 23-V 03/10/2017       ROS A comprehensive review of systems was performed and was otherwise negative    Medications, allergies, and problem list were reviewed and updated    Exam  /70  Pulse 93  Ht 5' 4.5\" (1.638 m)  Wt 121 lb (54.9 kg)  SpO2 97%  BMI 20.45 kg/m2  Lungs with decreased breath sounds throughout but otherwise clear.  No wheezing or crackles.  Heart is regular without murmur.  Abdomen nontender.  No ankle edema.  No neurologic changes.    Assessment/Plan  1. HTN (hypertension)  Improved control.  Goal blood pressure 120//80.  Occasional higher spikes at home on the lower dose of amlodipine.    Continue on the 10 mg dose of amlodipine as long as no worsening fatigue or edema.    If she does develop worsening edema, she may need to go back down the amlodipine to 5 mg a day, at that point I discussed with her adding Toprol-XL 25 mg a day.    I did discuss risks of Toprol-XL including worsening her emphysema with wheezing, low heart rate with syncope risk and fatigue risk.  Patient stated she would accept these risks if needed to be added.    No diuretics because of her history of low sodium.    She will also stay on the lower dose of her sodium chloride supplement.    Emphasized the importance of regular walking.    Continue lisinopril at 40 mg a day.    I reviewed the lab work from August 1 with sodium level of 135, potassium 4.2 and normal " creatinine.  - amLODIPine (NORVASC) 10 MG tablet; Take 1 tablet (10 mg total) by mouth daily.  Dispense: 90 tablet; Refill: 1    COPD no flare, continue inhaler.    Macular degeneration, continue with follow-up with ophthalmology.    Anxiety, chronic currently stable.  It helps to spend more time with her neighbors and other dogs in her residence building.  Has occasional Ativan as needed anxiety flares.    UTI, symptoms cleared with Cipro.  She will finish the Cipro course.    Return in 6 weeks (on 9/25/2018) for Recheck.   Patient Instructions   Continue at the 10 mg a day dose of amlodipine.    A new prescription was sent in for 10 mg amlodipine pills.    Continue lisinopril 40 mg once a day.    Continue to walk on a daily basis.    Continue on the lower dose of your sodium chloride pills.    If blood pressure gets above 160/90, contact me for additional blood pressure medication.    Otherwise, follow-up with me September 25.    Usman Gaines MD        Current Outpatient Prescriptions   Medication Sig Dispense Refill     albuterol (PROAIR HFA;PROVENTIL HFA;VENTOLIN HFA) 90 mcg/actuation inhaler Inhale 2 puffs every 6 (six) hours as needed for wheezing. 1 each 6     amLODIPine (NORVASC) 10 MG tablet Take 1 tablet (10 mg total) by mouth daily. 90 tablet 1     budesonide-formoterol (SYMBICORT) 160-4.5 mcg/actuation inhaler Inhale 2 puffs 2 (two) times a day. 1 Inhaler 11     calcium carbonate (OS-NATALEE) 600 mg (1,500 mg) tablet Take 600 mg by mouth daily.        cholecalciferol, vitamin D3, (VITAMIN D3) 2,000 unit Tab Once a day 100 each 3     ciprofloxacin HCl (CIPRO) 250 MG tablet TAKE 1 TABLET (250 MG TOTAL) BY MOUTH 2 (TWO) TIMES A DAY FOR 5 DAYS 10 tablet 0     FLUoxetine (PROZAC) 20 MG capsule TAKE 1 CAPSULE BY MOUTH ONE TIME DAILY 90 capsule 3     ibuprofen (ADVIL,MOTRIN) 200 MG tablet Take 200 mg by mouth every 6 (six) hours as needed for pain (takes 2-3 tablets twice a day for joint aches).       lisinopril  (PRINIVIL,ZESTRIL) 40 MG tablet Take 1 tablet (40 mg total) by mouth daily. 90 tablet 3     LORazepam (ATIVAN) 0.5 MG tablet TAKE 1 TABLET BY MOUTH TWICE A DAY AS NEEDED FOR ANXIETY. 30 tablet 3     SODIUM CL/POTASSIUM CHLORIDE (THERMOTABS ORAL) Take 2 tablets by mouth daily.        vitamin A-vitamin C-vit E-min (OCUVITE) Tab tablet Take by mouth daily.       vitamin C-vitamin E (CRANBERRY CONCENTRATE) cap Take 2 tablets by mouth once daily.       predniSONE (DELTASONE) 20 MG tablet Take 1 tablet (20 mg total) by mouth daily. Take one tablet a day for 5 days. 5 tablet 0     No current facility-administered medications for this visit.      Allergies   Allergen Reactions     Sulfa (Sulfonamide Antibiotics) Diarrhea and Nausea Only     Also rash, throat swelling     Incruse Ellipta [Umeclidinium] Angioedema     Mouth and tongue swelling     Social History   Substance Use Topics     Smoking status: Former Smoker     Types: Cigarettes     Quit date: 12/5/2010     Smokeless tobacco: Never Used     Alcohol use No

## 2021-06-20 NOTE — LETTER
Letter by Usman Gaines MD at      Author: Usman Gaines MD Service: -- Author Type: --    Filed:  Encounter Date: 12/27/2019 Status: Signed         Celeste Peguero  [address]  [place] [address]             December 27, 2019         Dear Ms. Yong Peguero,    Below are the results from your recent visit:    Resulted Orders   Comprehensive Metabolic Panel   Result Value Ref Range    Sodium 138 136 - 145 mmol/L    Potassium 3.3 (L) 3.5 - 5.0 mmol/L    Chloride 94 (L) 98 - 107 mmol/L    CO2 28 22 - 31 mmol/L    Anion Gap, Calculation 16 5 - 18 mmol/L    Glucose 123 70 - 125 mg/dL    BUN 13 8 - 28 mg/dL    Creatinine 0.89 0.60 - 1.10 mg/dL    GFR MDRD Af Amer >60 >60 mL/min/1.73m2    GFR MDRD Non Af Amer >60 >60 mL/min/1.73m2    Bilirubin, Total 0.5 0.0 - 1.0 mg/dL    Calcium 9.4 8.5 - 10.5 mg/dL    Protein, Total 6.9 6.0 - 8.0 g/dL    Albumin 4.1 3.5 - 5.0 g/dL    Alkaline Phosphatase 60 45 - 120 U/L    AST 21 0 - 40 U/L    ALT 19 0 - 45 U/L    Narrative    Fasting Glucose reference range is 70-99 mg/dL per  American Diabetes Association (ADA) guidelines.   HM2(CBC w/o Differential)   Result Value Ref Range    WBC 12.9 (H) 4.0 - 11.0 thou/uL    RBC 4.75 3.80 - 5.40 mill/uL    Hemoglobin 14.1 12.0 - 16.0 g/dL    Hematocrit 43.9 35.0 - 47.0 %    MCV 92 80 - 100 fL    MCH 29.6 27.0 - 34.0 pg    MCHC 32.0 32.0 - 36.0 g/dL    RDW 12.6 11.0 - 14.5 %    Platelets 433 140 - 440 thou/uL    MPV 7.2 7.0 - 10.0 fL   Thyroid Stimulating Hormone (TSH)   Result Value Ref Range    TSH 2.21 0.30 - 5.00 uIU/mL       Sodium level is normal.  Kidney labs are normal.    Potassium level was borderline low on the day of your visit.  Usually your potassium level is okay.  At this time I would recommend more higher potassium containing foods, such as in fruits and vegetables.  Recheck potassium level again in March at your next visit.    Liver tests are normal.    WBC level is stable from previous, looks okay.   Other blood counts are normal.  Normal hemoglobin.    Thyroid test is normal.    Please call with questions or contact us using MyChart.    Sincerely,        Electronically signed by Usman Gaines MD

## 2021-06-20 NOTE — LETTER
"Letter by Ayden Duenas MD at      Author: Ayden Duenas MD Service: -- Author Type: --    Filed:  Encounter Date: 8/4/2020 Status: (Other)         Usman Gaines MD  [facility]  [address] [place] [address]                                  August 4, 2020    Patient: Celeste Peguero   MR Number: [id number]   YOB: 1940   Date of Visit: 8/4/2020     Dear Dr. Eder MD:    Thank you for referring Celeste Peguero to me for evaluation. Below are the relevant portions of my assessment and plan of care.    If you have questions, please do not hesitate to call me. I look forward to following Celeste along with you.    Sincerely,        Ayden Duenas MD          CC  No Recipients  Ayden Duenas MD  8/4/2020 10:43 AM  Sign when Signing Visit  Celeste Peguero is a 80 y.o. female who is being evaluated via a billable telephone visit.      The patient has been notified of following:     \"This telephone visit will be conducted via a call between you and your physician/provider. We have found that certain health care needs can be provided without the need for a physical exam.  This service lets us provide the care you need with a short phone conversation.  If a prescription is necessary we can send it directly to your pharmacy.  If lab work is needed we can place an order for that and you can then stop by our lab to have the test done at a later time.    Telephone visits are billed at different rates depending on your insurance coverage. During this emergency period, for some insurers they may be billed the same as an in-person visit.  Please reach out to your insurance provider with any questions.    If during the course of the call the physician/provider feels a telephone visit is not appropriate, you will not be charged for this service.\"    Patient has given verbal consent to a Telephone visit? Yes    What phone number would you like to be contacted at? [phone] "     Patient would like to receive their AVS by AVS Preference: MyChart.    Additional provider notes:   She was last seen by Dr. Finnegan in Aug 2019.  Since that time, she reports doing well.  No issues with her COPD.  She feels great, has a lot of energy.   No issues with symbicort. Rare use of rescue inhaler.   She helps take care of her 98 year old mother who lives in the same facility.    Labs  Chem panel unremarkable  CBC OK, no diff    Imaging:  IMPRESSION:   CONCLUSION:  1.  Stable benign 2 mm left lower lobe nodule with no additional suspicious nodules.  2.  Moderate to severe centrilobular emphysema.  3.  Mild right middle lobe bronchiectasis with associated mild fibroatelectasis redemonstrated.    PFTs 2017  FEV1/FVC is 44 and is reduced.  FEV1 is 32% predicted and is reduced.  FVC is 57% predicted and reduced.  There was improvement in spirometry after a single inhaled dose of bronchodilator.  Flow volume loop shows scooping indicating obstruction  TLC is 235% predicted and is increased.  RV is 450% predicted and is increased.  DLCO is 22% predicted and is reduced when it   is corrected for hemoglobin.     Impression:  Full Pulmonary Function Test is abnormal.  PFTs are consistent with severe obstructive disease.  Spirometry is consistent with reversibility.  There is hyperinflation.  There is air-trapping.  Diffusion capacity when corrected for hemoglobin is severely reduced.    Impression: Celeste Peguero is a 79 y.o. female with PMH significant HTN, tobacco use, and emphysema with COPD GOLD class B, here for annual follow up. Doing remarkably well, with good exercise and tolerance and no exacerbations or hospitalizations in the last year    Recommendations:  - continue Symbicort high dose 2 puffs two times a day. Rinse/gargle after use.  - continue albuterol as needed  - encouraged her to exercise and remain active  - UTD with pneumococcal vaccines. Recommend flu shot this Fall.  - no indication  for add'l LDCT lung cancer screening given her age.     All questions anwered.  Follow up in 1 year.    Phone call duration: 15 minutes    Ayden Duenas MD (Avi)  [facility]  Pager [phone]  Clinic [phone]

## 2021-06-20 NOTE — PROGRESS NOTES
[facility] clinic Follow Up Note    Celeste Peguero   78 y.o. female    Date of Visit: 9/25/2018    Chief Complaint   Patient presents with     Follow-up     BP follow up, flu shot     Subjective  Ambreen is here to follow-up on hypertension and her other medical issues.    Patient had an exacerbation of her depression with poor eating habits and reduced walking, after the death of her dog, Sofia.  She also had some stress with her mother visiting in July and had gone off her amlodipine temporarily with high spiking blood pressures.  She had been back on her medication at her last visit on August 15 and her blood pressure was 140/70.  She continued on her current medications at that time, including lisinopril 40 mg a day.    Her blood pressure has been running around 130/65 when she does check it.  She states it somewhat lower in the morning when she first gets up with some mild lightheadedness but that passes quickly.  She denies persistent orthostasis.    She has been ambulating the hallways every day now, improving energy levels and exertional ability.    No increasing shortness of breath.    No chest pain or palpitations.    She had previously been on 5 mg of amlodipine.    No new headaches.  No edema.    She is on just one half tablet of sodium chloride pill a day, occasionally a full pill.    No UTI symptoms.  Previous treatment with Cipro on August.  No diarrhea.    COPD has been stable.  Quit smoking 2011.  She does state she still on the Symbicort no worsening shortness of breath or new cough or hemoptysis.    No chest pain    Poor vision with macular degeneration, routine appointment is due in February.    Moderate chronic anxiety, she feels is improved.  She still takes Lorazepam 2-3 times a week for anxiety spells.  I again reviewed risk of Lorazepam including fall risk and vision risk, but she is tolerated this well in the past and feels the symptomatic benefit outweighs the risk for  "her.    Patient did state that she tried metoprolol many years ago, about 8 years ago, had a significant depression at that time.  That was just after her daughter had , however.    PMHx:    Past Medical History:   Diagnosis Date     Acute H. pylori gastric ulcer     Treated with no further Sx     Anxiety and depression     Stabilized on prozac 20mg qday.  Uses prn lorazepam.     Hiatal hernia      HTN (hypertension)      Hyponatremia     severe/ hospitalized when Tx for H. pylori/ ulcer.  No longer on HCTZ.     Macular degeneration     Wet with submacular hem R eye.  Avastin shots given x4  - .     Osteoporosis      DEXA S-1.9/F-3.5.  alendronate for two years, then teeth fell out.  Prolia x2 infections,then Reclast started .  No fracture Hx.       Shingles 2009    Thorasic, no post herpetic neuralgia.     PSHx:    Past Surgical History:   Procedure Laterality Date     TONSILECTOMY, ADENOIDECTOMY, BILATERAL MYRINGOTOMY AND TUBES      No other surgeries.       Immunizations:   Immunization History   Administered Date(s) Administered     Influenza high dose, seasonal 2015, 2016, 10/16/2017     Influenza, inj, historic,unspecified 2014     Pneumo Conj 13-V (&after) 2014     Pneumo Polysac 23-V 03/10/2017       ROS A comprehensive review of systems was performed and was otherwise negative    Medications, allergies, and problem list were reviewed and updated    Exam  /74  Pulse 86  Ht 5' 4.5\" (1.638 m)  Wt 120 lb (54.4 kg)  SpO2 93%  BMI 20.28 kg/m2  Alert and oriented.  No edema.  Lungs with decreased breath sounds throughout but no wheezing or crackles.  Heart is regular with no murmur.  Abdomen nontender.    Assessment/Plan  1. HTN (hypertension)  Controlled, but some report of lower blood pressures in the morning.  If she does have any worsening lower blood pressures or lightheaded dizziness, reduce the amlodipine down to 5 mg a day.    With her " previous depression, I will not add metoprolol at this time, but could consider low dose in the future.    Continue lisinopril 40 mg a day.    No diuretic, with her history of low sodium.    I emphasized the importance of regular walking on a daily basis, which is helped quite a bit  - lisinopril (PRINIVIL,ZESTRIL) 40 MG tablet; Take 1 tablet (40 mg total) by mouth daily.  Dispense: 90 tablet; Refill: 3    2. Hyponatremia  Now just a half tablet of sodium chloride pill a day.  Eating much better.  Check labs today.    3. Anxiety  Warnings given to patient.  She feels benefit outweighs risk.  Refill given.  Depression controlled with Prozac.  - LORazepam (ATIVAN) 0.5 MG tablet; TAKE 1 TABLET BY MOUTH TWICE A DAY AS NEEDED FOR ANXIETY.  Dispense: 30 tablet; Refill: 3    4. Pulmonary emphysema, unspecified emphysema type (H)  Stable.  No flare.  Continue Symbicort.    Flu shot given today.    5. Macular degeneration  Stable.  Follow-up with ophthalmology in February 6. Medication monitoring encounter    - Basic Metabolic Panel    Return in about 4 months (around 1/25/2019) for Recheck.   Patient Instructions   Flu shot today.    Continue current medications at this time.    If your blood pressure is running less than 120/70, and/or increasing lightheaded spells in the morning, contact me to consider reducing the amlodipine down to 5 mg a day.    Lab work today to check kidney labs and sodium.    Routine follow-up with me in 4 months.    Continue daily walking.        Usman Gaines MD  I spent a total time with patient of over 25 minutes and over 50% coord care.  Time all face to face.      Current Outpatient Prescriptions   Medication Sig Dispense Refill     albuterol (PROAIR HFA;PROVENTIL HFA;VENTOLIN HFA) 90 mcg/actuation inhaler Inhale 2 puffs every 6 (six) hours as needed for wheezing. 1 each 6     amLODIPine (NORVASC) 10 MG tablet Take 1 tablet (10 mg total) by mouth daily. 90 tablet 1     calcium carbonate  (OS-NATALEE) 600 mg (1,500 mg) tablet Take 600 mg by mouth daily.        cholecalciferol, vitamin D3, (VITAMIN D3) 2,000 unit Tab Once a day 100 each 3     FLUoxetine (PROZAC) 20 MG capsule TAKE 1 CAPSULE BY MOUTH ONE TIME DAILY 90 capsule 3     ibuprofen (ADVIL,MOTRIN) 200 MG tablet Take 200 mg by mouth every 6 (six) hours as needed for pain (takes 2-3 tablets twice a day for joint aches).       lisinopril (PRINIVIL,ZESTRIL) 40 MG tablet Take 1 tablet (40 mg total) by mouth daily. 90 tablet 3     LORazepam (ATIVAN) 0.5 MG tablet TAKE 1 TABLET BY MOUTH TWICE A DAY AS NEEDED FOR ANXIETY. 30 tablet 3     SODIUM CL/POTASSIUM CHLORIDE (THERMOTABS ORAL) Take 2 tablets by mouth daily.        vitamin A-vitamin C-vit E-min (OCUVITE) Tab tablet Take by mouth daily.       vitamin C-vitamin E (CRANBERRY CONCENTRATE) cap Take 2 tablets by mouth once daily.       budesonide-formoterol (SYMBICORT) 160-4.5 mcg/actuation inhaler Inhale 2 puffs 2 (two) times a day. 1 Inhaler 11     No current facility-administered medications for this visit.      Allergies   Allergen Reactions     Sulfa (Sulfonamide Antibiotics) Diarrhea and Nausea Only     Also rash, throat swelling     Incruse Ellipta [Umeclidinium] Angioedema     Mouth and tongue swelling     Social History   Substance Use Topics     Smoking status: Former Smoker     Types: Cigarettes     Quit date: 12/5/2010     Smokeless tobacco: Never Used     Alcohol use No

## 2021-06-20 NOTE — LETTER
Letter by Usman Gaines MD at      Author: Usman Gaines MD Service: -- Author Type: --    Filed:  Encounter Date: 8/4/2020 Status: (Other)         Celeste Peguero  [address] [place] [address]             August 4, 2020         Dear Ms. Yong Peguero,    Below are the results from your recent visit:    Resulted Orders   Basic Metabolic Panel   Result Value Ref Range    Sodium 137 136 - 145 mmol/L    Potassium 4.6 3.5 - 5.0 mmol/L    Chloride 101 98 - 107 mmol/L    CO2 26 22 - 31 mmol/L    Anion Gap, Calculation 10 5 - 18 mmol/L    Glucose 136 (H) 70 - 125 mg/dL    Calcium 9.3 8.5 - 10.5 mg/dL    BUN 12 8 - 28 mg/dL    Creatinine 0.84 0.60 - 1.10 mg/dL    GFR MDRD Af Amer >60 >60 mL/min/1.73m2    GFR MDRD Non Af Amer >60 >60 mL/min/1.73m2    Narrative    Fasting Glucose reference range is 70-99 mg/dL per  American Diabetes Association (ADA) guidelines.       Kidney labs are normal.  Sodium level is normal.  Glucose level is okay, as you were not fasting.  No change in treatment plan.    Please call with questions or contact us using [employer].    Sincerely,        Electronically signed by Usman Gaines MD

## 2021-06-21 NOTE — PROGRESS NOTES
Pulmonary Follow Up Note  11/7/2018    Referring Physician: Dr. Gaines  Reason for Follow Up: COPD      Problem List:     Patient Active Problem List   Diagnosis     Macular degeneration     Osteoporosis     HTN (hypertension)     Hypercholesterolemia     Medication monitoring encounter     Vitamin D deficiency     Essential hypertension     Pulmonary emphysema, unspecified emphysema type (H)     Nicotine dependence in remission     Anxiety           History:     Mrs. Celeste Peguero is a 77 yo female wo presents to pulmonary clinic for evaluation of emphysema and likely COPD.  She originally underwent a lung cancer screening CT and was noted to have significant emphysema.  She was then referred here.  On review she does note that she has STORY, but this is mostly with extreme exertion.  She doesn't really note any significant wheezing or chest tightness.  She does note that high humidity and extreme cold temperatures make her breathing worse.  She does have a >30 pk/yr smoking history, but quit in 2010.  She has not had any history of exacerbations or any history of use of prednisone or antibiotics.  She does get a yearly influenza vaccination.    She was started on Incruse by Dr. Gaines as spririva was not on her formulary and she developed a severe anaphylactoid reaction with lip and tongue swelling.  She has subsequently been maintained on symbicort two times a day.  And albuterol as needed.    Since being seen last, she has been doing well.  Her breathing is very stable.  She was diagnosed with acute bronchitis early October.  She was prescribed a Z-michelle and prednisone.  Since then her cough is much improved, but she is still making mucus daily.  She also notes her energy is quite low.  She is easily fatigued.  She has been using guaifenesin but does not feel it has been helpful.  No fevers or chills.  No chest tightness.  She denies any hemoptysis.      Past Medical History:   Diagnosis Date     Acute H.  pylori gastric ulcer 2010    Treated with no further Sx     Anxiety and depression     Stabilized on prozac 20mg qday.  Uses prn lorazepam.     Hiatal hernia      HTN (hypertension)      Hyponatremia     severe/ hospitalized when Tx for H. pylori/ ulcer.  No longer on HCTZ.     Macular degeneration     Wet with submacular hem R eye.  Avastin shots given x4 8/11 - 1/12.     Osteoporosis     4/13 DEXA S-1.9/F-3.5.  alendronate for two years, then teeth fell out.  Prolia x2 infections,then Reclast started 12/13.  No fracture Hx.       Shingles 2009    Thorasic, no post herpetic neuralgia.     Past Surgical History:   Procedure Laterality Date     TONSILECTOMY, ADENOIDECTOMY, BILATERAL MYRINGOTOMY AND TUBES      No other surgeries.       Social History     Social History     Marital status:      Spouse name: N/A     Number of children: N/A     Years of education: N/A     Occupational History     Not on file.     Social History Main Topics     Smoking status: Former Smoker     Types: Cigarettes     Quit date: 12/5/2010     Smokeless tobacco: Never Used     Alcohol use No     Drug use: No     Sexual activity: No     Other Topics Concern     Not on file     Social History Narrative    L[facility] at [facility]. [place].  Has Spaniel  dog.   with COPD.     Family History   Problem Relation Age of Onset     Stroke Maternal Grandmother      Allergies   Allergen Reactions     Sulfa (Sulfonamide Antibiotics) Diarrhea and Nausea Only     Also rash, throat swelling     Incruse Ellipta [Umeclidinium] Angioedema     Mouth and tongue swelling     She previously worked as a PT.    Review of Systems - 10 point review of systems negative except what is mentioned in the HPI.        Medications:     Current Outpatient Prescriptions on File Prior to Visit   Medication Sig Dispense Refill     amLODIPine (NORVASC) 10 MG tablet Take 1 tablet (10 mg total) by mouth daily. 90 tablet 1     azithromycin (ZITHROMAX) 250  MG tablet Take two pills the first day and then one pill a day for four more days. 6 tablet 1     calcium carbonate (OS-NATALEE) 600 mg (1,500 mg) tablet Take 600 mg by mouth daily.        cholecalciferol, vitamin D3, (VITAMIN D3) 2,000 unit Tab Once a day 100 each 3     FLUoxetine (PROZAC) 20 MG capsule TAKE 1 CAPSULE BY MOUTH ONE TIME DAILY 90 capsule 3     ibuprofen (ADVIL,MOTRIN) 200 MG tablet Take 200 mg by mouth every 6 (six) hours as needed for pain (takes 2-3 tablets twice a day for joint aches).       lisinopril (PRINIVIL,ZESTRIL) 40 MG tablet Take 1 tablet (40 mg total) by mouth daily. 90 tablet 3     LORazepam (ATIVAN) 0.5 MG tablet TAKE 1 TABLET BY MOUTH TWICE A DAY AS NEEDED FOR ANXIETY. 30 tablet 3     predniSONE (DELTASONE) 20 MG tablet Take 1 pill a day for 5 days. 5 tablet 1     SODIUM CL/POTASSIUM CHLORIDE (THERMOTABS ORAL) Take 2 tablets by mouth daily.        vitamin A-vitamin C-vit E-min (OCUVITE) Tab tablet Take by mouth daily.       vitamin C-vitamin E (CRANBERRY CONCENTRATE) cap Take 2 tablets by mouth once daily.       [DISCONTINUED] albuterol (PROAIR HFA;PROVENTIL HFA;VENTOLIN HFA) 90 mcg/actuation inhaler Inhale 2 puffs every 6 (six) hours as needed for wheezing. 1 each 6     [DISCONTINUED] budesonide-formoterol (SYMBICORT) 160-4.5 mcg/actuation inhaler Inhale 2 puffs 2 (two) times a day. 1 Inhaler 11     No current facility-administered medications on file prior to visit.            Exam/Data:   Vitals  Vitals:    11/07/18 1107   BP: 126/68   Pulse: 79   Resp: 19   SpO2: 92%       EXAM:  GEN: Alert and oriented x3, NAD  HEENT: Nares patent, posterior oropharynx clear.  RESPIRATORY: CTAB but with greatly diminished breath sounds, occasional sonorous wheeze.  No rales.  CARDIOVASCULAR: RRR, no m/r/g  GASTROINTESTINAL: Round, soft, NT/ND  HEM/ONC: no adenopathy, no ecchymosis  MSK: no clubbing.  Ambulates normally  NEURO: cranial nerves appear grossly intact, muscle strength equal  SKIN: no rash  or ulcerations    DATA    PFT DATA:  SPIROMETRY  FVC     1.54L   (57%)  FEV1   0.67L   (32%)  Ratio   44    Post BD there is a significant bronchodilator response    LUNG VOLUMES  TLC    11.82L  (235%)  RV  9.95L   (450%)     DIFFUSION  DLCOc 4.43 mL/min/mmHg   (22%)    OVERALL IMPRESSION  There is very severe obstructive ventilatory defect.  There is a significant bronchodilator effect.  There is significant air-trapping and hyperinflation.  There is severe reduction in diffusing capacity when corrected for hemoglobin.    IMAGING:   There is significant emphysematous changes on CT.  Personally reviewed.     [facility]  LOW DOSE LUNG CANCER SCREENING CT CHEST  11/9/2017 10:03 AM     INDICATION: Lung cancer screening. 30 pack year smoking history and nicotine dependence. Increased shortness of breath.  TECHNIQUE: Low-dose lung cancer screening noncontrast CT chest. Dose reduction techniques were used.   COMPARISON: Limited CT chest 1/13/2015     FINDINGS:  LUNGS AND PLEURA: Moderate to severe centrilobular emphysema. Right middle lobe subtle interval bronchiectasis with fibroatelectasis. A few additional scattered linear parenchymal bands of subsegmental atelectasis/fibrosis. Stable benign 2 mm   pleural-based nodular opacity left lower lobe image 216.     MEDIASTINUM: No adenopathy. No pleural or pericardial effusion.     CORONARY ARTERY CALCIFICATION: Mild.     LIMITED UPPER ABDOMEN: Probable benign hepatic cysts.     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     IMPRESSION:   CONCLUSION:  1.  Stable benign 2 mm left lower lobe nodule with no additional suspicious nodules.  2.  Moderate to severe centrilobular emphysema.  3.  Mild right middle lobe bronchiectasis with associated mild fibroatelectasis redemonstrated.     RADIOLOGIST RECOMMENDATION: Recommend annual low-dose lung cancer screening CT if clinically appropriate.     LungRADS CATEGORY: 1: Negative.     SIGNIFICANT  INCIDENTAL FINDINGS: Moderate to severe centrilobular emphysema.    Assessment/Plan:   Celeste Peguero is a 78 y.o. female with PMH significant HTN, tobacco use, and emphysema with COPD, recent increased sputum production.    1. COPD, GOLD class B, stage IV:  Her COPD is very stable, and she has not had any previous exacerbations.  She does have exertional dyspnea.  She had a significant allergic reaction reaction to Incruse.  I would be hesitant to try her on spiriva as it may be a class effect.  I would also be hesitant to use Breo as it may be a carrier molecule.  I put her on Symbicort 160 mcg, 2 puffs BID and she feels quite well.  She has already quit smoking.  She is up to date on her vaccines.  She would like to go to pulmonary rehab, but would not have a steady ride for transportation.  I did refer her to the [employer]LA website, lung.org, and she will review this with her son.      2. Subacute Bronchitis:  She continues to have some respiratory symptoms and mucus production from her recent bout of acute bronchitis.  She has tried conservative therapy, and would like to try some low dose prednisone.  I think this is reasonable, but did discuss with her that it will likely resolve in the next 2-4 weeks.  We will do prednisone 5 mg daily, but this can be stopped if the mucus production resolves.  I will have her come back in 6 weeks to re-evaluate.    3. Pulmonary nodule:  This is very small.  Given her history, we will follow this up in 1 year.  I don't think she would tolerate any surgical procedures.  I will follow this for 1 year, and if stable, no further CT scans for lung cancer screening.    Recommend:  - symbicort 2 puffs two times a day  - prednisone 5 mg daily for 6-8 weeks  - 6 weeks RTC    25 minutes spent in consultation and >50% time spent face-to-face.      Nikos Finnegan,

## 2021-06-22 NOTE — PROGRESS NOTES
Pulmonary Follow Up Note  12/19/2018    Referring Physician: Dr. Gaines  Reason for Follow Up: COPD      Problem List:     Patient Active Problem List   Diagnosis     Macular degeneration     Osteoporosis     HTN (hypertension)     Hypercholesterolemia     Medication monitoring encounter     Vitamin D deficiency     Essential hypertension     Pulmonary emphysema, unspecified emphysema type (H)     Nicotine dependence in remission     Anxiety           History:     Mrs. Celeste Peguero is a 79 yo female wo presents to pulmonary clinic for evaluation of emphysema and likely COPD.  She originally underwent a lung cancer screening CT and was noted to have significant emphysema.  She was then referred here.  On review she does note that she has STORY, but this is mostly with extreme exertion.  She doesn't really note any significant wheezing or chest tightness.  She does note that high humidity and extreme cold temperatures make her breathing worse.  She does have a >30 pk/yr smoking history, but quit in 2010.  She has not had any history of exacerbations or any history of use of prednisone or antibiotics.  She does get a yearly influenza vaccination.    She was started on Incruse by Dr. Gaines as spririva was not on her formulary and she developed a severe anaphylactoid reaction with lip and tongue swelling.  She has subsequently been maintained on symbicort two times a day.  And albuterol as needed.    Since being seen last, she has been doing well.  Her breathing is very stable.  She was prescribed symbicort two times a day which she has tolerated well, but she notes it is quite expensive and she cannot really afford it.  She denies any chest tightness, increased cough, voice hoarseness, thrush symptoms.  She has had no significant side effects from the low dose prednisone.    Past Medical History:   Diagnosis Date     Acute H. pylori gastric ulcer 2010    Treated with no further Sx     Anxiety and depression      Stabilized on prozac 20mg qday.  Uses prn lorazepam.     Hiatal hernia      HTN (hypertension)      Hyponatremia     severe/ hospitalized when Tx for H. pylori/ ulcer.  No longer on HCTZ.     Macular degeneration     Wet with submacular hem R eye.  Avastin shots given x4  - .     Osteoporosis      DEXA S-1.9/F-3.5.  alendronate for two years, then teeth fell out.  Prolia x2 infections,then Reclast started .  No fracture Hx.       Shingles 2009    Thorasic, no post herpetic neuralgia.     Past Surgical History:   Procedure Laterality Date     TONSILECTOMY, ADENOIDECTOMY, BILATERAL MYRINGOTOMY AND TUBES      No other surgeries.       Social History     Socioeconomic History     Marital status:      Spouse name: Not on file     Number of children: Not on file     Years of education: Not on file     Highest education level: Not on file   Social Needs     Financial resource strain: Not on file     Food insecurity - worry: Not on file     Food insecurity - inability: Not on file     Transportation needs - medical: Not on file     Transportation needs - non-medical: Not on file   Occupational History     Not on file   Tobacco Use     Smoking status: Former Smoker     Types: Cigarettes     Last attempt to quit: 2010     Years since quittin.0     Smokeless tobacco: Never Used   Substance and Sexual Activity     Alcohol use: No     Drug use: No     Sexual activity: No   Other Topics Concern     Not on file   Social History Narrative    Lives at [facility] on [address].  Has Spaniel  dog.   with COPD.     Family History   Problem Relation Age of Onset     Stroke Maternal Grandmother      Allergies   Allergen Reactions     Sulfa (Sulfonamide Antibiotics) Diarrhea and Nausea Only     Also rash, throat swelling     Incruse Ellipta [Umeclidinium] Angioedema     Mouth and tongue swelling     She previously worked as a PT.    Review of Systems - 10 point review of systems  negative except what is mentioned in the HPI.        Medications:     Current Outpatient Medications on File Prior to Visit   Medication Sig Dispense Refill     albuterol (PROAIR HFA;PROVENTIL HFA;VENTOLIN HFA) 90 mcg/actuation inhaler Inhale 2 puffs every 6 (six) hours as needed for wheezing. 1 each 6     amLODIPine (NORVASC) 10 MG tablet Take 1 tablet (10 mg total) by mouth daily. 90 tablet 1     azithromycin (ZITHROMAX) 250 MG tablet Take two pills the first day and then one pill a day for four more days. 6 tablet 1     budesonide-formoterol (SYMBICORT) 160-4.5 mcg/actuation inhaler Inhale 2 puffs 2 (two) times a day. 1 Inhaler 11     calcium carbonate (OS-NATALEE) 600 mg (1,500 mg) tablet Take 600 mg by mouth daily.        cholecalciferol, vitamin D3, (VITAMIN D3) 2,000 unit Tab Once a day 100 each 3     FLUoxetine (PROZAC) 20 MG capsule TAKE 1 CAPSULE BY MOUTH ONE TIME DAILY 90 capsule 3     ibuprofen (ADVIL,MOTRIN) 200 MG tablet Take 200 mg by mouth every 6 (six) hours as needed for pain (takes 2-3 tablets twice a day for joint aches).       lisinopril (PRINIVIL,ZESTRIL) 40 MG tablet Take 1 tablet (40 mg total) by mouth daily. 90 tablet 3     LORazepam (ATIVAN) 0.5 MG tablet TAKE 1 TABLET BY MOUTH TWICE A DAY AS NEEDED FOR ANXIETY. 30 tablet 3     predniSONE (DELTASONE) 5 MG tablet Take 1 tablet (5 mg total) by mouth daily. 60 tablet 1     SODIUM CL/POTASSIUM CHLORIDE (THERMOTABS ORAL) Take 2 tablets by mouth daily.        vitamin A-vitamin C-vit E-min (OCUVITE) Tab tablet Take by mouth daily.       vitamin C-vitamin E (CRANBERRY CONCENTRATE) cap Take 2 tablets by mouth once daily.       [DISCONTINUED] predniSONE (DELTASONE) 20 MG tablet Take 1 pill a day for 5 days. 5 tablet 1     No current facility-administered medications on file prior to visit.            Exam/Data:   Vitals  Vitals:    12/19/18 1046   BP: 128/80   Pulse: 87   Resp: 10   SpO2: 93%       EXAM:  GEN: Alert and oriented x3, NAD  HEENT: Nares  patent, posterior oropharynx clear.  No thrush.  RESPIRATORY: CTAB but with greatly diminished breath sounds, no wheeze.  No rales.  CARDIOVASCULAR: RRR, no m/r/g  GASTROINTESTINAL: Round, soft, NT/ND  HEM/ONC: no adenopathy, no ecchymosis  MSK: no clubbing.  Ambulates normally  NEURO: cranial nerves appear grossly intact, muscle strength equal  SKIN: no rash or ulcerations    DATA    PFT DATA:  SPIROMETRY  FVC     1.54L   (57%)  FEV1   0.67L   (32%)  Ratio   44    Post BD there is a significant bronchodilator response    LUNG VOLUMES  TLC    11.82L  (235%)  RV  9.95L   (450%)     DIFFUSION  DLCOc 4.43 mL/min/mmHg   (22%)    OVERALL IMPRESSION  There is very severe obstructive ventilatory defect.  There is a significant bronchodilator effect.  There is significant air-trapping and hyperinflation.  There is severe reduction in diffusing capacity when corrected for hemoglobin.    IMAGING:   There is significant emphysematous changes on CT.  Personally reviewed.     [facility]  LOW DOSE LUNG CANCER SCREENING CT CHEST  11/9/2017 10:03 AM     INDICATION: Lung cancer screening. 30 pack year smoking history and nicotine dependence. Increased shortness of breath.  TECHNIQUE: Low-dose lung cancer screening noncontrast CT chest. Dose reduction techniques were used.   COMPARISON: Limited CT chest 1/13/2015     FINDINGS:  LUNGS AND PLEURA: Moderate to severe centrilobular emphysema. Right middle lobe subtle interval bronchiectasis with fibroatelectasis. A few additional scattered linear parenchymal bands of subsegmental atelectasis/fibrosis. Stable benign 2 mm   pleural-based nodular opacity left lower lobe image 216.     MEDIASTINUM: No adenopathy. No pleural or pericardial effusion.     CORONARY ARTERY CALCIFICATION: Mild.     LIMITED UPPER ABDOMEN: Probable benign hepatic cysts.     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     IMPRESSION:   CONCLUSION:  1.  Stable benign 2 mm left lower  lobe nodule with no additional suspicious nodules.  2.  Moderate to severe centrilobular emphysema.  3.  Mild right middle lobe bronchiectasis with associated mild fibroatelectasis redemonstrated.     RADIOLOGIST RECOMMENDATION: Recommend annual low-dose lung cancer screening CT if clinically appropriate.     LungRADS CATEGORY: 1: Negative.     SIGNIFICANT INCIDENTAL FINDINGS: Moderate to severe centrilobular emphysema.    Assessment/Plan:   Celeste Peguero is a 78 y.o. female with PMH significant HTN, tobacco use, and emphysema with COPD, recent increased sputum production.    1. COPD, GOLD class B, stage IV:  Her COPD is very stable, and she has not had any previous exacerbations.  She does have exertional dyspnea.  She had a significant allergic reaction reaction to Incruse.  I would be hesitant to try her on spiriva as it may be a class effect.  I would also be hesitant to use Breo as it may be a carrier molecule.  I put her on Symbicort 160 mcg, 2 puffs BID and she feels quite well, but it is quite expensive for her at >$100.  She has already quit smoking.  She is up to date on her vaccines.  She would like to go to pulmonary rehab, but would not have a steady ride for transportation.  I gave her a prescription for the symbicort as she will try to obtain this medication from [facility].  We will also continue with the prednisone 5 mg daily.    2. Pulmonary nodule:  This is very small.  Given her history, we will follow this up in 1 year.  I don't think she would tolerate any surgical procedures.  I will follow this for 1 year, and if stable, no further CT scans for lung cancer screening.    Recommend:  - symbicort 2 puffs two times a day, will try to obtain this from [facility]  - prednisone 5 mg daily  - RTC in 3 months    15 minutes spent in consultation and >50% time spent face-to-face.      Nikos Finnegan,

## 2021-06-23 NOTE — TELEPHONE ENCOUNTER
I called patient at home.  Bad weather day and she will not plan to come into clinic.    Her blood pressure is controlled in the 120s-130s over 60s-70s.  She did she is still taking 1/2-1 sodium chloride pill a day.    Patient's COPD is stable.  Inhalers and 5 mg of prednisone.  Will be following up with the pulmonologist this winter.    She has her CT scan of the chest scheduled for March 19.    She requested a refill of lorazepam but was not taking more than usual.    He did have new UTI symptoms for the last 3 days with increased frequency and burning.  She has had similar symptoms back in August 2018, treated with Cipro successfully.  She is requesting a Cipro refill.    Patient will reschedule her appointment for sometime later this winter, when weather is better.

## 2021-06-23 NOTE — TELEPHONE ENCOUNTER
Cancel today's appointment.  Put patient in my schedule for a 20-minute appointment on 11:20 AM Monday the 28th.  Appointment is for follow-up blood pressure.  Okay to use that hold spot.

## 2021-06-24 ENCOUNTER — COMMUNICATION - HEALTHEAST (OUTPATIENT)
Dept: INTERNAL MEDICINE | Facility: CLINIC | Age: 81
End: 2021-06-24

## 2021-06-24 NOTE — PROGRESS NOTES
[facility] clinic Follow Up Note    Celeste Peguero   78 y.o. female    Date of Visit: 2019    Chief Complaint   Patient presents with     5 month follow-up     Subjective  Suzanna is here for a routine follow-up of multiple medical problems.    Past history of tobacco abuse and emphysema.  No acute exacerbation.  Still on Symbicort twice a day and prednisone 5 mg a day.  She is been stable since last time she saw pulmonary medicine last December.  FEV1 0.67 at 32% at that time.  She had a Z-Pedro and prednisone for COPD exacerbation last October.    No increasing shortness of breath or new cough.    She is been walking the hallways on a daily basis.  No lower extremity edema.    No chest pain or palpitations.  No history of arrhythmia.    Hypertension has been well controlled with blood pressure around 135/70 on her checks.  No edema and no orthostasis.    Blood pressure was 128/80 when she saw pulmonary medicine 2018.  Blood pressure was 136/74 last September.    She is on amlodipine 10 mg a day.  Lisinopril 40 mg a day.    Past history of hyponatremia, avoiding diuretics.  She is taking a sodium chloride pill a full pill once a day, instead of the half tablet previous.    In September her sodium was 133 with a creatinine of 0.77.    Her dog  last year.  She now has 2 cats, Hardy and Milena, that she adopted from her .    Patient's mood is good.  Past history of depression, stable on Prozac and occasional lorazepam.    No falls.    She is taking her 1000 IU a day vitamin D supplement.  History of vitamin D deficiency.  He does have a past history of osteoporosis    Her bowels are normal and no abdominal pain.    Urinary tract infection symptoms resolved after treatment earlier this month with Cipro.    Past history medical degeneration.  She get seen next month.  Vision stable but poor.    PMHx:    Past Medical History:   Diagnosis Date     Acute H. pylori gastric ulcer      Treated with no further Sx     Anxiety and depression     Stabilized on prozac 20mg qday.  Uses prn lorazepam.     Hiatal hernia      HTN (hypertension)      Hyponatremia     severe/ hospitalized when Tx for H. pylori/ ulcer.  No longer on HCTZ.     Macular degeneration     Wet with submacular hem R eye.  Avastin shots given x4 8/11 - 1/12.     Osteoporosis     4/13 DEXA S-1.9/F-3.5.  alendronate for two years, then teeth fell out.  Prolia x2 infections,then Reclast started 12/13.  No fracture Hx.       Shingles 2009    Thorasic, no post herpetic neuralgia.     PSHx:    Past Surgical History:   Procedure Laterality Date     TONSILECTOMY, ADENOIDECTOMY, BILATERAL MYRINGOTOMY AND TUBES      No other surgeries.       Immunizations:   Immunization History   Administered Date(s) Administered     Influenza high dose, seasonal 09/14/2015, 11/08/2016, 10/16/2017, 09/25/2018     Influenza, inj, historic,unspecified 09/01/2014     Pneumo Conj 13-V (2010&after) 12/05/2014     Pneumo Polysac 23-V 03/10/2017       ROS A comprehensive review of systems was performed and was otherwise negative    Medications, allergies, and problem list were reviewed and updated    Exam  /68 (Patient Site: Right Arm, Patient Position: Sitting, Cuff Size: Adult Regular)   Pulse 72   Resp 16   Wt 123 lb (55.8 kg)   BMI 20.79 kg/m    Alert and oriented.  Good mood and affect.  Legally blind.  Able to clamp on the exam table.  Mobility better than previous.  No jaundice.  No thrush or pharyngitis.  No cervical or supra clavicular adenopathy or JVD.  Lungs with decreased breath sounds throughout consistent with emphysema but no wheezing or crackles.  Heart is regular with no murmur rub or gallop.  Abdomen soft and nontender.  No ankle edema.    Assessment/Plan  1. Essential hypertension  Controlled.  Continue current lisinopril and amlodipine.    No diuretic with her history of hyponatremia.    Continues on 1 sodium chloride pill.  Could  reduce sodium chloride supplement if blood pressure becomes higher.  She does maintain a no added salt diet.    History of polydipsia with anxiety in the past.    2. Pulmonary emphysema, unspecified emphysema type (H)  Stable.  Has done better on prednisone daily.  I did discuss with patient the risks of long-term prednisone including worsening osteoporosis.    She will be meeting with her pulmonologist next month.    History of tobacco abuse, low-dose chest CT scan scheduled for March 19.  She does plan to continue yearly CT scan for screening at this time.    3. Macular degeneration, unspecified laterality, unspecified type  Stable.  Sees ophthalmology next month    4. Vitamin D deficiency  Continue current supplement  - Vitamin D, Total (25-Hydroxy)    5. Osteoporosis, unspecified osteoporosis type, unspecified pathological fracture presence  Patient did not want to schedule a DEXA at this time.  I did discuss scheduling a DEXA in the spring.  Continue daily walking    6. Medication monitoring encounter    - Basic Metabolic Panel  - HM2(CBC w/o Differential)    History of depression, doing much better now with the cats.  Walking daily.  Continue Prozac.  Occasional Lorazepam 2-3 times a week.    History of urinary tract infections, retreat if needed.  Tolerated Cipro well previously.    Return in about 4 months (around 6/26/2019) for Recheck.   Patient Instructions   No changes in medications.    Continue daily walking.    Consider a DEXA scan for bone density measurement in the spring.    CT scan of the chest and meet with pulmonology in March as planned.    Routine follow-up with me in 4 months.    Usman Gaines MD        Current Outpatient Medications   Medication Sig Dispense Refill     albuterol (PROAIR HFA;PROVENTIL HFA;VENTOLIN HFA) 90 mcg/actuation inhaler Inhale 2 puffs every 6 (six) hours as needed for wheezing. 1 each 6     amLODIPine (NORVASC) 10 MG tablet Take 1 tablet (10 mg total) by mouth daily.  90 tablet 1     budesonide-formoterol (SYMBICORT) 160-4.5 mcg/actuation inhaler Inhale 2 puffs 2 (two) times a day. 1 Inhaler 12     calcium carbonate (OS-NATALEE) 600 mg (1,500 mg) tablet Take 600 mg by mouth daily.        cholecalciferol, vitamin D3, (VITAMIN D3) 2,000 unit Tab Once a day 100 each 3     FLUoxetine (PROZAC) 20 MG capsule TAKE 1 CAPSULE BY MOUTH ONE TIME DAILY 90 capsule 3     ibuprofen (ADVIL,MOTRIN) 200 MG tablet Take 200 mg by mouth every 6 (six) hours as needed for pain (takes 2-3 tablets twice a day for joint aches).       lisinopril (PRINIVIL,ZESTRIL) 40 MG tablet Take 1 tablet (40 mg total) by mouth daily. 90 tablet 3     LORazepam (ATIVAN) 0.5 MG tablet TAKE 1 TABLET BY MOUTH TWICE A DAY AS NEEDED FOR ANXIETY. 30 tablet 3     predniSONE (DELTASONE) 5 MG tablet Take 1 tablet (5 mg total) by mouth daily. 60 tablet 1     SODIUM CL/POTASSIUM CHLORIDE (THERMOTABS ORAL) Take 1 tablet by mouth daily.              vitamin A-vitamin C-vit E-min (OCUVITE) Tab tablet Take by mouth daily.       vitamin C-vitamin E (CRANBERRY CONCENTRATE) cap Take 2 tablets by mouth once daily.       No current facility-administered medications for this visit.      Allergies   Allergen Reactions     Sulfa (Sulfonamide Antibiotics) Diarrhea and Nausea Only     Also rash, throat swelling     Incruse Ellipta [Umeclidinium] Angioedema     Mouth and tongue swelling     Social History     Tobacco Use     Smoking status: Former Smoker     Types: Cigarettes     Last attempt to quit: 2010     Years since quittin.2     Smokeless tobacco: Never Used   Substance Use Topics     Alcohol use: No     Drug use: No

## 2021-06-24 NOTE — TELEPHONE ENCOUNTER
Refill Request  Did you contact pharmacy: Yes  Medication name:   Requested Prescriptions     Pending Prescriptions Disp Refills     amLODIPine (NORVASC) 10 MG tablet [Pharmacy Med Name: AMLODIPINE 10 MG TAB 10 TAB] 90 tablet 11     Sig: TAKE 1 TABLET (10 MG TOTAL) BY MOUTH DAILY.     Who prescribed the medication: Usman Gaines MD  Pharmacy Name and Location: [facility]   Is patient out of medication: Yes  Patient notified refills processed in 72 hours:  yes  Okay to leave a detailed message: yes

## 2021-06-24 NOTE — TELEPHONE ENCOUNTER
Refill Approved    Rx renewed per Medication Renewal Policy. Medication was last renewed on 8/15/18.    Last office visit 2/26/19    [facility] Triage/Med Refill 3/7/2019     Requested Prescriptions   Pending Prescriptions Disp Refills     amLODIPine (NORVASC) 10 MG tablet [Pharmacy Med Name: AMLODIPINE 10 MG TAB 10 TAB] 90 tablet 11     Sig: TAKE 1 TABLET (10 MG TOTAL) BY MOUTH DAILY.    Calcium-Channel Blockers Protocol Passed - 3/7/2019  1:30 PM       Passed - PCP or prescribing provider visit in past 12 months or next 3 months    Last office visit with prescriber/PCP: 2/26/2019 Usman Gaines MD OR same dept: 2/26/2019 Usman Gaines MD OR same specialty: 2/26/2019 Usman Gaines MD  Last physical: Visit date not found Last MTM visit: Visit date not found   Next visit within 3 mo: Visit date not found  Next physical within 3 mo: Visit date not found  Prescriber OR PCP: Usman Gaines MD  Last diagnosis associated with med order: 1. HTN (hypertension)  - amLODIPine (NORVASC) 10 MG tablet [Pharmacy Med Name: AMLODIPINE 10 MG TAB 10 TAB]; Take 1 tablet (10 mg total) by mouth daily.  Dispense: 90 tablet; Refill: 11    If protocol passes may refill for 12 months if within 3 months of last provider visit (or a total of 15 months).            Passed - Blood pressure filed in past 12 months    BP Readings from Last 1 Encounters:   02/26/19 140/68

## 2021-06-24 NOTE — PATIENT INSTRUCTIONS - HE
No changes in medications.    Continue daily walking.    Consider a DEXA scan for bone density measurement in the spring.    CT scan of the chest and meet with pulmonology in March as planned.    Routine follow-up with me in 4 months.

## 2021-06-25 NOTE — PROGRESS NOTES
Call from  Radiology at [facility] ([facility]).  Patient is there for CT low dose lung cancer screening, but medicare is rejecting her scan.  Reason:  Age 78 (cut off 77).  Patient will see dr. Finnegan in follow up end of March and they can discuss how to proceed.

## 2021-06-27 ENCOUNTER — HEALTH MAINTENANCE LETTER (OUTPATIENT)
Age: 81
End: 2021-06-27

## 2021-07-03 NOTE — ADDENDUM NOTE
Addendum Note by Shaye Nicolas CNP at 3/2/2018  5:04 PM     Author: Shaye Nicolas CNP Service: -- Author Type: Nurse Practitioner    Filed: 3/2/2018  5:04 PM Encounter Date: 3/2/2018 Status: Signed    : Shaye Nicolas CNP (Nurse Practitioner)    Addended by: SHAYE NICOLAS on: 3/2/2018 05:04 PM        Modules accepted: Orders

## 2021-07-03 NOTE — ADDENDUM NOTE
Addendum Note by Tom Saunders MD at 11/1/2017  3:54 PM     Author: Tom Saunders MD Service: -- Author Type: Physician    Filed: 11/1/2017  3:54 PM Encounter Date: 10/18/2017 Status: Signed    : Tom Saunders MD (Physician)    Addended by: TOM SAUNDERS on: 11/1/2017 03:54 PM        Modules accepted: Orders

## 2021-07-04 NOTE — LETTER
Letter by Usman Gaines MD at      Author: Usman Gaines MD Service: -- Author Type: --    Filed:  Encounter Date: 6/24/2021 Status: (Other)         Celeste Peguero  [address] [place] [address]             June 24, 2021         Dear Ms. Yong Peguero,    Below are the results from your recent visit:    Resulted Orders   Comprehensive Metabolic Panel   Result Value Ref Range    Sodium 141 136 - 145 mmol/L    Potassium 3.9 3.5 - 5.0 mmol/L    Chloride 103 98 - 107 mmol/L    CO2 21 (L) 22 - 31 mmol/L    Anion Gap, Calculation 17 5 - 18 mmol/L    Glucose 102 70 - 125 mg/dL    BUN 15 8 - 28 mg/dL    Creatinine 0.82 0.60 - 1.10 mg/dL    GFR MDRD Af Amer >60 >60 mL/min/1.73m2    GFR MDRD Non Af Amer >60 >60 mL/min/1.73m2    Bilirubin, Total 0.5 0.0 - 1.0 mg/dL    Calcium 9.0 8.5 - 10.5 mg/dL    Protein, Total 6.7 6.0 - 8.0 g/dL    Albumin 4.1 3.5 - 5.0 g/dL    Alkaline Phosphatase 52 45 - 120 U/L    AST 16 0 - 40 U/L    ALT 10 0 - 45 U/L    Narrative    Fasting Glucose reference range is 70-99 mg/dL per  American Diabetes Association (ADA) guidelines.   HM2(CBC w/o Differential)   Result Value Ref Range    WBC 7.7 4.0 - 11.0 thou/uL    RBC 4.37 3.80 - 5.40 mill/uL    Hemoglobin 13.2 12.0 - 16.0 g/dL    Hematocrit 41.4 35.0 - 47.0 %    MCV 95 80 - 100 fL    MCH 30.2 27.0 - 34.0 pg    MCHC 31.9 (L) 32.0 - 36.0 g/dL    RDW 14.4 11.0 - 14.5 %    Platelets 236 140 - 440 thou/uL    MPV 10.4 (H) 7.0 - 10.0 fL   Vitamin D, Total (25-Hydroxy)   Result Value Ref Range    Vitamin D, Total (25-Hydroxy) 21.3 (L) 30.0 - 80.0 ng/mL    Narrative    Deficiency <10.0 ng/mL  Insufficiency 10.0-29.9 ng/mL  Sufficiency 30.0-80.0 ng/mL  Toxicity (possible) >100.0 ng/mL       Vitamin D level is again low.  Make sure you are taking the 2000 IU a day vitamin D supplement.  If you are already taking that daily, I would recommend increase of the vitamin D supplement to 4000 IU a day.    Kidney and liver tests are  normal.  Blood sugar is normal.    Hemoglobin and blood counts normal.    Please call with questions or contact us using [facility].    Sincerely,        Electronically signed by Usman Gaines MD

## 2021-07-10 ENCOUNTER — DOCUMENTATION ONLY (OUTPATIENT)
Dept: OTHER | Facility: CLINIC | Age: 81
End: 2021-07-10

## 2021-08-13 DIAGNOSIS — J43.9 PULMONARY EMPHYSEMA, UNSPECIFIED EMPHYSEMA TYPE (H): ICD-10-CM

## 2021-08-14 RX ORDER — PREDNISONE 20 MG/1
TABLET ORAL
Qty: 6 TABLET | Refills: 0 | Status: SHIPPED | OUTPATIENT
Start: 2021-08-14 | End: 2021-09-17

## 2021-09-09 DIAGNOSIS — R53.83 FATIGUE: ICD-10-CM

## 2021-09-09 DIAGNOSIS — F41.9 ANXIETY: Primary | ICD-10-CM

## 2021-09-13 PROBLEM — J44.9 COPD (CHRONIC OBSTRUCTIVE PULMONARY DISEASE) (H): Status: ACTIVE | Noted: 2017-10-16

## 2021-09-17 ENCOUNTER — OFFICE VISIT (OUTPATIENT)
Dept: PULMONOLOGY | Facility: OTHER | Age: 81
End: 2021-09-17
Payer: MEDICARE

## 2021-09-17 VITALS
OXYGEN SATURATION: 89 % | BODY MASS INDEX: 18.7 KG/M2 | SYSTOLIC BLOOD PRESSURE: 124 MMHG | DIASTOLIC BLOOD PRESSURE: 62 MMHG | WEIGHT: 108.1 LBS | HEART RATE: 64 BPM

## 2021-09-17 DIAGNOSIS — J44.9 COPD, GROUP B, BY GOLD 2017 CLASSIFICATION (H): Primary | ICD-10-CM

## 2021-09-17 PROCEDURE — 99214 OFFICE O/P EST MOD 30 MIN: CPT | Performed by: INTERNAL MEDICINE

## 2021-09-17 RX ORDER — PREDNISONE 20 MG/1
40 TABLET ORAL DAILY
Qty: 30 TABLET | Refills: 6 | Status: SHIPPED | OUTPATIENT
Start: 2021-09-17 | End: 2022-04-21

## 2021-09-17 NOTE — PROGRESS NOTES
Pulmonary Clinic Follow-up Visit    Impression: Celeste Peguero is an 81 y.o. female with PMH significant HTN, tobacco use, and emphysema with COPD GOLD class B, here for annual follow up. Doing well with good exercise tolerance and 1 minor exacerbation in the past year treated as outpatient.      Recommendations:  - continue Symbicort high dose, she can reduce to 1 puff bid with spacer. Rinse/gargle after use.  - reviewed action plan and prescribed prednisone 40mg tablets for her to use for COPD exacerbation. She'll call me to let me know if she's starting her action plan.  - continue albuterol as needed  - encouraged her to exercise and remain active  - UTD with pneumococcal vaccines as well as covid-19 vaccine. She should get the flu shot this Fall.   - no indication for add'l LDCT lung cancer screening given her age.      All questions anwered.  Follow up in 1 year.    Ayden Duenas MD ([facility]vi)  [facility]  Pulmonary & Critical Care  Pager [phone]  Clinic [phone]  Fax [phone]      CCx: COPD follow up    HPI: Interim history: I last saw Celeste on 8/4/20 on a virtual visit.  Since that time, she reports she's generally been doing well.  She helps take care of her 100 year old mother.  She is legally blind.  She required azithromycin and prednisone a few months ago.  Taking symbicort with spacer, no concerns about current inhaler regimen.     ROS:  A 12-system review was obtained and was negative with the exception of the symptoms endorsed in the history of present illness.    PMH:  Past Medical History:   Diagnosis Date     Acute H. pylori gastric ulcer 2010    Treated with no further Sx     Anxiety and depression     Stabilized on prozac 20mg qday.  Uses prn lorazepam.     Hiatal hernia      HTN (hypertension)      Hyponatremia     severe/ hospitalized when Tx for H. pylori/ ulcer.  No longer on HCTZ.     Macular degeneration     Wet with submacular hem R eye.  Avastin  shots given x4 8/11 - 1/12.     Osteoporosis     4/13 DEXA S-1.9/F-3.5.  alendronate for two years, then teeth fell out.  Prolia x2 infections,then Reclast started 12/13.  No fracture Hx.       Shingles 2009    Thorasic, no post herpetic neuralgia.       PSH:  Past Surgical History:   Procedure Laterality Date     TONSILLECTOMY, ADENOIDECTOMY, MYRINGOTOMY, INSERT TUBE BILATERAL, COMBINED      No other surgeries.         Allergies:  Allergies   Allergen Reactions     Sulfa (Sulfonamide Antibiotics) [Sulfa Drugs] Diarrhea and Nausea     Also rash, throat swelling     Incruse Ellipta [Umeclidinium] Angioedema     Mouth and tongue swelling       Family HX:  Family History   Problem Relation Age of Onset     Cerebrovascular Disease Maternal Grandmother        Social Hx:  Social History     Socioeconomic History     Marital status:      Spouse name: Not on file     Number of children: Not on file     Years of education: Not on file     Highest education level: Not on file   Occupational History     Not on file   Tobacco Use     Smoking status: Former Smoker     Types: Cigarettes     Quit date: 12/5/2010     Years since quitting: 10.7     Smokeless tobacco: Never Used   Substance and Sexual Activity     Alcohol use: No     Drug use: No     Sexual activity: Never   Other Topics Concern     Not on file   Social History Narrative    Lives [facility] on [address]  Has Spaniel  dog.   with COPD.     Social Determinants of Health     Financial Resource Strain:      Difficulty of Paying Living Expenses:    Food Insecurity:      Worried About Running Out of Food in the Last Year:      Ran Out of Food in the Last Year:    Transportation Needs:      Lack of Transportation (Medical):      Lack of Transportation (Non-Medical):    Physical Activity:      Days of Exercise per Week:      Minutes of Exercise per Session:    Stress:      Feeling of Stress :    Social Connections:      Frequency of  Communication with Friends and Family:      Frequency of Social Gatherings with Friends and Family:      Attends Anglican Services:      Active Member of Clubs or Organizations:      Attends Club or Organization Meetings:      Marital Status:    Intimate Partner Violence:      Fear of Current or Ex-Partner:      Emotionally Abused:      Physically Abused:      Sexually Abused:        Current Meds:  Current Outpatient Medications   Medication Sig Dispense Refill     albuterol (PROAIR HFA;PROVENTIL HFA;VENTOLIN HFA) 90 mcg/actuation inhaler [ALBUTEROL (PROAIR HFA;PROVENTIL HFA;VENTOLIN HFA) 90 MCG/ACTUATION INHALER] Inhale 2 puffs every 6 (six) hours as needed for wheezing. 1 each 6     amLODIPine (NORVASC) 10 MG tablet [AMLODIPINE (NORVASC) 10 MG TABLET] TAKE 1 TABLET (10 MG TOTAL) BY MOUTH DAILY. 90 tablet 3     budesonide-formoteroL (SYMBICORT) 160-4.5 mcg/actuation inhaler [BUDESONIDE-FORMOTEROL (SYMBICORT) 160-4.5 MCG/ACTUATION INHALER] Inhale 2 puffs 2 (two) times a day. 1 Inhaler 12     calcium carbonate (OS-NATALEE) 600 mg (1,500 mg) tablet [CALCIUM CARBONATE (OS-NATALEE) 600 MG (1,500 MG) TABLET] Take 600 mg by mouth daily.        cholecalciferol, vitamin D3, (VITAMIN D3) 2,000 unit Tab [CHOLECALCIFEROL, VITAMIN D3, (VITAMIN D3) 2,000 UNIT TAB] Once a day 100 each 3     Cranberry-Vitamin C-Vitamin E 140-100-3 MG-MG-UNIT CAPS Take 2 tablets by mouth daily       FLUoxetine (PROZAC) 20 MG capsule [FLUOXETINE (PROZAC) 20 MG CAPSULE] TAKE 1 CAPSULE BY MOUTH ONE TIME DAILY 90 capsule 3     ibuprofen (ADVIL,MOTRIN) 200 MG tablet [IBUPROFEN (ADVIL,MOTRIN) 200 MG TABLET] Take 200 mg by mouth every 6 (six) hours as needed for pain (takes 2-3 tablets twice a day for joint aches).       lisinopriL (PRINIVIL,ZESTRIL) 40 MG tablet [LISINOPRIL (PRINIVIL,ZESTRIL) 40 MG TABLET] TAKE 1 TABLET (40 MG TOTAL) BY MOUTH DAILY. 90 tablet 2     LORazepam (ATIVAN) 0.5 MG tablet [LORAZEPAM (ATIVAN) 0.5 MG TABLET] Take 1 tablet (0.5 mg total)  by mouth 2 (two) times a day as needed for anxiety. 60 tablet 0     ondansetron (ZOFRAN ODT) 4 MG disintegrating tablet [ONDANSETRON (ZOFRAN ODT) 4 MG DISINTEGRATING TABLET] Take 1 tablet (4 mg total) by mouth every 8 (eight) hours as needed for nausea. 30 tablet 1     predniSONE (DELTASONE) 20 MG tablet Take 2 tablets (40 mg) by mouth daily 30 tablet 6     sodium chloride 1 gram tablet [SODIUM CHLORIDE 1 GRAM TABLET] Take 1 tablet (1 g total) by mouth daily. 90 tablet 3     vitamin A-vitamin C-vit E-min (OCUVITE) Tab tablet [VITAMIN A-VITAMIN C-VIT E-MIN (OCUVITE) TAB TABLET] Take by mouth daily.         Physical Exam:  /62   Pulse 64   Wt 49 kg (108 lb 1.6 oz)   SpO2 (!) 89%   BMI 18.70 kg/m    Gen: awake, alert, oriented, no distress  HEENT: nasal turbinates are unremarkable, no oropharyngeal lesions, no cervical or supraclavicular lymphadenopathy  CV: RRR, no M/G/R  Resp: diminished air entry bilaterally without wheezing or rhonchi.   Skin: no apparent rashes  Ext: no cyanosis, clubbing or edema  Neuro: alert, nonfocal    Labs:  Reviewed  Chem panel unremarkable  CBC OK, no diff    Imaging studies:  LDCT from 2017    IMPRESSION:  CONCLUSION:  1.  Stable benign 2 mm left lower lobe nodule with no additional suspicious nodules.  2.  Moderate to severe centrilobular emphysema.  3.  Mild right middle lobe bronchiectasis with associated mild fibroatelectasis redemonstrated.    Pulmonary Function Testing  PFTs 2017  FEV1/FVC is 44 and is reduced.  FEV1 is 32% predicted and is reduced.  FVC is 57% predicted and reduced.  There was improvement in spirometry after a single inhaled dose of bronchodilator.  Flow volume loop shows scooping indicating obstruction  TLC is 235% predicted and is increased.  RV is 450% predicted and is increased.  DLCO is 22% predicted and is reduced when it   is corrected for hemoglobin.

## 2021-09-17 NOTE — LETTER
9/17/2021         RE: Celeste Peguero  [address] [place] [address] [address] [place] [address]        Dear Colleague,    Thank you for referring your patient, Celeste Peguero, to the [facility]. Please see a copy of my visit note below.    Pulmonary Clinic Follow-up Visit    Impression: Celeste Peguero is an 81 y.o. female with PMH significant HTN, tobacco use, and emphysema with COPD GOLD class B, here for annual follow up. Doing well with good exercise tolerance and 1 minor exacerbation in the past year treated as outpatient.      Recommendations:  - continue Symbicort high dose, she can reduce to 1 puff bid with spacer. Rinse/gargle after use.  - reviewed action plan and prescribed prednisone 40mg tablets for her to use for COPD exacerbation. She'll call me to let me know if she's starting her action plan.  - continue albuterol as needed  - encouraged her to exercise and remain active  - UTD with pneumococcal vaccines as well as covid-19 vaccine. She should get the flu shot this Fall.   - no indication for add'l LDCT lung cancer screening given her age.      All questions anwered.  Follow up in 1 year.    Ayden Duenas MD (Avi)  [facility] Pulmonary & Critical Care  Pager [phone]  Clinic [phone]  Fax [phone]      CCx: COPD follow up    HPI: Interim history: I last saw Celeste on 8/4/20 on a virtual visit.  Since that time, she reports she's generally been doing well.  She helps take care of her 100 year old mother.  She is legally blind.  She required azithromycin and prednisone a few months ago.  Taking symbicort with spacer, no concerns about current inhaler regimen.     ROS:  A 12-system review was obtained and was negative with the exception of the symptoms endorsed in the history of present illness.    PMH:  Past Medical History:   Diagnosis Date     Acute H. pylori gastric ulcer 2010    Treated with no further Sx     Anxiety  and depression     Stabilized on prozac 20mg qday.  Uses prn lorazepam.     Hiatal hernia      HTN (hypertension)      Hyponatremia     severe/ hospitalized when Tx for H. pylori/ ulcer.  No longer on HCTZ.     Macular degeneration     Wet with submacular hem R eye.  Avastin shots given x4 8/11 - 1/12.     Osteoporosis     4/13 DEXA S-1.9/F-3.5.  alendronate for two years, then teeth fell out.  Prolia x2 infections,then Reclast started 12/13.  No fracture Hx.       Shingles 2009    Thorasic, no post herpetic neuralgia.       PSH:  Past Surgical History:   Procedure Laterality Date     TONSILLECTOMY, ADENOIDECTOMY, MYRINGOTOMY, INSERT TUBE BILATERAL, COMBINED      No other surgeries.         Allergies:  Allergies   Allergen Reactions     Sulfa (Sulfonamide Antibiotics) [Sulfa Drugs] Diarrhea and Nausea     Also rash, throat swelling     Incruse Ellipta [Umeclidinium] Angioedema     Mouth and tongue swelling       Family HX:  Family History   Problem Relation Age of Onset     Cerebrovascular Disease Maternal Grandmother        Social Hx:  Social History     Socioeconomic History     Marital status:      Spouse name: Not on file     Number of children: Not on file     Years of education: Not on file     Highest education level: Not on file   Occupational History     Not on file   Tobacco Use     Smoking status: Former Smoker     Types: Cigarettes     Quit date: 12/5/2010     Years since quitting: 10.7     Smokeless tobacco: Never Used   Substance and Sexual Activity     Alcohol use: No     Drug use: No     Sexual activity: Never   Other Topics Concern     Not on file   Social History Narrative    Lives [facility] on [facility]. [facility].  Has Spaniel  dog.   with COPD.     Social Determinants of Health     Financial Resource Strain:      Difficulty of Paying Living Expenses:    Food Insecurity:      Worried About Running Out of Food in the Last Year:      Ran Out of Food in the Last Year:     Transportation Needs:      Lack of Transportation (Medical):      Lack of Transportation (Non-Medical):    Physical Activity:      Days of Exercise per Week:      Minutes of Exercise per Session:    Stress:      Feeling of Stress :    Social Connections:      Frequency of Communication with Friends and Family:      Frequency of Social Gatherings with Friends and Family:      Attends Mosque Services:      Active Member of Clubs or Organizations:      Attends Club or Organization Meetings:      Marital Status:    Intimate Partner Violence:      Fear of Current or Ex-Partner:      Emotionally Abused:      Physically Abused:      Sexually Abused:        Current Meds:  Current Outpatient Medications   Medication Sig Dispense Refill     albuterol (PROAIR HFA;PROVENTIL HFA;VENTOLIN HFA) 90 mcg/actuation inhaler [ALBUTEROL (PROAIR HFA;PROVENTIL HFA;VENTOLIN HFA) 90 MCG/ACTUATION INHALER] Inhale 2 puffs every 6 (six) hours as needed for wheezing. 1 each 6     amLODIPine (NORVASC) 10 MG tablet [AMLODIPINE (NORVASC) 10 MG TABLET] TAKE 1 TABLET (10 MG TOTAL) BY MOUTH DAILY. 90 tablet 3     budesonide-formoteroL (SYMBICORT) 160-4.5 mcg/actuation inhaler [BUDESONIDE-FORMOTEROL (SYMBICORT) 160-4.5 MCG/ACTUATION INHALER] Inhale 2 puffs 2 (two) times a day. 1 Inhaler 12     calcium carbonate (OS-NATALEE) 600 mg (1,500 mg) tablet [CALCIUM CARBONATE (OS-NATALEE) 600 MG (1,500 MG) TABLET] Take 600 mg by mouth daily.        cholecalciferol, vitamin D3, (VITAMIN D3) 2,000 unit Tab [CHOLECALCIFEROL, VITAMIN D3, (VITAMIN D3) 2,000 UNIT TAB] Once a day 100 each 3     Cranberry-Vitamin C-Vitamin E 140-100-3 MG-MG-UNIT CAPS Take 2 tablets by mouth daily       FLUoxetine (PROZAC) 20 MG capsule [FLUOXETINE (PROZAC) 20 MG CAPSULE] TAKE 1 CAPSULE BY MOUTH ONE TIME DAILY 90 capsule 3     ibuprofen (ADVIL,MOTRIN) 200 MG tablet [IBUPROFEN (ADVIL,MOTRIN) 200 MG TABLET] Take 200 mg by mouth every 6 (six) hours as needed for pain (takes 2-3 tablets twice  a day for joint aches).       lisinopriL (PRINIVIL,ZESTRIL) 40 MG tablet [LISINOPRIL (PRINIVIL,ZESTRIL) 40 MG TABLET] TAKE 1 TABLET (40 MG TOTAL) BY MOUTH DAILY. 90 tablet 2     LORazepam (ATIVAN) 0.5 MG tablet [LORAZEPAM (ATIVAN) 0.5 MG TABLET] Take 1 tablet (0.5 mg total) by mouth 2 (two) times a day as needed for anxiety. 60 tablet 0     ondansetron (ZOFRAN ODT) 4 MG disintegrating tablet [ONDANSETRON (ZOFRAN ODT) 4 MG DISINTEGRATING TABLET] Take 1 tablet (4 mg total) by mouth every 8 (eight) hours as needed for nausea. 30 tablet 1     predniSONE (DELTASONE) 20 MG tablet Take 2 tablets (40 mg) by mouth daily 30 tablet 6     sodium chloride 1 gram tablet [SODIUM CHLORIDE 1 GRAM TABLET] Take 1 tablet (1 g total) by mouth daily. 90 tablet 3     vitamin A-vitamin C-vit E-min (OCUVITE) Tab tablet [VITAMIN A-VITAMIN C-VIT E-MIN (OCUVITE) TAB TABLET] Take by mouth daily.         Physical Exam:  /62   Pulse 64   Wt 49 kg (108 lb 1.6 oz)   SpO2 (!) 89%   BMI 18.70 kg/m    Gen: awake, alert, oriented, no distress  HEENT: nasal turbinates are unremarkable, no oropharyngeal lesions, no cervical or supraclavicular lymphadenopathy  CV: RRR, no M/G/R  Resp: diminished air entry bilaterally without wheezing or rhonchi.   Skin: no apparent rashes  Ext: no cyanosis, clubbing or edema  Neuro: alert, nonfocal    Labs:  Reviewed  Chem panel unremarkable  CBC OK, no diff    Imaging studies:  LDCT from 2017    IMPRESSION:  CONCLUSION:  1.  Stable benign 2 mm left lower lobe nodule with no additional suspicious nodules.  2.  Moderate to severe centrilobular emphysema.  3.  Mild right middle lobe bronchiectasis with associated mild fibroatelectasis redemonstrated.    Pulmonary Function Testing  PFTs 2017  FEV1/FVC is 44 and is reduced.  FEV1 is 32% predicted and is reduced.  FVC is 57% predicted and reduced.  There was improvement in spirometry after a single inhaled dose of bronchodilator.  Flow volume loop shows scooping  indicating obstruction  TLC is 235% predicted and is increased.  RV is 450% predicted and is increased.  DLCO is 22% predicted and is reduced when it   is corrected for hemoglobin.      Again, thank you for allowing me to participate in the care of your patient.        Sincerely,        Ayden Duenas MD

## 2021-10-16 ENCOUNTER — HEALTH MAINTENANCE LETTER (OUTPATIENT)
Age: 81
End: 2021-10-16

## 2021-11-12 DIAGNOSIS — I10 HTN (HYPERTENSION): ICD-10-CM

## 2021-11-12 RX ORDER — LISINOPRIL 40 MG/1
40 TABLET ORAL DAILY
Qty: 90 TABLET | Refills: 2 | Status: SHIPPED | OUTPATIENT
Start: 2021-11-12 | End: 2022-07-28

## 2021-11-12 NOTE — TELEPHONE ENCOUNTER
Refill Request  Medication name: Pending Prescriptions:                       Disp   Refills    lisinopril (ZESTRIL) 40 MG tablet         90 tab*2            Sig: Take 1 tablet (40 mg) by mouth daily    Who prescribed the medication: CHIP   Last refill on medication: 10/23/2020  Requested [facility]  Last appointment with PCP: 06/24/2021  Next appointment: Appointment scheduled for 11/18/2021

## 2021-11-18 ENCOUNTER — OFFICE VISIT (OUTPATIENT)
Dept: INTERNAL MEDICINE | Facility: CLINIC | Age: 81
End: 2021-11-18
Payer: MEDICARE

## 2021-11-18 VITALS
OXYGEN SATURATION: 96 % | HEART RATE: 80 BPM | DIASTOLIC BLOOD PRESSURE: 64 MMHG | WEIGHT: 110 LBS | BODY MASS INDEX: 19.03 KG/M2 | SYSTOLIC BLOOD PRESSURE: 152 MMHG

## 2021-11-18 DIAGNOSIS — H35.30 MACULAR DEGENERATION (SENILE) OF RETINA: ICD-10-CM

## 2021-11-18 DIAGNOSIS — I10 ESSENTIAL HYPERTENSION: ICD-10-CM

## 2021-11-18 DIAGNOSIS — E87.1 HYPONATREMIA: ICD-10-CM

## 2021-11-18 DIAGNOSIS — E55.9 VITAMIN D DEFICIENCY: ICD-10-CM

## 2021-11-18 DIAGNOSIS — F41.9 ANXIETY: ICD-10-CM

## 2021-11-18 DIAGNOSIS — R53.83 FATIGUE, UNSPECIFIED TYPE: ICD-10-CM

## 2021-11-18 DIAGNOSIS — J43.9 PULMONARY EMPHYSEMA, UNSPECIFIED EMPHYSEMA TYPE (H): Primary | ICD-10-CM

## 2021-11-18 DIAGNOSIS — Z51.81 ENCOUNTER FOR THERAPEUTIC DRUG MONITORING: ICD-10-CM

## 2021-11-18 LAB
ALBUMIN SERPL-MCNC: 3.9 G/DL (ref 3.5–5)
ALP SERPL-CCNC: 44 U/L (ref 45–120)
ALT SERPL W P-5'-P-CCNC: 11 U/L (ref 0–45)
ANION GAP SERPL CALCULATED.3IONS-SCNC: 12 MMOL/L (ref 5–18)
AST SERPL W P-5'-P-CCNC: 14 U/L (ref 0–40)
BILIRUB SERPL-MCNC: 0.6 MG/DL (ref 0–1)
BUN SERPL-MCNC: 15 MG/DL (ref 8–28)
CALCIUM SERPL-MCNC: 9.4 MG/DL (ref 8.5–10.5)
CHLORIDE BLD-SCNC: 100 MMOL/L (ref 98–107)
CO2 SERPL-SCNC: 27 MMOL/L (ref 22–31)
CREAT SERPL-MCNC: 0.85 MG/DL (ref 0.6–1.1)
ERYTHROCYTE [DISTWIDTH] IN BLOOD BY AUTOMATED COUNT: 14.6 % (ref 10–15)
GFR SERPL CREATININE-BSD FRML MDRD: 64 ML/MIN/1.73M2
GLUCOSE BLD-MCNC: 101 MG/DL (ref 70–125)
HCT VFR BLD AUTO: 41.9 % (ref 35–47)
HGB BLD-MCNC: 13.5 G/DL (ref 11.7–15.7)
MCH RBC QN AUTO: 30.4 PG (ref 26.5–33)
MCHC RBC AUTO-ENTMCNC: 32.2 G/DL (ref 31.5–36.5)
MCV RBC AUTO: 94 FL (ref 78–100)
PLATELET # BLD AUTO: 320 10E3/UL (ref 150–450)
POTASSIUM BLD-SCNC: 4 MMOL/L (ref 3.5–5)
PROT SERPL-MCNC: 6.4 G/DL (ref 6–8)
RBC # BLD AUTO: 4.44 10E6/UL (ref 3.8–5.2)
SODIUM SERPL-SCNC: 139 MMOL/L (ref 136–145)
TSH SERPL DL<=0.005 MIU/L-ACNC: 3.64 UIU/ML (ref 0.3–5)
WBC # BLD AUTO: 10.1 10E3/UL (ref 4–11)

## 2021-11-18 PROCEDURE — 80053 COMPREHEN METABOLIC PANEL: CPT | Performed by: INTERNAL MEDICINE

## 2021-11-18 PROCEDURE — 85027 COMPLETE CBC AUTOMATED: CPT | Performed by: INTERNAL MEDICINE

## 2021-11-18 PROCEDURE — 36415 COLL VENOUS BLD VENIPUNCTURE: CPT | Performed by: INTERNAL MEDICINE

## 2021-11-18 PROCEDURE — 99214 OFFICE O/P EST MOD 30 MIN: CPT | Performed by: INTERNAL MEDICINE

## 2021-11-18 PROCEDURE — 84443 ASSAY THYROID STIM HORMONE: CPT | Performed by: INTERNAL MEDICINE

## 2021-11-18 PROCEDURE — 82306 VITAMIN D 25 HYDROXY: CPT | Performed by: INTERNAL MEDICINE

## 2021-11-18 RX ORDER — AZITHROMYCIN 250 MG/1
TABLET, FILM COATED ORAL
Qty: 6 TABLET | Refills: 0 | Status: SHIPPED | OUTPATIENT
Start: 2021-11-18 | End: 2021-11-23

## 2021-11-18 RX ORDER — LORAZEPAM 0.5 MG/1
0.5 TABLET ORAL 2 TIMES DAILY PRN
Qty: 60 TABLET | Refills: 0 | Status: SHIPPED | OUTPATIENT
Start: 2021-11-18 | End: 2022-04-22

## 2021-11-18 NOTE — PROGRESS NOTES
[facility] clinic Follow Up Note    Celeste VELÁZQUEZ Yong Peguero   81 year old female    Date of Visit: 11/18/2021    Chief Complaint   Patient presents with     Follow Up     5 mo follow up.     Subjective  Suzanna is an 81-year-old female with COPD, former smoker.  Chronic anxiety and severe macular degeneration, nearly blind.    She had some increased stress with her 100-year-old mother having some confusional spells earlier this fall.    Patient is back to a more normal routine, she has a small breakfast with 1 to 2 cups of coffee.  Thinks her sodium chloride in the morning without her meds.  Goes to see her mother later in the morning, has a light lunch.  Generally walks for 5 to 10 minutes 2-3 times a day disease.  No falls.    She has chronic anxiety on Prozac.  Just occasionally uses lorazepam mainly in the evening when she is more anxious, usually after listening to the news.  She tries to not listen to the news now.    No significant nausea episodes and bowels are regular.  History of hyponatremia associated with her anxiety.    Her blood pressure at home has been in the 130-150s/60s.  She denies orthostasis and no edema.  At her pulmonary clinic appointment in September blood pressure 124/62.    She is on lisinopril 40 mg a day and amlodipine 10 mg a day.    She does use ibuprofen periodically for hand arthritis mainly.  She does have a distant history of ulcer but no recent epigastric pain.  No blood in stool.    Past history of vitamin D deficiency and is taking the 2000 IU a day.  Vitamin D level 21 in June.    Macular degeneration but up-to-date with the ophthalmologist and her injections.    No UTI symptoms.    No headache complaints.  No new cough.  No new problems swallowing.  No chest pain or palpitations.    Her cats,Hardy and ari are doing well    PMHx:    Past Medical History:   Diagnosis Date     Acute H. pylori gastric ulcer 2010    Treated with no further Sx     Anxiety and depression      Stabilized on prozac 20mg qday.  Uses prn lorazepam.     Hiatal hernia      HTN (hypertension)      Hyponatremia     severe/ hospitalized when Tx for H. pylori/ ulcer.  No longer on HCTZ.     Macular degeneration     Wet with submacular hem R eye.  Avastin shots given x4 8/11 - 1/12.     Osteoporosis     4/13 DEXA S-1.9/F-3.5.  alendronate for two years, then teeth fell out.  Prolia x2 infections,then Reclast started 12/13.  No fracture Hx.       Shingles 2009    Thorasic, no post herpetic neuralgia.     PSHx:    Past Surgical History:   Procedure Laterality Date     TONSILLECTOMY, ADENOIDECTOMY, MYRINGOTOMY, INSERT TUBE BILATERAL, COMBINED      No other surgeries.       Immunizations:   Immunization History   Administered Date(s) Administered     COVID-19,PF,Moderna 01/27/2021, 02/24/2021     Flu, Unspecified 09/01/2014     Influenza (High Dose) 3 valent vaccine 09/14/2015, 11/08/2016, 10/16/2017, 09/25/2018, 10/03/2019     Pneumo Conj 13-V (2010&after) 12/05/2014     Pneumococcal 23 valent 03/10/2017       ROS A comprehensive review of systems was performed and was otherwise negative    Medications, allergies, and problem list were reviewed and updated    Exam  BP (!) 152/64   Pulse 80   Wt 49.9 kg (110 lb)   SpO2 96%   BMI 19.03 kg/m    Frail elderly female.  Alert and oriented.  Good mood and affect, does not appear anxious today.  Was able to climb up on exam table without difficulty, unassisted.  Moderate kyphosis.  Reduced respiratory excursion unchanged with reduced breath sounds.  No wheezing or crackles or dullness.  Heart is regular without murmur.  Abdomen is thin nontender and no ankle edema.  The mole on her back is 0.5 cm, well-circumscribed uniform color, unchanged    Assessment/Plan  1. Pulmonary emphysema, unspecified emphysema type (H)  Severe but stable.  Not requiring oxygen.  She did not treat exacerbation likely viral URI approximately 6 weeks ago with prednisone and Z-Pedro, requesting a  refill on the Z-Pedro but already have refilled prednisone.    She confirmed her wishes for DNR.    Symbicort.  Saw pulmonary clinic in September, 1 year follow-up plan  - No CPR- Do NOT Intubate  - azithromycin (ZITHROMAX) 250 MG tablet; Take 2 tablets (500 mg) by mouth daily for 1 day, THEN 1 tablet (250 mg) daily for 4 days. In case of respiratory infection  Dispense: 6 tablet; Refill: 0    2. Essential hypertension  Controlled.  Fluctuating systolic blood pressure, diastolic tends to be low.  Denies orthostasis.  Could consider reduction in amlodipine if needed.  She was warned about ibuprofen use risk, told to avoid ibuprofen    3. Hyponatremia  History of chronic hyponatremia on sodium chloride 1 g in the morning    4. Anxiety  Chronic, with increased flare earlier this fall but has returned to baseline.  She was given warnings about lorazepam.  - LORazepam (ATIVAN) 0.5 MG tablet; Take 1 tablet (0.5 mg) by mouth 2 times daily as needed for anxiety  Dispense: 60 tablet; Refill: 0  - FLUoxetine (PROZAC) 20 MG capsule; [FLUOXETINE (PROZAC) 20 MG CAPSULE] TAKE 1 CAPSULE BY MOUTH ONE TIME DAILY  Dispense: 90 capsule; Refill: 3    5. Vitamin D deficiency  Increase if needed.  Currently 2000 IU a day  - Vitamin D Deficiency    6. Macular degeneration (senile) of retina  Up-to-date on ophthalmology follow-up    7. Encounter for therapeutic drug monitoring    - Comprehensive metabolic panel  - CBC with platelets    8. Fatigue, unspecified type  Chronic.  Normal TSH in 2019.  - TSH    She has had her flu shot and COVID-19 booster already    Mole on back stable      Return in about 4 years (around 11/18/2025) for Follow up.   Patient Instructions   No medication changes today.    You have been given a azithromycin Z-Pedro to use in case of future respiratory infection.    Try to avoid use of ibuprofen, as that does put you at risk for kidney injury and affecting blood pressure.    Make sure your blood pressure continues to  run less than 150/80.  Contact me if blood pressure is getting too high, or if it is running too low, less than 110/60 or lightheaded dizzy spells.    Use the lorazepam only for significant anxiety spells.  Lorazepam can affect memory, cause confusion and increased fall risk.    Follow-up in 4 months for routine checkup    Usman Gaines MD, MD        Current Outpatient Medications   Medication Sig Dispense Refill     albuterol (PROAIR HFA;PROVENTIL HFA;VENTOLIN HFA) 90 mcg/actuation inhaler [ALBUTEROL (PROAIR HFA;PROVENTIL HFA;VENTOLIN HFA) 90 MCG/ACTUATION INHALER] Inhale 2 puffs every 6 (six) hours as needed for wheezing. 1 each 6     amLODIPine (NORVASC) 10 MG tablet [AMLODIPINE (NORVASC) 10 MG TABLET] TAKE 1 TABLET (10 MG TOTAL) BY MOUTH DAILY. 90 tablet 3     azithromycin (ZITHROMAX) 250 MG tablet Take 2 tablets (500 mg) by mouth daily for 1 day, THEN 1 tablet (250 mg) daily for 4 days. In case of respiratory infection 6 tablet 0     budesonide-formoteroL (SYMBICORT) 160-4.5 mcg/actuation inhaler [BUDESONIDE-FORMOTEROL (SYMBICORT) 160-4.5 MCG/ACTUATION INHALER] Inhale 2 puffs 2 (two) times a day. 1 Inhaler 12     calcium carbonate (OS-NATALEE) 600 mg (1,500 mg) tablet [CALCIUM CARBONATE (OS-NATALEE) 600 MG (1,500 MG) TABLET] Take 600 mg by mouth daily.        cholecalciferol, vitamin D3, (VITAMIN D3) 2,000 unit Tab [CHOLECALCIFEROL, VITAMIN D3, (VITAMIN D3) 2,000 UNIT TAB] Once a day 100 each 3     Cranberry-Vitamin C-Vitamin E 140-100-3 MG-MG-UNIT CAPS Take 2 tablets by mouth daily       FLUoxetine (PROZAC) 20 MG capsule [FLUOXETINE (PROZAC) 20 MG CAPSULE] TAKE 1 CAPSULE BY MOUTH ONE TIME DAILY 90 capsule 3     ibuprofen (ADVIL,MOTRIN) 200 MG tablet [IBUPROFEN (ADVIL,MOTRIN) 200 MG TABLET] Take 200 mg by mouth every 6 (six) hours as needed for pain (takes 2-3 tablets twice a day for joint aches).       lisinopril (ZESTRIL) 40 MG tablet Take 1 tablet (40 mg) by mouth daily 90 tablet 2     LORazepam (ATIVAN) 0.5 MG  tablet Take 1 tablet (0.5 mg) by mouth 2 times daily as needed for anxiety 60 tablet 0     ondansetron (ZOFRAN ODT) 4 MG disintegrating tablet [ONDANSETRON (ZOFRAN ODT) 4 MG DISINTEGRATING TABLET] Take 1 tablet (4 mg total) by mouth every 8 (eight) hours as needed for nausea. 30 tablet 1     sodium chloride 1 gram tablet [SODIUM CHLORIDE 1 GRAM TABLET] Take 1 tablet (1 g total) by mouth daily. 90 tablet 3     vitamin A-vitamin C-vit E-min (OCUVITE) Tab tablet [VITAMIN A-VITAMIN C-VIT E-MIN (OCUVITE) TAB TABLET] Take by mouth daily.       predniSONE (DELTASONE) 20 MG tablet Take 2 tablets (40 mg) by mouth daily (Patient not taking: Reported on 11/18/2021) 30 tablet 6     Allergies   Allergen Reactions     Sulfa (Sulfonamide Antibiotics) [Sulfa Drugs] Diarrhea and Nausea     Also rash, throat swelling     Incruse Ellipta [Umeclidinium] Angioedema     Mouth and tongue swelling     Social History     Tobacco Use     Smoking status: Former Smoker     Types: Cigarettes     Quit date: 12/5/2010     Years since quitting: 10.9     Smokeless tobacco: Never Used   Substance Use Topics     Alcohol use: No     Drug use: No

## 2021-11-19 LAB — DEPRECATED CALCIDIOL+CALCIFEROL SERPL-MC: 29 UG/L (ref 30–80)

## 2022-01-28 DIAGNOSIS — I10 HTN (HYPERTENSION): ICD-10-CM

## 2022-01-28 DIAGNOSIS — J43.2 CENTRILOBULAR EMPHYSEMA (H): ICD-10-CM

## 2022-01-28 RX ORDER — BUDESONIDE AND FORMOTEROL FUMARATE DIHYDRATE 160; 4.5 UG/1; UG/1
2 AEROSOL RESPIRATORY (INHALATION) 2 TIMES DAILY
Qty: 10.2 G | Refills: 11 | Status: SHIPPED | OUTPATIENT
Start: 2022-01-28 | End: 2023-01-16

## 2022-01-30 NOTE — TELEPHONE ENCOUNTER
"Routing refill request to provider for review/approval because:  BP not in range.  Early refill requested.    Last Written Prescription Date:  2/25/21  Last Fill Quantity: 90,  # refills: 3   Last office visit provider:  11/18/21     Requested Prescriptions   Pending Prescriptions Disp Refills     amLODIPine (NORVASC) 10 MG tablet [Pharmacy Med Name: AMLODIPINE 10 MG TAB 10 Tablet] 90 tablet 3     Sig: TAKE 1 TABLET (10 MG TOTAL) BY MOUTH DAILY.       Calcium Channel Blockers Protocol  Failed - 1/28/2022  9:57 AM        Failed - Blood pressure under 140/90 in past 12 months     BP Readings from Last 3 Encounters:   11/18/21 (!) 152/64   09/17/21 124/62   06/23/21 126/56                 Passed - Recent (12 mo) or future (30 days) visit within the authorizing provider's specialty     Patient has had an office visit with the authorizing provider or a provider within the authorizing providers department within the previous 12 mos or has a future within next 30 days. See \"Patient Info\" tab in inbasket, or \"Choose Columns\" in Meds & Orders section of the refill encounter.              Passed - Medication is active on med list        Passed - Patient is age 18 or older        Passed - No active pregnancy on record        Passed - Normal serum creatinine on file in past 12 months     Recent Labs   Lab Test 11/18/21  1343   CR 0.85       Ok to refill medication if creatinine is low          Passed - No positive pregnancy test in past 12 months             Alex Leslie RN 01/30/22 3:15 PM  "

## 2022-01-31 RX ORDER — AMLODIPINE BESYLATE 10 MG/1
TABLET ORAL
Qty: 90 TABLET | Refills: 3 | Status: SHIPPED | OUTPATIENT
Start: 2022-01-31 | End: 2023-01-16

## 2022-02-21 ENCOUNTER — TELEPHONE (OUTPATIENT)
Dept: INTERNAL MEDICINE | Facility: CLINIC | Age: 82
End: 2022-02-21
Payer: MEDICARE

## 2022-02-21 DIAGNOSIS — J44.1 COPD EXACERBATION (H): Primary | ICD-10-CM

## 2022-02-21 RX ORDER — AZITHROMYCIN 250 MG/1
TABLET, FILM COATED ORAL
Qty: 6 TABLET | Refills: 0 | Status: SHIPPED | OUTPATIENT
Start: 2022-02-21 | End: 2022-02-26

## 2022-02-21 RX ORDER — PREDNISONE 20 MG/1
20 TABLET ORAL DAILY
Qty: 5 TABLET | Refills: 0 | Status: SHIPPED | OUTPATIENT
Start: 2022-02-21 | End: 2022-02-26

## 2022-02-21 NOTE — TELEPHONE ENCOUNTER
I received a message from the daughter-in-law that patient developed a cough consistent with cold-like symptoms but she does have COPD.  Treat COPD exacerbation with Z-Pedro and 5-day prednisone 20 mg daily.  Daughter-in-law was told to have her make a virtual appointment with me later this week if she is not improving or if she has further questions.    Suspect viral cold from her son who is just getting better now.

## 2022-03-31 ENCOUNTER — VIRTUAL VISIT (OUTPATIENT)
Dept: INTERNAL MEDICINE | Facility: CLINIC | Age: 82
End: 2022-03-31
Payer: MEDICARE

## 2022-03-31 DIAGNOSIS — E87.1 HYPONATREMIA: ICD-10-CM

## 2022-03-31 DIAGNOSIS — F41.9 ANXIETY: ICD-10-CM

## 2022-03-31 DIAGNOSIS — H35.30 MACULAR DEGENERATION, UNSPECIFIED LATERALITY, UNSPECIFIED TYPE: ICD-10-CM

## 2022-03-31 DIAGNOSIS — I10 ESSENTIAL HYPERTENSION: Primary | ICD-10-CM

## 2022-03-31 DIAGNOSIS — J43.9 PULMONARY EMPHYSEMA, UNSPECIFIED EMPHYSEMA TYPE (H): ICD-10-CM

## 2022-03-31 PROCEDURE — 99214 OFFICE O/P EST MOD 30 MIN: CPT | Mod: 95 | Performed by: INTERNAL MEDICINE

## 2022-03-31 NOTE — PATIENT INSTRUCTIONS
No change in treatment plan.  Follow-up for adult wellness visit physical exam after June 23    Your second COVID-19 vaccine booster is due now, get that at your local assisted living residence or local pharmacy.

## 2022-03-31 NOTE — PROGRESS NOTES
Suzanna is a 81 year old who is being evaluated via a billable video visit.      How would you like to obtain your AVS? MyChart  If the video visit is dropped, the invitation should be resent by: Text to cell phone: [phone]  Will anyone else be joining your video visit? No      Video Start Time: 2:44 PM      [facility] Follow Up Note    Celeste Beach Sudhir   81 year old female    Date of Visit: 3/31/2022    Chief Complaint   Patient presents with     Follow Up     4 month check - no concerns     Subjective  Suzanna scheduled a virtual visit to follow-up on her chronic medical issues.    She has a past history of moderately severe COPD but stable.  She did have a recent bronchitis exacerbation consistent with viral URI but she likely caught from her son.  She was treated with prednisone and Z-Pedro and is recovered to baseline level.  He has moderate dyspnea on exertion walking the hallways but stable.  Not needing oxygen.  No flare in chronic cough.  On inhalers.  2017 chest CT scan showed mild bronchiectasis.    History of chronic anxiety, currently controlled on Prozac 20 mg a day and just rare use of lorazepam.  No falls.  No confusional episodes.    She is limiting her coffee to just 2 cups a day.    Past 3 of chronic hyponatremia, but well controlled on sodium chloride supplement which she continues to take daily.  No edema.  She is eating regular meals.    Severe macular degeneration but sees ophthalmology regularly.  Vision stable.    No chest pain or palpitations.  No abdominal pain complaints or change in bowels.    Hypertension has been well controlled with blood pressures in the 130s/70s-140/80 range.  No orthostasis.  On amlodipine 10 mg a day and lisinopril 40 mg a day.    I reviewed labs from November 2021 with a creatinine of 0.85.  Sodium 139.  Blood sugar and liver test normal.  Normal TSH.  Normal hemogram.    Vitamin D level was 29 at that time, continues on 2000 IU vitamin D.  Past  history of osteoporosis but she does not want to consider Prolia or repeat DEXA scan.  She had lost her teeth from IV bisphosphonate previously.    Her cats Hardy and Milena.            PMHx:    Past Medical History:   Diagnosis Date     Acute H. pylori gastric ulcer 2010    Treated with no further Sx     Anxiety and depression     Stabilized on prozac 20mg qday.  Uses prn lorazepam.     Hiatal hernia      HTN (hypertension)      Hyponatremia     severe/ hospitalized when Tx for H. pylori/ ulcer.  No longer on HCTZ.     Macular degeneration     Wet with submacular hem R eye.  Avastin shots given x4 8/11 - 1/12.     Osteoporosis     4/13 DEXA S-1.9/F-3.5.  alendronate for two years, then teeth fell out.  Prolia x2 infections,then Reclast started 12/13.  No fracture Hx.       Shingles 2009    Thorasic, no post herpetic neuralgia.     PSHx:    Past Surgical History:   Procedure Laterality Date     TONSILLECTOMY, ADENOIDECTOMY, MYRINGOTOMY, INSERT TUBE BILATERAL, COMBINED      No other surgeries.       Immunizations:   Immunization History   Administered Date(s) Administered     COVID-19,PF,Moderna 01/27/2021, 02/24/2021     Flu, Unspecified 09/01/2014     Influenza (High Dose) 3 valent vaccine 09/14/2015, 11/08/2016, 10/16/2017, 09/25/2018, 10/03/2019     Pneumo Conj 13-V (2010&after) 12/05/2014     Pneumococcal 23 valent 03/10/2017       ROS A comprehensive review of systems was performed and was otherwise negative    Medications, allergies, and problem list were reviewed and updated    Exam  There were no vitals taken for this visit.  She appears well on video.  Moderate kyphosis.  No dyspnea.  Color is good.  No neurologic changes.    Assessment/Plan  1. Essential hypertension  Controlled.  Continue current medications.  Follow-up in the summer for physical exam with lab work planned at that time.    2. Pulmonary emphysema, unspecified emphysema type (H)  Stable.  Has recovered from recent bronchitis flare, likely  viral.  Can use prednisone and Z-Pedro for flares in the future.  Not needing oxygen.  Continue current inhalers.  Follow-up with pulmonary clinic on April 13    3. Macular degeneration, unspecified laterality, unspecified type  Stable, managed by ophthalmology    4. Anxiety  Controlled.  Prozac.  As needed use of lorazepam.  She has not had falls or new confusional spells.  She does know to limit lorazepam and there is risk with that medication.    5. Hyponatremia  Chronic.  Currently well controlled and sodium normal on last check.  Continue sodium chloride supplement daily.  Limit caffeine intake.  Make sure she is eating regular meals.    She was reminded to get her second Covid booster which is now due.  She will likely get that at local residence      Return in about 3 months (around 6/30/2022) for Routine preventive.   Patient Instructions   No change in treatment plan.  Follow-up for adult wellness visit physical exam after June 23    Your second COVID-19 vaccine booster is due now, get that at your local assisted living residence or local pharmacy.    Usman Gaines MD, MD        Current Outpatient Medications   Medication Sig Dispense Refill     albuterol (PROAIR HFA;PROVENTIL HFA;VENTOLIN HFA) 90 mcg/actuation inhaler [ALBUTEROL (PROAIR HFA;PROVENTIL HFA;VENTOLIN HFA) 90 MCG/ACTUATION INHALER] Inhale 2 puffs every 6 (six) hours as needed for wheezing. 1 each 6     amLODIPine (NORVASC) 10 MG tablet TAKE 1 TABLET (10 MG TOTAL) BY MOUTH DAILY. 90 tablet 3     budesonide-formoterol (SYMBICORT) 160-4.5 MCG/ACT Inhaler Inhale 2 puffs into the lungs 2 times daily 10.2 g 11     calcium carbonate (OS-NATALEE) 600 mg (1,500 mg) tablet [CALCIUM CARBONATE (OS-NATALEE) 600 MG (1,500 MG) TABLET] Take 600 mg by mouth daily.        cholecalciferol, vitamin D3, (VITAMIN D3) 2,000 unit Tab [CHOLECALCIFEROL, VITAMIN D3, (VITAMIN D3) 2,000 UNIT TAB] Once a day 100 each 3     Cranberry-Vitamin C-Vitamin E 140-100-3 MG-MG-UNIT CAPS Take  2 tablets by mouth daily       FLUoxetine (PROZAC) 20 MG capsule [FLUOXETINE (PROZAC) 20 MG CAPSULE] TAKE 1 CAPSULE BY MOUTH ONE TIME DAILY 90 capsule 3     ibuprofen (ADVIL,MOTRIN) 200 MG tablet [IBUPROFEN (ADVIL,MOTRIN) 200 MG TABLET] Take 200 mg by mouth every 6 (six) hours as needed for pain (takes 2-3 tablets twice a day for joint aches).       lisinopril (ZESTRIL) 40 MG tablet Take 1 tablet (40 mg) by mouth daily 90 tablet 2     LORazepam (ATIVAN) 0.5 MG tablet Take 1 tablet (0.5 mg) by mouth 2 times daily as needed for anxiety 60 tablet 0     ondansetron (ZOFRAN ODT) 4 MG disintegrating tablet [ONDANSETRON (ZOFRAN ODT) 4 MG DISINTEGRATING TABLET] Take 1 tablet (4 mg total) by mouth every 8 (eight) hours as needed for nausea. 30 tablet 1     sodium chloride 1 gram tablet [SODIUM CHLORIDE 1 GRAM TABLET] Take 1 tablet (1 g total) by mouth daily. 90 tablet 3     vitamin A-vitamin C-vit E-min (OCUVITE) Tab tablet [VITAMIN A-VITAMIN C-VIT E-MIN (OCUVITE) TAB TABLET] Take by mouth daily.       predniSONE (DELTASONE) 20 MG tablet Take 2 tablets (40 mg) by mouth daily (Patient not taking: Reported on 2021) 30 tablet 6     Allergies   Allergen Reactions     Sulfa (Sulfonamide Antibiotics) [Sulfa Drugs] Diarrhea and Nausea     Also rash, throat swelling     Incruse Ellipta [Umeclidinium] Angioedema     Mouth and tongue swelling     Social History     Tobacco Use     Smoking status: Former Smoker     Types: Cigarettes     Quit date: 2010     Years since quittin.3     Smokeless tobacco: Never Used   Substance Use Topics     Alcohol use: No     Drug use: No           Video-Visit Details    Type of service:  Video Visit    Video End Time:2:56 PM    Originating Location (pt. Location): Home    Distant Location (provider location):  [facility]     Platform used for Video Visit: Saroj

## 2022-04-13 ENCOUNTER — OFFICE VISIT (OUTPATIENT)
Dept: PULMONOLOGY | Facility: OTHER | Age: 82
End: 2022-04-13
Payer: MEDICARE

## 2022-04-13 VITALS
DIASTOLIC BLOOD PRESSURE: 60 MMHG | BODY MASS INDEX: 19.2 KG/M2 | HEART RATE: 86 BPM | OXYGEN SATURATION: 91 % | SYSTOLIC BLOOD PRESSURE: 140 MMHG | WEIGHT: 111 LBS | RESPIRATION RATE: 16 BRPM

## 2022-04-13 DIAGNOSIS — J44.9 COPD, GROUP B, BY GOLD 2017 CLASSIFICATION (H): ICD-10-CM

## 2022-04-13 PROCEDURE — 99214 OFFICE O/P EST MOD 30 MIN: CPT | Performed by: INTERNAL MEDICINE

## 2022-04-13 NOTE — PROGRESS NOTES
"Pulmonary Clinic Follow-up Visit    Impression: Celeste Peguero (\"Suzanna\") is an 81 y.o. female with PMH significant HTN, tobacco use, and emphysema with COPD GOLD class B, here for annual follow up. She has recovered from a minor COPD exacerbation and bronchitis. Otherwise doing well with good quality of life and reasonable exercise tolerance.      Recommendations:  - continue Symbicort high dose, she can reduce to 1 puff bid with spacer. Rinse/gargle after use.  - she has an active prednisone prescription for her action plan.  - continue albuterol as needed  - encouraged her to exercise and remain active  - declines pulmonary rehab as she is unable to go to the program during the week.   - UTD with pneumococcal vaccines as well as covid-19 vaccine + booster. She should get the flu shot this Fall.   - no indication for add'l LDCT lung cancer screening given her age.      Follow up in 1 year or sooner if needed.    Ayden Duenas MD (Avi)  [facility] Pulmonary & Critical Care  Pager [phone]  Clinic [phone]  Fax [phone]      CCx: COPD follow up    HPI: Interim history: I last saw Suzanna on 9/17/21.  I asked her to follow up in 1 year from that visit.  She recently required prednisone and a z-pack for bronchitis/COPD exacerbation. She seemed to recover from that well.  Today, she reports she's doing OK.   Keeping busy at senior living.   She is able to keep active and go for walks.    ROS:  A 12-system review was obtained and was negative with the exception of the symptoms endorsed in the history of present illness.    PMH:  Past Medical History:   Diagnosis Date     Acute H. pylori gastric ulcer 2010    Treated with no further Sx     Anxiety and depression     Stabilized on prozac 20mg qday.  Uses prn lorazepam.     Hiatal hernia      HTN (hypertension)      Hyponatremia     severe/ hospitalized when Tx for H. pylori/ ulcer.  No longer on HCTZ.     Macular degeneration     Wet " with submacular hem R eye.  Avastin shots given x4  - .     Osteoporosis      DEXA S-1.9/F-3.5.  alendronate for two years, then teeth fell out.  Prolia x2 infections,then Reclast started .  No fracture Hx.       Shingles     Thorasic, no post herpetic neuralgia.       PSH:  Past Surgical History:   Procedure Laterality Date     TONSILLECTOMY, ADENOIDECTOMY, MYRINGOTOMY, INSERT TUBE BILATERAL, COMBINED      No other surgeries.         Allergies:  Allergies   Allergen Reactions     Sulfa (Sulfonamide Antibiotics) [Sulfa Drugs] Diarrhea and Nausea     Also rash, throat swelling     Incruse Ellipta [Umeclidinium] Angioedema     Mouth and tongue swelling       Family HX:  Family History   Problem Relation Age of Onset     Cerebrovascular Disease Maternal Grandmother        Social Hx:  Social History     Socioeconomic History     Marital status:      Spouse name: Not on file     Number of children: Not on file     Years of education: Not on file     Highest education level: Not on file   Occupational History     Not on file   Tobacco Use     Smoking status: Former Smoker     Types: Cigarettes     Quit date: 2010     Years since quittin.3     Smokeless tobacco: Never Used   Substance and Sexual Activity     Alcohol use: No     Drug use: No     Sexual activity: Never   Other Topics Concern     Not on file   Social History Narrative    L[facility] [address]  Has Spaniel  dog.   with COPD.     Social Determinants of Health     Financial Resource Strain: Not on file   Food Insecurity: Not on file   Transportation Needs: Not on file   Physical Activity: Not on file   Stress: Not on file   Social Connections: Not on file   Intimate Partner Violence: Not on file   Housing Stability: Not on file       Current Meds:  Current Outpatient Medications   Medication Sig Dispense Refill     albuterol (PROAIR HFA;PROVENTIL HFA;VENTOLIN HFA) 90 mcg/actuation inhaler  [ALBUTEROL (PROAIR HFA;PROVENTIL HFA;VENTOLIN HFA) 90 MCG/ACTUATION INHALER] Inhale 2 puffs every 6 (six) hours as needed for wheezing. 1 each 6     amLODIPine (NORVASC) 10 MG tablet TAKE 1 TABLET (10 MG TOTAL) BY MOUTH DAILY. 90 tablet 3     budesonide-formoterol (SYMBICORT) 160-4.5 MCG/ACT Inhaler Inhale 2 puffs into the lungs 2 times daily 10.2 g 11     calcium carbonate (OS-NATALEE) 600 mg (1,500 mg) tablet [CALCIUM CARBONATE (OS-NATALEE) 600 MG (1,500 MG) TABLET] Take 600 mg by mouth daily.        cholecalciferol, vitamin D3, (VITAMIN D3) 2,000 unit Tab [CHOLECALCIFEROL, VITAMIN D3, (VITAMIN D3) 2,000 UNIT TAB] Once a day 100 each 3     Cranberry-Vitamin C-Vitamin E 140-100-3 MG-MG-UNIT CAPS Take 2 tablets by mouth daily       FLUoxetine (PROZAC) 20 MG capsule [FLUOXETINE (PROZAC) 20 MG CAPSULE] TAKE 1 CAPSULE BY MOUTH ONE TIME DAILY 90 capsule 3     ibuprofen (ADVIL,MOTRIN) 200 MG tablet [IBUPROFEN (ADVIL,MOTRIN) 200 MG TABLET] Take 200 mg by mouth every 6 (six) hours as needed for pain (takes 2-3 tablets twice a day for joint aches).       lisinopril (ZESTRIL) 40 MG tablet Take 1 tablet (40 mg) by mouth daily 90 tablet 2     LORazepam (ATIVAN) 0.5 MG tablet Take 1 tablet (0.5 mg) by mouth 2 times daily as needed for anxiety 60 tablet 0     ondansetron (ZOFRAN ODT) 4 MG disintegrating tablet [ONDANSETRON (ZOFRAN ODT) 4 MG DISINTEGRATING TABLET] Take 1 tablet (4 mg total) by mouth every 8 (eight) hours as needed for nausea. 30 tablet 1     sodium chloride 1 gram tablet [SODIUM CHLORIDE 1 GRAM TABLET] Take 1 tablet (1 g total) by mouth daily. 90 tablet 3     vitamin A-vitamin C-vit E-min (OCUVITE) Tab tablet Take 1 tablet by mouth daily       predniSONE (DELTASONE) 20 MG tablet Take 2 tablets (40 mg) by mouth daily (Patient not taking: Reported on 4/13/2022) 30 tablet 6       Physical Exam:  BP (!) 140/60 (BP Location: Left arm, Patient Position: Chair, Cuff Size: Adult Regular)   Pulse 86   Resp 16   Wt 50.3 kg (111  lb)   SpO2 91%   BMI 19.20 kg/m    Gen: awake, alert, oriented, no distress  HEENT: nasal turbinates are unremarkable, no oropharyngeal lesions, no cervical or supraclavicular lymphadenopathy  CV: RRR, no M/G/R  Resp: diminished air entry bilaterally with very mild end exp wheezing, prolonged expiratory phase.   Skin: no apparent rashes  Ext: no cyanosis, clubbing or edema  Neuro: alert, nonfocal    Labs:  Reviewed  Chem panel unremarkable  CBC OK, no diff    Imaging studies:  LDCT from 2017    IMPRESSION:  CONCLUSION:  1.  Stable benign 2 mm left lower lobe nodule with no additional suspicious nodules.  2.  Moderate to severe centrilobular emphysema.  3.  Mild right middle lobe bronchiectasis with associated mild fibroatelectasis redemonstrated.    Pulmonary Function Testing  PFTs 2017  FEV1/FVC is 44 and is reduced.  FEV1 is 32% predicted and is reduced.  FVC is 57% predicted and reduced.  There was improvement in spirometry after a single inhaled dose of bronchodilator.  Flow volume loop shows scooping indicating obstruction  TLC is 235% predicted and is increased.  RV is 450% predicted and is increased.  DLCO is 22% predicted and is reduced when it   is corrected for hemoglobin.

## 2022-04-13 NOTE — LETTER
"    4/13/2022         RE: Celeste Peguero  [address]  [place] [address]        Dear Colleague,    Thank you for referring your patient, Celeste Peguero, to the [facility]. Please see a copy of my visit note below.    Pulmonary Clinic Follow-up Visit    Impression: Celeste Peguero (\"Suzanna\") is an 81 y.o. female with PMH significant HTN, tobacco use, and emphysema with COPD GOLD class B, here for annual follow up. She has recovered from a minor COPD exacerbation and bronchitis. Otherwise doing well with good quality of life and reasonable exercise tolerance.      Recommendations:  - continue Symbicort high dose, she can reduce to 1 puff bid with spacer. Rinse/gargle after use.  - she has an active prednisone prescription for her action plan.  - continue albuterol as needed  - encouraged her to exercise and remain active  - declines pulmonary rehab as she is unable to go to the program during the week.   - UTD with pneumococcal vaccines as well as covid-19 vaccine + booster. She should get the flu shot this Fall.   - no indication for add'l LDCT lung cancer screening given her age.      Follow up in 1 year or sooner if needed.    Ayden (Ashli Duenas MD  [facility]  Pulmonary & Critical Care  Pager [phone]  Clinic [phone]  Fax [phone]      CCx: COPD follow up    HPI: Interim history: I last saw Suzanna on 9/17/21.  I asked her to follow up in 1 year from that visit.  She recently required prednisone and a z-pack for bronchitis/COPD exacerbation. She seemed to recover from that well.  Today, she reports she's doing OK.   Keeping busy at senior living.   She is able to keep active and go for walks.    ROS:  A 12-system review was obtained and was negative with the exception of the symptoms endorsed in the history of present illness.    PMH:  Past Medical History:   Diagnosis Date     Acute H. pylori gastric ulcer 2010    " Treated with no further Sx     Anxiety and depression     Stabilized on prozac 20mg qday.  Uses prn lorazepam.     Hiatal hernia      HTN (hypertension)      Hyponatremia     severe/ hospitalized when Tx for H. pylori/ ulcer.  No longer on HCTZ.     Macular degeneration     Wet with submacular hem R eye.  Avastin shots given x4  - .     Osteoporosis      DEXA S-1.9/F-3.5.  alendronate for two years, then teeth fell out.  Prolia x2 infections,then Reclast started .  No fracture Hx.       Shingles 2009    Thorasic, no post herpetic neuralgia.       PSH:  Past Surgical History:   Procedure Laterality Date     TONSILLECTOMY, ADENOIDECTOMY, MYRINGOTOMY, INSERT TUBE BILATERAL, COMBINED      No other surgeries.         Allergies:  Allergies   Allergen Reactions     Sulfa (Sulfonamide Antibiotics) [Sulfa Drugs] Diarrhea and Nausea     Also rash, throat swelling     Incruse Ellipta [Umeclidinium] Angioedema     Mouth and tongue swelling       Family HX:  Family History   Problem Relation Age of Onset     Cerebrovascular Disease Maternal Grandmother        Social Hx:  Social History     Socioeconomic History     Marital status:      Spouse name: Not on file     Number of children: Not on file     Years of education: Not on file     Highest education level: Not on file   Occupational History     Not on file   Tobacco Use     Smoking status: Former Smoker     Types: Cigarettes     Quit date: 2010     Years since quittin.3     Smokeless tobacco: Never Used   Substance and Sexual Activity     Alcohol use: No     Drug use: No     Sexual activity: Never   Other Topics Concern     Not on file   Social History Narrative    Lives at [facility] on [address]  Has Spaniel  dog.   with COPD.     Social Determinants of Health     Financial Resource Strain: Not on file   Food Insecurity: Not on file   Transportation Needs: Not on file   Physical Activity: Not on file   Stress: Not on  file   Social Connections: Not on file   Intimate Partner Violence: Not on file   Housing Stability: Not on file       Current Meds:  Current Outpatient Medications   Medication Sig Dispense Refill     albuterol (PROAIR HFA;PROVENTIL HFA;VENTOLIN HFA) 90 mcg/actuation inhaler [ALBUTEROL (PROAIR HFA;PROVENTIL HFA;VENTOLIN HFA) 90 MCG/ACTUATION INHALER] Inhale 2 puffs every 6 (six) hours as needed for wheezing. 1 each 6     amLODIPine (NORVASC) 10 MG tablet TAKE 1 TABLET (10 MG TOTAL) BY MOUTH DAILY. 90 tablet 3     budesonide-formoterol (SYMBICORT) 160-4.5 MCG/ACT Inhaler Inhale 2 puffs into the lungs 2 times daily 10.2 g 11     calcium carbonate (OS-NATALEE) 600 mg (1,500 mg) tablet [CALCIUM CARBONATE (OS-NATALEE) 600 MG (1,500 MG) TABLET] Take 600 mg by mouth daily.        cholecalciferol, vitamin D3, (VITAMIN D3) 2,000 unit Tab [CHOLECALCIFEROL, VITAMIN D3, (VITAMIN D3) 2,000 UNIT TAB] Once a day 100 each 3     Cranberry-Vitamin C-Vitamin E 140-100-3 MG-MG-UNIT CAPS Take 2 tablets by mouth daily       FLUoxetine (PROZAC) 20 MG capsule [FLUOXETINE (PROZAC) 20 MG CAPSULE] TAKE 1 CAPSULE BY MOUTH ONE TIME DAILY 90 capsule 3     ibuprofen (ADVIL,MOTRIN) 200 MG tablet [IBUPROFEN (ADVIL,MOTRIN) 200 MG TABLET] Take 200 mg by mouth every 6 (six) hours as needed for pain (takes 2-3 tablets twice a day for joint aches).       lisinopril (ZESTRIL) 40 MG tablet Take 1 tablet (40 mg) by mouth daily 90 tablet 2     LORazepam (ATIVAN) 0.5 MG tablet Take 1 tablet (0.5 mg) by mouth 2 times daily as needed for anxiety 60 tablet 0     ondansetron (ZOFRAN ODT) 4 MG disintegrating tablet [ONDANSETRON (ZOFRAN ODT) 4 MG DISINTEGRATING TABLET] Take 1 tablet (4 mg total) by mouth every 8 (eight) hours as needed for nausea. 30 tablet 1     sodium chloride 1 gram tablet [SODIUM CHLORIDE 1 GRAM TABLET] Take 1 tablet (1 g total) by mouth daily. 90 tablet 3     vitamin A-vitamin C-vit E-min (OCUVITE) Tab tablet Take 1 tablet by mouth daily        predniSONE (DELTASONE) 20 MG tablet Take 2 tablets (40 mg) by mouth daily (Patient not taking: Reported on 4/13/2022) 30 tablet 6       Physical Exam:  BP (!) 140/60 (BP Location: Left arm, Patient Position: Chair, Cuff Size: Adult Regular)   Pulse 86   Resp 16   Wt 50.3 kg (111 lb)   SpO2 91%   BMI 19.20 kg/m    Gen: awake, alert, oriented, no distress  HEENT: nasal turbinates are unremarkable, no oropharyngeal lesions, no cervical or supraclavicular lymphadenopathy  CV: RRR, no M/G/R  Resp: diminished air entry bilaterally with very mild end exp wheezing, prolonged expiratory phase.   Skin: no apparent rashes  Ext: no cyanosis, clubbing or edema  Neuro: alert, nonfocal    Labs:  Reviewed  Chem panel unremarkable  CBC OK, no diff    Imaging studies:  LDCT from 2017    IMPRESSION:  CONCLUSION:  1.  Stable benign 2 mm left lower lobe nodule with no additional suspicious nodules.  2.  Moderate to severe centrilobular emphysema.  3.  Mild right middle lobe bronchiectasis with associated mild fibroatelectasis redemonstrated.    Pulmonary Function Testing  PFTs 2017  FEV1/FVC is 44 and is reduced.  FEV1 is 32% predicted and is reduced.  FVC is 57% predicted and reduced.  There was improvement in spirometry after a single inhaled dose of bronchodilator.  Flow volume loop shows scooping indicating obstruction  TLC is 235% predicted and is increased.  RV is 450% predicted and is increased.  DLCO is 22% predicted and is reduced when it   is corrected for hemoglobin.      Again, thank you for allowing me to participate in the care of your patient.        Sincerely,        Ayden Duenas MD

## 2022-04-21 ENCOUNTER — TELEPHONE (OUTPATIENT)
Dept: INTERNAL MEDICINE | Facility: CLINIC | Age: 82
End: 2022-04-21
Payer: MEDICARE

## 2022-04-21 DIAGNOSIS — J20.9 ACUTE BRONCHITIS, UNSPECIFIED ORGANISM: Primary | ICD-10-CM

## 2022-04-21 RX ORDER — AZITHROMYCIN 250 MG/1
TABLET, FILM COATED ORAL
Qty: 6 TABLET | Refills: 1 | Status: SHIPPED | OUTPATIENT
Start: 2022-04-21 | End: 2022-04-26

## 2022-04-21 RX ORDER — PREDNISONE 20 MG/1
20 TABLET ORAL DAILY
Qty: 5 TABLET | Refills: 1 | Status: SHIPPED | OUTPATIENT
Start: 2022-04-21 | End: 2022-04-26

## 2022-04-21 NOTE — TELEPHONE ENCOUNTER
I received a message from daughter-in-law that patient developed bronchitis symptoms yesterday.  COVID-19 test was negative yesterday as well as today.  Patient has a standing plan for Z-Pedro and 5 days of prednisone as needed for bronchitis flare.  Underlying COPD.    Prescription sent for Z-Pedro and 5 days prednisone 20 mg daily to pharmacy.  Daughter-in-law will contact me if any worsening condition.

## 2022-04-22 ENCOUNTER — MYC REFILL (OUTPATIENT)
Dept: INTERNAL MEDICINE | Facility: CLINIC | Age: 82
End: 2022-04-22
Payer: MEDICARE

## 2022-04-22 DIAGNOSIS — F41.9 ANXIETY: ICD-10-CM

## 2022-04-22 RX ORDER — LORAZEPAM 0.5 MG/1
0.5 TABLET ORAL 2 TIMES DAILY PRN
Qty: 60 TABLET | Refills: 0 | Status: SHIPPED | OUTPATIENT
Start: 2022-04-22 | End: 2022-08-29

## 2022-04-22 NOTE — TELEPHONE ENCOUNTER
Please have provider available refill this lorazepam refill.  I am unable to refill this from outside clinic.

## 2022-07-23 ENCOUNTER — HEALTH MAINTENANCE LETTER (OUTPATIENT)
Age: 82
End: 2022-07-23

## 2022-08-28 DIAGNOSIS — F41.9 ANXIETY: ICD-10-CM

## 2022-08-29 RX ORDER — LORAZEPAM 0.5 MG/1
TABLET ORAL
Qty: 60 TABLET | Refills: 0 | Status: SHIPPED | OUTPATIENT
Start: 2022-08-29 | End: 2024-01-01

## 2022-09-01 DIAGNOSIS — E87.1 HYPONATREMIA: ICD-10-CM

## 2022-09-01 RX ORDER — SODIUM CHLORIDE 1 G/1
1 TABLET ORAL DAILY
Qty: 90 TABLET | Refills: 3 | Status: SHIPPED | OUTPATIENT
Start: 2022-09-01 | End: 2023-01-16

## 2022-09-01 NOTE — TELEPHONE ENCOUNTER
9-1-22    Medication Question or Refill        What medication are you calling about (include dose and sig)?:   sodium chloride 1 gram tablet    Controlled Substance Agreement on file:   CSA -- Patient Level:    CSA: None found at the patient level.       Who prescribed the medication?: wayne    Do you need a refill? Yes:     When did you use the medication last? daily    Patient offered an appointment? No    Do you have any questions or concerns?  No    Preferred Pharmacy:   [facility] - [place], [place] - [address] [place] [address]  Phone: [phone] Fax: [phone]

## 2022-09-01 NOTE — TELEPHONE ENCOUNTER
Routing refill request to provider for review/approval because:  Drug not on the [facility] refill protocol   Pending Prescriptions:                       Disp   Refills    sodium chloride 1 GM tablet                90 tab*3        Sig: Take 1 tablet (1 g) by mouth daily    Last Written Prescription Date:  6/23/2021  Last Fill Quantity: 90,  # refills: 3   Last office visitwith prescribing provider: 3/31/22 Virtually  Future Office Visit:  None Scheduled    JOANIE BanksN, RN  [facility]

## 2022-09-27 ENCOUNTER — TELEPHONE (OUTPATIENT)
Dept: INTERNAL MEDICINE | Facility: CLINIC | Age: 82
End: 2022-09-27

## 2022-09-27 DIAGNOSIS — N39.0 URINARY TRACT INFECTION WITHOUT HEMATURIA, SITE UNSPECIFIED: Primary | ICD-10-CM

## 2022-09-27 RX ORDER — CEFUROXIME AXETIL 250 MG/1
250 TABLET ORAL 2 TIMES DAILY
Qty: 14 TABLET | Refills: 0 | Status: SHIPPED | OUTPATIENT
Start: 2022-09-27 | End: 2022-10-04

## 2022-09-27 NOTE — TELEPHONE ENCOUNTER
Daughter-in-law contacted me that patient was having urinary tract infection symptoms and some mild increased confusion.  No worsening shortness of breath or respiratory symptoms.    Treat with Ceftin for 7 days for possible UTI.  Daughter-in-law was told if she is not better in 2 days to bring her into clinic for further evaluation.

## 2022-10-01 ENCOUNTER — HEALTH MAINTENANCE LETTER (OUTPATIENT)
Age: 82
End: 2022-10-01

## 2022-11-04 DIAGNOSIS — J43.9 PULMONARY EMPHYSEMA, UNSPECIFIED EMPHYSEMA TYPE (H): Primary | ICD-10-CM

## 2022-11-04 RX ORDER — PREDNISONE 20 MG/1
20 TABLET ORAL DAILY
Qty: 5 TABLET | Refills: 0 | Status: SHIPPED | OUTPATIENT
Start: 2022-11-04 | End: 2022-11-09

## 2022-11-21 ENCOUNTER — TELEPHONE (OUTPATIENT)
Dept: INTERNAL MEDICINE | Facility: CLINIC | Age: 82
End: 2022-11-21

## 2022-11-21 DIAGNOSIS — J44.1 COPD EXACERBATION (H): Primary | ICD-10-CM

## 2022-11-21 RX ORDER — PREDNISONE 20 MG/1
20 TABLET ORAL DAILY
Qty: 5 TABLET | Refills: 0 | Status: SHIPPED | OUTPATIENT
Start: 2022-11-21 | End: 2022-11-26

## 2022-11-21 NOTE — TELEPHONE ENCOUNTER
Patient is complaining of increasing dyspnea on exertion with history of severe COPD.  No fevers.  Patient is requesting prednisone prescription as she has had that in the past.  5-day prednisone prescription for 20 mg daily was given.  Further communication through the daughter-in-law will occur.  Patient should be taking her Symbicort inhaler.

## 2022-11-22 ENCOUNTER — OFFICE VISIT (OUTPATIENT)
Dept: INTERNAL MEDICINE | Facility: CLINIC | Age: 82
End: 2022-11-22
Payer: MEDICARE

## 2022-11-22 ENCOUNTER — ANCILLARY PROCEDURE (OUTPATIENT)
Dept: GENERAL RADIOLOGY | Facility: CLINIC | Age: 82
End: 2022-11-22
Attending: INTERNAL MEDICINE
Payer: MEDICARE

## 2022-11-22 VITALS
HEIGHT: 64 IN | DIASTOLIC BLOOD PRESSURE: 68 MMHG | SYSTOLIC BLOOD PRESSURE: 122 MMHG | OXYGEN SATURATION: 90 % | TEMPERATURE: 98.2 F | BODY MASS INDEX: 19.2 KG/M2 | HEART RATE: 88 BPM | RESPIRATION RATE: 18 BRPM

## 2022-11-22 DIAGNOSIS — I10 ESSENTIAL HYPERTENSION: ICD-10-CM

## 2022-11-22 DIAGNOSIS — R05.1 ACUTE COUGH: ICD-10-CM

## 2022-11-22 DIAGNOSIS — Z51.81 ENCOUNTER FOR THERAPEUTIC DRUG MONITORING: ICD-10-CM

## 2022-11-22 DIAGNOSIS — F41.9 ANXIETY: ICD-10-CM

## 2022-11-22 DIAGNOSIS — J44.1 COPD EXACERBATION (H): Primary | ICD-10-CM

## 2022-11-22 LAB
ANION GAP SERPL CALCULATED.3IONS-SCNC: 18 MMOL/L (ref 7–15)
BUN SERPL-MCNC: 11.3 MG/DL (ref 8–23)
CALCIUM SERPL-MCNC: 9.7 MG/DL (ref 8.8–10.2)
CHLORIDE SERPL-SCNC: 97 MMOL/L (ref 98–107)
CREAT SERPL-MCNC: 0.82 MG/DL (ref 0.51–0.95)
DEPRECATED HCO3 PLAS-SCNC: 22 MMOL/L (ref 22–29)
FLUAV AG SPEC QL IA: NEGATIVE
FLUBV AG SPEC QL IA: NEGATIVE
GFR SERPL CREATININE-BSD FRML MDRD: 71 ML/MIN/1.73M2
GLUCOSE SERPL-MCNC: 184 MG/DL (ref 70–99)
POTASSIUM SERPL-SCNC: 4.6 MMOL/L (ref 3.4–5.3)
SARS-COV-2 RNA RESP QL NAA+PROBE: NEGATIVE
SODIUM SERPL-SCNC: 137 MMOL/L (ref 136–145)

## 2022-11-22 PROCEDURE — 80048 BASIC METABOLIC PNL TOTAL CA: CPT | Performed by: INTERNAL MEDICINE

## 2022-11-22 PROCEDURE — 71046 X-RAY EXAM CHEST 2 VIEWS: CPT | Mod: TC | Performed by: RADIOLOGY

## 2022-11-22 PROCEDURE — U0005 INFEC AGEN DETEC AMPLI PROBE: HCPCS | Performed by: INTERNAL MEDICINE

## 2022-11-22 PROCEDURE — 36415 COLL VENOUS BLD VENIPUNCTURE: CPT | Performed by: INTERNAL MEDICINE

## 2022-11-22 PROCEDURE — 87804 INFLUENZA ASSAY W/OPTIC: CPT | Performed by: INTERNAL MEDICINE

## 2022-11-22 PROCEDURE — U0003 INFECTIOUS AGENT DETECTION BY NUCLEIC ACID (DNA OR RNA); SEVERE ACUTE RESPIRATORY SYNDROME CORONAVIRUS 2 (SARS-COV-2) (CORONAVIRUS DISEASE [COVID-19]), AMPLIFIED PROBE TECHNIQUE, MAKING USE OF HIGH THROUGHPUT TECHNOLOGIES AS DESCRIBED BY CMS-2020-01-R: HCPCS | Performed by: INTERNAL MEDICINE

## 2022-11-22 PROCEDURE — 99215 OFFICE O/P EST HI 40 MIN: CPT | Performed by: INTERNAL MEDICINE

## 2022-11-22 RX ORDER — AZITHROMYCIN 250 MG/1
TABLET, FILM COATED ORAL
Qty: 6 TABLET | Refills: 0 | Status: SHIPPED | OUTPATIENT
Start: 2022-11-22 | End: 2022-11-27

## 2022-11-22 RX ORDER — PREDNISONE 20 MG/1
TABLET ORAL
Qty: 11 TABLET | Refills: 0 | Status: SHIPPED | OUTPATIENT
Start: 2022-11-22 | End: 2023-01-16

## 2022-11-22 RX ORDER — LORAZEPAM 0.5 MG/1
TABLET ORAL
Qty: 60 TABLET | Refills: 0 | Status: CANCELLED | OUTPATIENT
Start: 2022-11-22

## 2022-11-22 ASSESSMENT — ENCOUNTER SYMPTOMS: SHORTNESS OF BREATH: 1

## 2022-11-22 NOTE — PROGRESS NOTES
Celeste Peguero   82 year old female    Date of Visit: 11/22/2022    Chief Complaint   Patient presents with     Shortness of Breath     Gets more short of breath with exertion.      Subjective  Brought in by son and daughter-in-law for further evaluation of COPD exacerbation.    Patient had new cough and short of breath with sore throat starting 3 days ago on Saturday.  She has not been tested for influenza or COVID at this time.  Did get influenza and recent COVID-vaccine, however.    No further fevers.    History of severe COPD, previous smoker.    She is using Symbicort and albuterol.    She was started on prednisone 40 mg a day last night.  She did sleep better last night and feels better this morning.    Still significant dyspnea on exertion.  She was satting 85% on room air last night, does not have home oxygen.    S history of hyponatremia.  But she did eat some soup last night and cheesecake this morning.  Denies abdominal pain or nausea or diarrhea currently.    Has history of hypertension, still controlled on lisinopril 40 mg and amlodipine 10 mg.    No urinary complaints, was treated in September for UTI with Ceftin.    She was treated with prednisone and a Z-Pedro in April of this year and October of last year.    History of chronic anxiety and has been using as needed lorazepam.  Also on Prozac.  Some increase in anxiety with current illness and the death of her mother earlier this month.    Severe macular degeneration and near blindness.  She is not had falls.    She does continue on the sodium chloride pill 1 a day.    No chest pain or chest pressure.    PMHx:    Past Medical History:   Diagnosis Date     Acute H. pylori gastric ulcer 2010    Treated with no further Sx     Anxiety and depression     Stabilized on prozac 20mg qday.  Uses prn lorazepam.     Hiatal hernia      HTN (hypertension)      Hyponatremia     severe/ hospitalized when Tx for H. pylori/ ulcer.  No longer on HCTZ.      "Macular degeneration     Wet with submacular hem R eye.  Avastin shots given x4 8/11 - 1/12.     Osteoporosis     4/13 DEXA S-1.9/F-3.5.  alendronate for two years, then teeth fell out.  Prolia x2 infections,then Reclast started 12/13.  No fracture Hx.       Shingles 2009    Thorasic, no post herpetic neuralgia.     PSHx:    Past Surgical History:   Procedure Laterality Date     TONSILLECTOMY, ADENOIDECTOMY, MYRINGOTOMY, INSERT TUBE BILATERAL, COMBINED      No other surgeries.       Immunizations:   Immunization History   Administered Date(s) Administered     COVID-19,PF,Moderna 01/27/2021, 02/24/2021     Flu, Unspecified 09/01/2014     Influenza (High Dose) 3 valent vaccine 09/14/2015, 11/08/2016, 10/16/2017, 09/25/2018, 10/03/2019     Pneumo Conj 13-V (2010&after) 12/05/2014     Pneumococcal 23 valent 03/10/2017       ROS A comprehensive review of systems was performed and was otherwise negative    Medications, allergies, and problem list were reviewed and updated    Exam  /68   Pulse 88   Temp 98.2  F (36.8  C) (Oral)   Resp 18   Ht 1.619 m (5' 3.75\")   SpO2 90%   BMI 19.20 kg/m    He does not have significant pharyngitis, no thrush.  Lungs with decreased breath sounds throughout with decreased respiratory excursion.  Slight expiratory wheezing.  No crackles or dullness.  Heart is regular, no murmur.  Heart tones distant.  Abdomen soft nontender.  No lower extremity edema has resolved, was trace to +1 last night.  But no edema today.    Assessment/Plan  1. COPD exacerbation (H)  I suspect viral bronchitis with COPD exacerbation.  Significant hypoxia persistent.  Patient declined going to the hospital at this time, but if worsening hypoxia, weakness or dyspnea, I did recommend she go to the emergency room for immediate evaluation.    She is DNR/DNI.    I did review the chest x-ray images myself, did not see an acute infiltrate.  She was hyperinflated.  Await radiology review.    Start azithromycin for " bronchitis with COPD exacerbation.  Continue on the higher dose of prednisone with taper as below.    Symbicort and albuterol.  Family will monitor patient closely.  - XR Chest 2 Views  - azithromycin (ZITHROMAX) 250 MG tablet; Take 2 tablets (500 mg) by mouth daily for 1 day, THEN 1 tablet (250 mg) daily for 4 days.  Dispense: 6 tablet; Refill: 0  - predniSONE (DELTASONE) 20 MG tablet; Take 2 tablets, 40 mg, in the morning daily for 3 days, then 1 tablet daily for 3 days, then 1/2 tablet daily for 4 days then stop.  Dispense: 11 tablet; Refill: 0    2. Acute cough  Consistent with viral URI  - XR Chest 2 Views  - Influenza A/B antigen  - Symptomatic; Yes; 11/19/2022 COVID-19 Virus (Coronavirus) by PCR    3. Essential hypertension  Controlled.  Continue on current medications.  History of hyponatremia, on sodium chloride medication and taking diet currently.    4. Encounter for therapeutic drug monitoring  As above  - Basic metabolic panel    5. Anxiety  I have counseled patient to avoid lorazepam at this time with her work of breathing high and hypoxia.  I did discuss risk of respiratory depression with the lorazepam.  But if anxiety worsens, she has needed lorazepam in the past.  Continue Prozac.    Follow-up as needed for this illness, with follow-up in the spring for physical.    Macular degeneration controlled by ophthalmology      Return in about 5 months (around 4/22/2023) for Routine preventive.   Patient Instructions   I suspect you have a viral bronchitis with emphysema exacerbation.  Your oxygen levels are running low.    Continue on the higher dose prednisone, with taper, as prescribed.  Take 40 mg of prednisone again tomorrow morning, as prescribed.    Start azithromycin Z-Pedro now.    Make sure you are eating regular meals and keeping your strength up.    Continues albuterol as needed up to every 4 hours for shortness of breath.    If you are unable to catch her breath at rest, or getting more weak or  short of breath, you may need to go to the emergency room and get evaluated for oxygen.    You should be proving by next week.  Contact me if there is any worsening symptoms.    If your improvement proceeds as expected, see me in the spring for physical exam.    Usman Gaines MD, MD        Current Outpatient Medications   Medication Sig Dispense Refill     albuterol (PROAIR HFA;PROVENTIL HFA;VENTOLIN HFA) 90 mcg/actuation inhaler [ALBUTEROL (PROAIR HFA;PROVENTIL HFA;VENTOLIN HFA) 90 MCG/ACTUATION INHALER] Inhale 2 puffs every 6 (six) hours as needed for wheezing. 1 each 6     amLODIPine (NORVASC) 10 MG tablet TAKE 1 TABLET (10 MG TOTAL) BY MOUTH DAILY. 90 tablet 3     azithromycin (ZITHROMAX) 250 MG tablet Take 2 tablets (500 mg) by mouth daily for 1 day, THEN 1 tablet (250 mg) daily for 4 days. 6 tablet 0     budesonide-formoterol (SYMBICORT) 160-4.5 MCG/ACT Inhaler Inhale 2 puffs into the lungs 2 times daily 10.2 g 11     calcium carbonate (OS-NATALEE) 600 mg (1,500 mg) tablet [CALCIUM CARBONATE (OS-NATALEE) 600 MG (1,500 MG) TABLET] Take 600 mg by mouth daily.        cholecalciferol, vitamin D3, (VITAMIN D3) 2,000 unit Tab [CHOLECALCIFEROL, VITAMIN D3, (VITAMIN D3) 2,000 UNIT TAB] Once a day 100 each 3     Cranberry-Vitamin C-Vitamin E 140-100-3 MG-MG-UNIT CAPS Take 2 tablets by mouth daily       FLUoxetine (PROZAC) 20 MG capsule [FLUOXETINE (PROZAC) 20 MG CAPSULE] TAKE 1 CAPSULE BY MOUTH ONE TIME DAILY 90 capsule 3     ibuprofen (ADVIL,MOTRIN) 200 MG tablet [IBUPROFEN (ADVIL,MOTRIN) 200 MG TABLET] Take 200 mg by mouth every 6 (six) hours as needed for pain (takes 2-3 tablets twice a day for joint aches).       lisinopril (ZESTRIL) 40 MG tablet TAKE 1 TABLET (40 MG) BY MOUTH DAILY 90 tablet 3     LORazepam (ATIVAN) 0.5 MG tablet TAKE 1 TABLET BY MOUTH 2 TIMES DAILY AS NEEDED FOR ANXIETY 60 tablet 0     ondansetron (ZOFRAN ODT) 4 MG disintegrating tablet [ONDANSETRON (ZOFRAN ODT) 4 MG DISINTEGRATING TABLET] Take 1 tablet  (4 mg total) by mouth every 8 (eight) hours as needed for nausea. 30 tablet 1     predniSONE (DELTASONE) 20 MG tablet Take 2 tablets, 40 mg, in the morning daily for 3 days, then 1 tablet daily for 3 days, then 1/2 tablet daily for 4 days then stop. 11 tablet 0     predniSONE (DELTASONE) 20 MG tablet Take 1 tablet (20 mg) by mouth daily for 5 days 5 tablet 0     sodium chloride 1 GM tablet Take 1 tablet (1 g) by mouth daily 90 tablet 3     vitamin A-vitamin C-vit E-min (OCUVITE) Tab tablet Take 1 tablet by mouth daily       Allergies   Allergen Reactions     Sulfa (Sulfonamide Antibiotics) [Sulfa Drugs] Diarrhea and Nausea     Also rash, throat swelling     Incruse Ellipta [Umeclidinium] Angioedema     Mouth and tongue swelling     Social History     Tobacco Use     Smoking status: Former     Types: Cigarettes     Quit date: 2010     Years since quittin.9     Smokeless tobacco: Never   Vaping Use     Vaping Use: Never used   Substance Use Topics     Alcohol use: No     Drug use: No             Subjective   Suzanna is a 82 year old, presenting for the following health issues:  Shortness of Breath (Gets more short of breath with exertion. )      Shortness of Breath    History of Present Illness       COPD:  She presents for follow up of COPD.  Overall, COPD symptoms are much worse since last visit. She has more than usual fatigue or shortness of breath with exertion and a lot more than usual shortness of breath at rest.  She sometimes coughs and does not have change in sputum. Patient has had recent fever. She can walk the length of 3-5 rooms without stopping to rest. She can walk 1 flights of stairs without resting.The patient has had no ED, urgent care, or hospital admissions because of COPD since the last visit.     She eats 0-1 servings of fruits and vegetables daily.She consumes 0 sweetened beverage(s) daily.She exercises with enough effort to increase her heart rate 9 or less minutes per day.  She  exercises with enough effort to increase her heart rate 3 or less days per week.   She is taking medications regularly.             Review of Systems   Respiratory: Positive for shortness of breath.             Objective    Pulse 88   SpO2 90%   There is no height or weight on file to calculate BMI.  Physical Exam

## 2022-11-22 NOTE — PATIENT INSTRUCTIONS
I suspect you have a viral bronchitis with emphysema exacerbation.  Your oxygen levels are running low.    Continue on the higher dose prednisone, with taper, as prescribed.  Take 40 mg of prednisone again tomorrow morning, as prescribed.    Start azithromycin Z-Pedro now.    Make sure you are eating regular meals and keeping your strength up.    Continues albuterol as needed up to every 4 hours for shortness of breath.    If you are unable to catch her breath at rest, or getting more weak or short of breath, you may need to go to the emergency room and get evaluated for oxygen.    You should be proving by next week.  Contact me if there is any worsening symptoms.    If your improvement proceeds as expected, see me in the spring for physical exam.

## 2022-11-28 DIAGNOSIS — J43.9 PULMONARY EMPHYSEMA, UNSPECIFIED EMPHYSEMA TYPE (H): ICD-10-CM

## 2022-11-28 RX ORDER — ALBUTEROL SULFATE 90 UG/1
2 AEROSOL, METERED RESPIRATORY (INHALATION) EVERY 6 HOURS PRN
Qty: 18 G | Refills: 4 | Status: SHIPPED | OUTPATIENT
Start: 2022-11-28 | End: 2023-02-06

## 2022-12-07 ENCOUNTER — TELEPHONE (OUTPATIENT)
Dept: INTERNAL MEDICINE | Facility: CLINIC | Age: 82
End: 2022-12-07

## 2022-12-07 NOTE — TELEPHONE ENCOUNTER
I called patient at home.  She has been off prednisone for 2 days now.  Her dyspnea on exertion continues to improve.  She is back to walking down to the cafeteria with her walker.    No change in treatment plan.

## 2023-01-01 ENCOUNTER — OFFICE VISIT (OUTPATIENT)
Dept: INTERNAL MEDICINE | Facility: CLINIC | Age: 83
End: 2023-01-01
Payer: MEDICARE

## 2023-01-01 ENCOUNTER — NURSE TRIAGE (OUTPATIENT)
Dept: INTERNAL MEDICINE | Facility: CLINIC | Age: 83
End: 2023-01-01
Payer: MEDICARE

## 2023-01-01 ENCOUNTER — MEDICAL CORRESPONDENCE (OUTPATIENT)
Dept: HEALTH INFORMATION MANAGEMENT | Facility: CLINIC | Age: 83
End: 2023-01-01

## 2023-01-01 ENCOUNTER — VIRTUAL VISIT (OUTPATIENT)
Dept: PULMONOLOGY | Facility: CLINIC | Age: 83
End: 2023-01-01
Payer: MEDICARE

## 2023-01-01 VITALS
TEMPERATURE: 97.8 F | BODY MASS INDEX: 17.93 KG/M2 | DIASTOLIC BLOOD PRESSURE: 66 MMHG | OXYGEN SATURATION: 91 % | SYSTOLIC BLOOD PRESSURE: 132 MMHG | HEART RATE: 72 BPM | RESPIRATION RATE: 18 BRPM | HEIGHT: 64 IN | WEIGHT: 105 LBS

## 2023-01-01 VITALS
RESPIRATION RATE: 16 BRPM | HEART RATE: 73 BPM | HEIGHT: 64 IN | OXYGEN SATURATION: 91 % | WEIGHT: 104.13 LBS | SYSTOLIC BLOOD PRESSURE: 118 MMHG | TEMPERATURE: 97.8 F | BODY MASS INDEX: 17.78 KG/M2 | DIASTOLIC BLOOD PRESSURE: 68 MMHG

## 2023-01-01 DIAGNOSIS — L98.9 SKIN LESION: ICD-10-CM

## 2023-01-01 DIAGNOSIS — H35.30 MACULAR DEGENERATION (SENILE) OF RETINA: ICD-10-CM

## 2023-01-01 DIAGNOSIS — R73.9 HYPERGLYCEMIA: ICD-10-CM

## 2023-01-01 DIAGNOSIS — H35.30 MACULAR DEGENERATION, UNSPECIFIED LATERALITY, UNSPECIFIED TYPE: ICD-10-CM

## 2023-01-01 DIAGNOSIS — R73.03 PREDIABETES: ICD-10-CM

## 2023-01-01 DIAGNOSIS — Z29.11 NEED FOR PROPHYLACTIC VACCINATION AND INOCULATION AGAINST RESPIRATORY SYNCYTIAL VIRUS (RSV): ICD-10-CM

## 2023-01-01 DIAGNOSIS — Z23 NEED FOR COVID-19 VACCINE: ICD-10-CM

## 2023-01-01 DIAGNOSIS — E55.9 VITAMIN D DEFICIENCY: ICD-10-CM

## 2023-01-01 DIAGNOSIS — Z00.00 ENCOUNTER FOR WELLNESS EXAMINATION IN ADULT: Primary | ICD-10-CM

## 2023-01-01 DIAGNOSIS — Z23 NEED FOR IMMUNIZATION AGAINST INFLUENZA: ICD-10-CM

## 2023-01-01 DIAGNOSIS — F41.9 ANXIETY: ICD-10-CM

## 2023-01-01 DIAGNOSIS — J43.2 CENTRILOBULAR EMPHYSEMA (H): ICD-10-CM

## 2023-01-01 DIAGNOSIS — J43.9 PULMONARY EMPHYSEMA, UNSPECIFIED EMPHYSEMA TYPE (H): ICD-10-CM

## 2023-01-01 DIAGNOSIS — J96.11 CHRONIC RESPIRATORY FAILURE WITH HYPOXIA (H): ICD-10-CM

## 2023-01-01 DIAGNOSIS — M81.0 OSTEOPOROSIS WITHOUT CURRENT PATHOLOGICAL FRACTURE, UNSPECIFIED OSTEOPOROSIS TYPE: ICD-10-CM

## 2023-01-01 DIAGNOSIS — Z51.81 MEDICATION MONITORING ENCOUNTER: ICD-10-CM

## 2023-01-01 DIAGNOSIS — Z51.81 ENCOUNTER FOR THERAPEUTIC DRUG MONITORING: ICD-10-CM

## 2023-01-01 DIAGNOSIS — Z23 NEED FOR VACCINATION AGAINST STREPTOCOCCUS PNEUMONIAE: ICD-10-CM

## 2023-01-01 DIAGNOSIS — I10 ESSENTIAL HYPERTENSION: Primary | ICD-10-CM

## 2023-01-01 DIAGNOSIS — J44.1 COPD EXACERBATION (H): Primary | ICD-10-CM

## 2023-01-01 DIAGNOSIS — J44.9 COPD, GROUP C, BY GOLD 2017 CLASSIFICATION (H): Primary | ICD-10-CM

## 2023-01-01 DIAGNOSIS — R11.0 NAUSEA: ICD-10-CM

## 2023-01-01 DIAGNOSIS — I10 ESSENTIAL HYPERTENSION: ICD-10-CM

## 2023-01-01 LAB
ALBUMIN SERPL BCG-MCNC: 4.5 G/DL (ref 3.5–5.2)
ALP SERPL-CCNC: 47 U/L (ref 35–104)
ALT SERPL W P-5'-P-CCNC: 13 U/L (ref 10–35)
ANION GAP SERPL CALCULATED.3IONS-SCNC: 10 MMOL/L (ref 7–15)
ANION GAP SERPL CALCULATED.3IONS-SCNC: 14 MMOL/L (ref 7–15)
AST SERPL W P-5'-P-CCNC: 25 U/L (ref 10–35)
BILIRUB SERPL-MCNC: 0.5 MG/DL
BUN SERPL-MCNC: 10.8 MG/DL (ref 8–23)
BUN SERPL-MCNC: 11.7 MG/DL (ref 8–23)
CALCIUM SERPL-MCNC: 8.8 MG/DL (ref 8.8–10.2)
CALCIUM SERPL-MCNC: 9.4 MG/DL (ref 8.8–10.2)
CHLORIDE SERPL-SCNC: 97 MMOL/L (ref 98–107)
CHLORIDE SERPL-SCNC: 98 MMOL/L (ref 98–107)
CHOLEST SERPL-MCNC: 251 MG/DL
CHOLEST SERPL-MCNC: 252 MG/DL
CREAT SERPL-MCNC: 0.69 MG/DL (ref 0.51–0.95)
CREAT SERPL-MCNC: 0.71 MG/DL (ref 0.51–0.95)
DEPRECATED CALCIDIOL+CALCIFEROL SERPL-MC: 43 UG/L (ref 20–75)
DEPRECATED HCO3 PLAS-SCNC: 27 MMOL/L (ref 22–29)
DEPRECATED HCO3 PLAS-SCNC: 28 MMOL/L (ref 22–29)
EGFRCR SERPLBLD CKD-EPI 2021: 86 ML/MIN/1.73M2
ERYTHROCYTE [DISTWIDTH] IN BLOOD BY AUTOMATED COUNT: 14.1 % (ref 10–15)
GFR SERPL CREATININE-BSD FRML MDRD: 84 ML/MIN/1.73M2
GLUCOSE SERPL-MCNC: 112 MG/DL (ref 70–99)
GLUCOSE SERPL-MCNC: 136 MG/DL (ref 70–99)
HBA1C MFR BLD: 5.7 % (ref 0–5.6)
HBA1C MFR BLD: 5.7 % (ref 0–5.6)
HCT VFR BLD AUTO: 43.7 % (ref 35–47)
HDLC SERPL-MCNC: 87 MG/DL
HDLC SERPL-MCNC: 91 MG/DL
HGB BLD-MCNC: 13.7 G/DL (ref 11.7–15.7)
LDLC SERPL CALC-MCNC: 144 MG/DL
LDLC SERPL CALC-MCNC: 148 MG/DL
MCH RBC QN AUTO: 30.4 PG (ref 26.5–33)
MCHC RBC AUTO-ENTMCNC: 31.4 G/DL (ref 31.5–36.5)
MCV RBC AUTO: 97 FL (ref 78–100)
NONHDLC SERPL-MCNC: 160 MG/DL
NONHDLC SERPL-MCNC: 165 MG/DL
PLATELET # BLD AUTO: 322 10E3/UL (ref 150–450)
POTASSIUM SERPL-SCNC: 4.1 MMOL/L (ref 3.4–5.3)
POTASSIUM SERPL-SCNC: 4.2 MMOL/L (ref 3.4–5.3)
PROT SERPL-MCNC: 6.8 G/DL (ref 6.4–8.3)
RBC # BLD AUTO: 4.5 10E6/UL (ref 3.8–5.2)
SODIUM SERPL-SCNC: 136 MMOL/L (ref 135–145)
SODIUM SERPL-SCNC: 138 MMOL/L (ref 136–145)
TRIGL SERPL-MCNC: 81 MG/DL
TRIGL SERPL-MCNC: 86 MG/DL
WBC # BLD AUTO: 7.3 10E3/UL (ref 4–11)

## 2023-01-01 PROCEDURE — 36415 COLL VENOUS BLD VENIPUNCTURE: CPT | Performed by: INTERNAL MEDICINE

## 2023-01-01 PROCEDURE — G0008 ADMIN INFLUENZA VIRUS VAC: HCPCS | Performed by: INTERNAL MEDICINE

## 2023-01-01 PROCEDURE — 90662 IIV NO PRSV INCREASED AG IM: CPT | Performed by: INTERNAL MEDICINE

## 2023-01-01 PROCEDURE — G0439 PPPS, SUBSEQ VISIT: HCPCS | Performed by: INTERNAL MEDICINE

## 2023-01-01 PROCEDURE — 83036 HEMOGLOBIN GLYCOSYLATED A1C: CPT | Performed by: INTERNAL MEDICINE

## 2023-01-01 PROCEDURE — 99214 OFFICE O/P EST MOD 30 MIN: CPT | Mod: 25 | Performed by: INTERNAL MEDICINE

## 2023-01-01 PROCEDURE — 80061 LIPID PANEL: CPT | Performed by: INTERNAL MEDICINE

## 2023-01-01 PROCEDURE — 85027 COMPLETE CBC AUTOMATED: CPT | Performed by: INTERNAL MEDICINE

## 2023-01-01 PROCEDURE — 80053 COMPREHEN METABOLIC PANEL: CPT | Performed by: INTERNAL MEDICINE

## 2023-01-01 PROCEDURE — 80048 BASIC METABOLIC PNL TOTAL CA: CPT | Performed by: INTERNAL MEDICINE

## 2023-01-01 PROCEDURE — 90480 ADMN SARSCOV2 VAC 1/ONLY CMP: CPT | Performed by: INTERNAL MEDICINE

## 2023-01-01 PROCEDURE — 99214 OFFICE O/P EST MOD 30 MIN: CPT | Mod: VID | Performed by: NURSE PRACTITIONER

## 2023-01-01 PROCEDURE — 91320 SARSCV2 VAC 30MCG TRS-SUC IM: CPT | Performed by: INTERNAL MEDICINE

## 2023-01-01 PROCEDURE — 82306 VITAMIN D 25 HYDROXY: CPT | Performed by: INTERNAL MEDICINE

## 2023-01-01 PROCEDURE — 90677 PCV20 VACCINE IM: CPT | Performed by: INTERNAL MEDICINE

## 2023-01-01 PROCEDURE — G0009 ADMIN PNEUMOCOCCAL VACCINE: HCPCS | Performed by: INTERNAL MEDICINE

## 2023-01-01 RX ORDER — ONDANSETRON 4 MG/1
4 TABLET, ORALLY DISINTEGRATING ORAL EVERY 8 HOURS PRN
Qty: 30 TABLET | Refills: 1 | Status: SHIPPED | OUTPATIENT
Start: 2023-01-01 | End: 2024-01-01

## 2023-01-01 RX ORDER — PREDNISONE 20 MG/1
20 TABLET ORAL DAILY
Qty: 5 TABLET | Refills: 0 | Status: SHIPPED | OUTPATIENT
Start: 2023-01-01 | End: 2023-01-01

## 2023-01-01 RX ORDER — AZITHROMYCIN 250 MG/1
TABLET, FILM COATED ORAL
Qty: 6 TABLET | Refills: 0 | Status: SHIPPED | OUTPATIENT
Start: 2023-01-01 | End: 2023-01-01

## 2023-01-01 ASSESSMENT — ENCOUNTER SYMPTOMS
NERVOUS/ANXIOUS: 0
BREAST MASS: 0
DIARRHEA: 0
CONSTIPATION: 0
CHILLS: 0
ARTHRALGIAS: 0
ABDOMINAL PAIN: 0
HEMATOCHEZIA: 0
FEVER: 0
WEAKNESS: 1
DYSURIA: 0
HEADACHES: 0
PALPITATIONS: 0
SHORTNESS OF BREATH: 1
MYALGIAS: 0
HEARTBURN: 0
PARESTHESIAS: 0
FREQUENCY: 1
EYE PAIN: 0
HEMATURIA: 0
SORE THROAT: 0
JOINT SWELLING: 0
COUGH: 0
NAUSEA: 0
DIZZINESS: 0

## 2023-01-01 ASSESSMENT — ACTIVITIES OF DAILY LIVING (ADL)
CURRENT_FUNCTION: MONEY MANAGEMENT REQUIRES ASSISTANCE
CURRENT_FUNCTION: SHOPPING REQUIRES ASSISTANCE
CURRENT_FUNCTION: HOUSEWORK REQUIRES ASSISTANCE
CURRENT_FUNCTION: TRANSPORTATION REQUIRES ASSISTANCE
CURRENT_FUNCTION: PREPARING MEALS REQUIRES ASSISTANCE
CURRENT_FUNCTION: TELEPHONE REQUIRES ASSISTANCE
CURRENT_FUNCTION: LAUNDRY REQUIRES ASSISTANCE

## 2023-01-02 ENCOUNTER — NURSE TRIAGE (OUTPATIENT)
Dept: INTERNAL MEDICINE | Facility: CLINIC | Age: 83
End: 2023-01-02

## 2023-01-02 NOTE — TELEPHONE ENCOUNTER
Patient called with bilat ankle and foot edema that started 1.5 weeks ago. Last occurrence of edema was 8 years ago after a long flight. Denied increased SOB from baseline, patient has COPD. Denied chest pain. Patient can only fit in to sandals due to the swelling. Denied weeping. I asked her to check for pitting but she is unable to see due to being legally blind. Feet and ankles are warm and dry to touch. She has been elevating, wearing compression stocking, massage since onset with minimal symptom relief. She stopped taking her sodium chloride tablet 5 days ago. Able to ambulate at baseline.     I discussed with patient need for evaluation today and available appointments. Patient stated that it is very difficult for her to come in but not impossible. I further discussed the importance of in person eval and patient refused unless her pcp says it is absolutely necessary.     Routing to pcp to review and advise.     Frida Muniz RN      Reason for Disposition    Swelling of both ankles (i.e., pedal edema)    MODERATE swelling of both ankles (e.g., swelling extends up to the knees) AND new-onset or worsening    Additional Information    Negative: Sounds like a life-threatening emergency to the triager    Negative: Chest pain    Negative: Small area of swelling and followed an insect bite to the area    Negative: Followed a knee injury    Negative: Ankle or foot injury    Negative: Pregnant with leg swelling or edema    Negative: Difficulty breathing at rest    Negative: Entire foot is cool or blue in comparison to other side    Negative: SEVERE swelling (e.g., swelling extends above knee, entire leg is swollen, weeping fluid)    Negative: Thigh or calf pain and only 1 side and present > 1 hour    Negative: Thigh, calf, or ankle swelling in only one leg    Negative: Thigh, calf, or ankle swelling in both legs, but one side is definitely more swollen (Exception: longstanding difference between legs)    Negative:  Cast on leg or ankle and has increasing pain    Negative: Can't walk or can barely stand (new-onset)    Negative: Fever and red area (or area very tender to touch)    Negative: Patient sounds very sick or weak to the triager    Negative: Swelling of face, arm or hands  (Exception: Slight puffiness of fingers during hot weather.)    Negative: Pregnant 20 or more weeks and sudden weight gain (i.e., > 2 lbs, 1 kg in one week)    Protocols used: ANKLE SWELLING-A-OH, LEG SWELLING AND EDEMA-A-OH

## 2023-01-02 NOTE — TELEPHONE ENCOUNTER
Outgoing Call:    Dr. Gaines's message relayed to pt  Pt verbalized understanding   Pt scheduled for 1.5.23 @ 3:40 appt with Dr. Gaines  Pt had no further questions or concerns.     Naomi VALENTIN RN  [facility]

## 2023-01-02 NOTE — TELEPHONE ENCOUNTER
I have her stay off the sodium chloride pill.  Make sure she is not taking any ibuprofen or Aleve, that can cause leg swelling.    Keep legs elevated when able and avoid prolonged sitting in chair.  Increase daily walking.    If her blood pressure is less than 140/85, have her reduce the amlodipine down to 5 mg a day.  Amlodipine can promote leg swelling.    She should be off her prednisone at this time.  Prednisone can cause leg swelling, but she should be off of that currently.  Confirm that she is off her prednisone.    Arrange for follow-up appointment sometime this week in clinic.

## 2023-01-16 ENCOUNTER — OFFICE VISIT (OUTPATIENT)
Dept: INTERNAL MEDICINE | Facility: CLINIC | Age: 83
End: 2023-01-16
Payer: MEDICARE

## 2023-01-16 VITALS
BODY MASS INDEX: 18.27 KG/M2 | HEIGHT: 64 IN | DIASTOLIC BLOOD PRESSURE: 60 MMHG | WEIGHT: 107 LBS | OXYGEN SATURATION: 90 % | SYSTOLIC BLOOD PRESSURE: 110 MMHG | RESPIRATION RATE: 16 BRPM | HEART RATE: 84 BPM | TEMPERATURE: 97.4 F

## 2023-01-16 DIAGNOSIS — J96.11 CHRONIC RESPIRATORY FAILURE WITH HYPOXIA (H): ICD-10-CM

## 2023-01-16 DIAGNOSIS — J44.9 CHRONIC OBSTRUCTIVE PULMONARY DISEASE, UNSPECIFIED COPD TYPE (H): Primary | ICD-10-CM

## 2023-01-16 DIAGNOSIS — F41.9 ANXIETY: ICD-10-CM

## 2023-01-16 DIAGNOSIS — I10 ESSENTIAL HYPERTENSION: ICD-10-CM

## 2023-01-16 DIAGNOSIS — R60.0 BILATERAL LOWER EXTREMITY EDEMA: ICD-10-CM

## 2023-01-16 DIAGNOSIS — I27.20 PULMONARY HYPERTENSION (H): ICD-10-CM

## 2023-01-16 PROCEDURE — 99214 OFFICE O/P EST MOD 30 MIN: CPT | Performed by: INTERNAL MEDICINE

## 2023-01-16 RX ORDER — LORAZEPAM 0.5 MG/1
TABLET ORAL
Qty: 60 TABLET | Refills: 0 | Status: CANCELLED | OUTPATIENT
Start: 2023-01-16

## 2023-01-16 RX ORDER — AMLODIPINE BESYLATE 5 MG/1
5 TABLET ORAL DAILY
Qty: 90 TABLET | Refills: 3 | Status: SHIPPED | OUTPATIENT
Start: 2023-01-16 | End: 2024-01-01

## 2023-01-16 RX ORDER — BUDESONIDE AND FORMOTEROL FUMARATE DIHYDRATE 160; 4.5 UG/1; UG/1
2 AEROSOL RESPIRATORY (INHALATION) 2 TIMES DAILY
Qty: 10.2 G | Refills: 11 | Status: SHIPPED | OUTPATIENT
Start: 2023-01-16 | End: 2024-01-01

## 2023-01-16 RX ORDER — LISINOPRIL 40 MG/1
40 TABLET ORAL DAILY
Qty: 90 TABLET | Refills: 3 | Status: SHIPPED | OUTPATIENT
Start: 2023-01-16 | End: 2024-01-01

## 2023-01-16 NOTE — PATIENT INSTRUCTIONS
I suspect you have pulmonary hypertension as a cause of your leg swelling.  Pulmonary hypertension is from your lung disease, which was made worse by the recent infection earlier this winter.    Wearing oxygen to keep your oxygen saturations above 90% can help improve blood flow through your lungs, reduce shortness of breath and reduce leg swelling.    Start wearing oxygen nasal cannula 2 L.  This does work better if you can use it 24 hours a day.  Try to use your oxygen whenever you can, but especially when you are walking.  Oxygen can help open up the arterial flow through your lungs and improve blood flow through your lungs.    Continue to walk on a daily basis will help improve your conditioning and help control leg swelling.    Reduce amlodipine down to 5 mg a day, as your blood pressure is on the low side, and amlodipine can contribute to leg swelling.    Schedule a heart echo for evaluation of your heart function and further evaluation for pulmonary hypertension.    Avoid lorazepam, which can worsen pulmonary hypertension.    I do not suspect you have an allergy to the albuterol inhaler.  Rinse your mouth out after using with water.  You can try a different brand of albuterol inhaler if you continue to have problems.

## 2023-01-16 NOTE — PROGRESS NOTES
Celeste Peguero   82 year old female    Date of Visit: 1/16/2023    Chief Complaint   Patient presents with     Edema     Bilateral feet off and on for 1 month     Subjective  Patient is coming in for further evaluation of persistent lower extremity swelling.    Patient had upper respiratory illness in November of last year treated with prednisone and azithromycin.  COVID and influenza negative.  Chest x-ray was negative for infiltrate or effusion on November 2022.    She is off prednisone.    She has felt she is returned to baseline for breathing.  Continues on Symbicort inhaler twice a day.  Has been using albuterol inhaler as needed.  She does state that she feels her tongue is swollen or irritated after using the albuterol.  She has been on albuterol for years.    She has been trying to walk more, but admits to being somewhat more sedentary.    Has not been on oxygen.  Her oxygen saturations have been borderline in clinic since her illness.    No chest pain or chest pressure.    Has not used a diuretic.    Is no longer on sodium chloride pill.  Was eating some higher salt foods such as TV dinners but has cut down on that some.  Sodium level was normal at 137 in November.    Past history of hyponatremia associate with anxiety and polydipsia.    Hypertension has been controlled, blood pressures been somewhat lower than in the past.  She denies lightheaded dizzy spells.  She is on amlodipine 10 mg a day and lisinopril 40 mg a day.    Normal TSH and hemoglobin a year ago.  No bleeding issues.    She is not taking ibuprofen or Aleve.    History of severe macular degeneration with near blindness.  Walks with walker.    History of chronic anxiety.  She states she just uses the lorazepam rarely but is asking for refill.  She has not had recent falls.  He does not have a diagnosis of sleep apnea.    PMHx:    Past Medical History:   Diagnosis Date     Acute H. pylori gastric ulcer 2010    Treated with no  "further Sx     Anxiety and depression     Stabilized on prozac 20mg qday.  Uses prn lorazepam.     Hiatal hernia      HTN (hypertension)      Hyponatremia     severe/ hospitalized when Tx for H. pylori/ ulcer.  No longer on HCTZ.     Macular degeneration     Wet with submacular hem R eye.  Avastin shots given x4 8/11 - 1/12.     Osteoporosis     4/13 DEXA S-1.9/F-3.5.  alendronate for two years, then teeth fell out.  Prolia x2 infections,then Reclast started 12/13.  No fracture Hx.       Shingles 2009    Thorasic, no post herpetic neuralgia.     PSHx:    Past Surgical History:   Procedure Laterality Date     TONSILLECTOMY, ADENOIDECTOMY, MYRINGOTOMY, INSERT TUBE BILATERAL, COMBINED      No other surgeries.       Immunizations:   Immunization History   Administered Date(s) Administered     COVID-19 Vaccine 18+ (Moderna) 01/27/2021, 02/24/2021     Flu, Unspecified 09/01/2014     Influenza (High Dose) 3 valent vaccine 09/14/2015, 11/08/2016, 10/16/2017, 09/25/2018, 10/03/2019     Pneumo Conj 13-V (2010&after) 12/05/2014     Pneumococcal 23 valent 03/10/2017       ROS A comprehensive review of systems was performed and was otherwise negative    Medications, allergies, and problem list were reviewed and updated    Exam  /60 (BP Location: Right arm, Patient Position: Sitting)   Pulse 84   Temp 97.4  F (36.3  C)   Resp 16   Ht 1.619 m (5' 3.75\")   Wt 48.5 kg (107 lb)   SpO2 90%   BMI 18.51 kg/m    Decreased breath sounds throughout but no wheezing or crackles.  +1 lower extremity edema to the midshin bilaterally.  No skin breakdown.  Abdomen soft, nonobese.  Heart is regular without murmur.  No gallop.    Oxygen saturation on room air at rest was 86%.  Oxygen saturation with ambulation on room air was 85%.    Oxygen saturation at rest on oxygen 2 L nasal cannula was 94%.  Oxygen saturation on 2 L nasal cannula with ambulation was 95%.  Patient had symptomatic benefit with use of oxygen while " walking.    Assessment/Plan  1. Chronic obstructive pulmonary disease, unspecified COPD type (H)  Severe COPD with exacerbation last November and has not fully recovered, suspect some pulmonary hypertension worsening has developed.    No evidence of ongoing infection.  Continue Symbicort.  Continue albuterol inhaler as needed.    I suspect the albuterol may be irritating her mouth, but she does not have thrush.  I would not suspect an allergic reaction with albuterol.  She could try changing brands albuterol if needed.  I will have her rinse the mouth out with water or gargle after her inhalers.  - budesonide-formoterol (SYMBICORT) 160-4.5 MCG/ACT Inhaler; Inhale 2 puffs into the lungs 2 times daily  Dispense: 10.2 g; Refill: 11  - Home Oxygen Order for DME - ONLY FOR DME    Patient is still DNR/DNI    2. Bilateral lower extremity edema, worsening condition requiring further evaluation  Increasing lower extremity edema consistent with pulmonary hypertension that is developed since her COPD exacerbation last November.    Not severe enough for diuretic at this time.    Further evaluate for pulmonary hypertension with echo  - Echocardiogram Complete; Future    3. Pulmonary hypertension (H)  Suspected pulmonary hypertension with her COPD, exacerbated since COPD exacerbation in November.    She does qualify for home oxygen and will initiate.    Long discussion with patient about home oxygen, and benefits with pulmonary hypertension.  Also discussion on how her lower extremity edema is associated with her lung condition.  - Home Oxygen Order for DME - ONLY FOR DME    2 L nasal cannula continuous.    4. Essential hypertension    History of hyponatremia  Blood pressure on the low side, likely associate with her worsening pulmonary hypertension.  With worsening edema reduce amlodipine down to 5 mg a day.  Continue lisinopril at current dose.    History of low sodium, no longer on sodium chloride pill.  Consider recheck of  labs next visit.  - lisinopril (ZESTRIL) 40 MG tablet; Take 1 tablet (40 mg) by mouth daily  Dispense: 90 tablet; Refill: 3  - amLODIPine (NORVASC) 5 MG tablet; Take 1 tablet (5 mg) by mouth daily  Dispense: 90 tablet; Refill: 3    5. Anxiety  Chronic.  Moderate severity but currently stable.  I counseled patient to avoid lorazepam, especially with her pulmonary hypertension condition.  That may cause respiratory depression and worsening hypoxia and pulmonary hypertension.    Patient has had severe anxiety in the past and may need occasional lorazepam for severe anxiety spells, but I did explain risk of that medication.    Continue on fluoxetine at this time daily  - FLUoxetine (PROZAC) 20 MG capsule; [FLUOXETINE (PROZAC) 20 MG CAPSULE] TAKE 1 CAPSULE BY MOUTH ONE TIME DAILY  Dispense: 90 capsule; Refill: 3    6. Chronic respiratory failure with hypoxia (H)  As above.  2 L nasal cannula continuous.  - Home Oxygen Order for DME - ONLY FOR DME    Macular degeneration, managed by ophthalmology.      Return in about 2 weeks (around 1/30/2023) for Follow up.   Patient Instructions   I suspect you have pulmonary hypertension as a cause of your leg swelling.  Pulmonary hypertension is from your lung disease, which was made worse by the recent infection earlier this winter.    Wearing oxygen to keep your oxygen saturations above 90% can help improve blood flow through your lungs, reduce shortness of breath and reduce leg swelling.    Start wearing oxygen nasal cannula 2 L.  This does work better if you can use it 24 hours a day.  Try to use your oxygen whenever you can, but especially when you are walking.  Oxygen can help open up the arterial flow through your lungs and improve blood flow through your lungs.    Continue to walk on a daily basis will help improve your conditioning and help control leg swelling.    Reduce amlodipine down to 5 mg a day, as your blood pressure is on the low side, and amlodipine can contribute  to leg swelling.    Schedule a heart echo for evaluation of your heart function and further evaluation for pulmonary hypertension.    Avoid lorazepam, which can worsen pulmonary hypertension.    I do not suspect you have an allergy to the albuterol inhaler.  Rinse your mouth out after using with water.  You can try a different brand of albuterol inhaler if you continue to have problems.    Usman Gaines MD, MD        Current Outpatient Medications   Medication Sig Dispense Refill     albuterol (PROAIR HFA/PROVENTIL HFA/VENTOLIN HFA) 108 (90 Base) MCG/ACT inhaler Inhale 2 puffs into the lungs every 6 hours as needed for shortness of breath / dyspnea or wheezing 18 g 4     amLODIPine (NORVASC) 5 MG tablet Take 1 tablet (5 mg) by mouth daily 90 tablet 3     budesonide-formoterol (SYMBICORT) 160-4.5 MCG/ACT Inhaler Inhale 2 puffs into the lungs 2 times daily 10.2 g 11     calcium carbonate (OS-NATALEE) 600 mg (1,500 mg) tablet [CALCIUM CARBONATE (OS-NATALEE) 600 MG (1,500 MG) TABLET] Take 600 mg by mouth daily.        cholecalciferol, vitamin D3, (VITAMIN D3) 2,000 unit Tab [CHOLECALCIFEROL, VITAMIN D3, (VITAMIN D3) 2,000 UNIT TAB] Once a day 100 each 3     Cranberry-Vitamin C-Vitamin E 140-100-3 MG-MG-UNIT CAPS Take 2 tablets by mouth daily       FLUoxetine (PROZAC) 20 MG capsule [FLUOXETINE (PROZAC) 20 MG CAPSULE] TAKE 1 CAPSULE BY MOUTH ONE TIME DAILY 90 capsule 3     lisinopril (ZESTRIL) 40 MG tablet Take 1 tablet (40 mg) by mouth daily 90 tablet 3     LORazepam (ATIVAN) 0.5 MG tablet TAKE 1 TABLET BY MOUTH 2 TIMES DAILY AS NEEDED FOR ANXIETY 60 tablet 0     ondansetron (ZOFRAN ODT) 4 MG disintegrating tablet [ONDANSETRON (ZOFRAN ODT) 4 MG DISINTEGRATING TABLET] Take 1 tablet (4 mg total) by mouth every 8 (eight) hours as needed for nausea. 30 tablet 1     vitamin A-vitamin C-vit E-min (OCUVITE) Tab tablet Take 1 tablet by mouth daily       Allergies   Allergen Reactions     Sulfa (Sulfonamide Antibiotics) [Sulfa Drugs]  Diarrhea and Nausea     Also rash, throat swelling     Incruse Ellipta [Umeclidinium] Angioedema     Mouth and tongue swelling     Social History     Tobacco Use     Smoking status: Former     Types: Cigarettes     Quit date: 2010     Years since quittin.1     Smokeless tobacco: Never   Vaping Use     Vaping Use: Never used   Substance Use Topics     Alcohol use: No     Drug use: No             Subjective   Suzanna is a 82 year old, presenting for the following health issues:  Edema (Bilateral feet off and on for 1 month)      History of Present Illness       Reason for visit:  Foot edema  Symptom onset:  More than a month  Symptoms include:  Swollen and painful off and on  Symptom intensity:  Mild  Symptom progression:  Staying the same  Had these symptoms before:  No  What makes it better:  Elevation    She eats 2-3 servings of fruits and vegetables daily.She consumes 0 sweetened beverage(s) daily.She exercises with enough effort to increase her heart rate 9 or less minutes per day.  She exercises with enough effort to increase her heart rate 7 days per week.   She is taking medications regularly.             Review of Systems         Objective    There were no vitals taken for this visit.  There is no height or weight on file to calculate BMI.  Physical Exam

## 2023-01-18 ENCOUNTER — TELEPHONE (OUTPATIENT)
Dept: INTERNAL MEDICINE | Facility: CLINIC | Age: 83
End: 2023-01-18
Payer: MEDICARE

## 2023-01-18 NOTE — TELEPHONE ENCOUNTER
Spoke to Jenn at [facility]. He was able to pull up patient's info and able to see the notes and DME order from 1/16/23. Jenn stated that they will contact the patient and go from there. If they need anything from PCP, they will contact clinic.    JOANIE BanksN, RN  [facility]

## 2023-01-18 NOTE — TELEPHONE ENCOUNTER
Outbound call to Pt. She has not heard anything regarding home O2.   Will follow-up with DME supplier - order was placed at last office visit on 1/16/23.    Kendy Haney RN, BSN  [facility]

## 2023-01-18 NOTE — TELEPHONE ENCOUNTER
Home oxygen was ordered on January 16.  Please confirm that this has been delivered and set up already, otherwise determine what needs to be done to get oxygen at home.

## 2023-01-31 ENCOUNTER — HOSPITAL ENCOUNTER (OUTPATIENT)
Dept: CARDIOLOGY | Facility: CLINIC | Age: 83
Discharge: HOME | End: 2023-01-31
Attending: INTERNAL MEDICINE | Admitting: INTERNAL MEDICINE
Payer: MEDICARE

## 2023-01-31 DIAGNOSIS — R60.0 BILATERAL LOWER EXTREMITY EDEMA: ICD-10-CM

## 2023-01-31 LAB — LVEF ECHO: NORMAL

## 2023-01-31 PROCEDURE — 93306 TTE W/DOPPLER COMPLETE: CPT

## 2023-01-31 PROCEDURE — 93306 TTE W/DOPPLER COMPLETE: CPT | Mod: 26 | Performed by: INTERNAL MEDICINE

## 2023-02-06 ENCOUNTER — OFFICE VISIT (OUTPATIENT)
Dept: INTERNAL MEDICINE | Facility: CLINIC | Age: 83
End: 2023-02-06
Payer: MEDICARE

## 2023-02-06 VITALS
SYSTOLIC BLOOD PRESSURE: 128 MMHG | HEART RATE: 78 BPM | OXYGEN SATURATION: 85 % | BODY MASS INDEX: 18.68 KG/M2 | WEIGHT: 108 LBS | DIASTOLIC BLOOD PRESSURE: 76 MMHG

## 2023-02-06 DIAGNOSIS — J44.9 CHRONIC OBSTRUCTIVE PULMONARY DISEASE, UNSPECIFIED COPD TYPE (H): Primary | ICD-10-CM

## 2023-02-06 DIAGNOSIS — I10 ESSENTIAL HYPERTENSION: ICD-10-CM

## 2023-02-06 DIAGNOSIS — J43.9 PULMONARY EMPHYSEMA, UNSPECIFIED EMPHYSEMA TYPE (H): ICD-10-CM

## 2023-02-06 DIAGNOSIS — J96.11 CHRONIC RESPIRATORY FAILURE WITH HYPOXIA (H): ICD-10-CM

## 2023-02-06 DIAGNOSIS — R60.0 BILATERAL LOWER EXTREMITY EDEMA: ICD-10-CM

## 2023-02-06 DIAGNOSIS — R53.83 FATIGUE, UNSPECIFIED TYPE: ICD-10-CM

## 2023-02-06 DIAGNOSIS — Z51.81 ENCOUNTER FOR THERAPEUTIC DRUG MONITORING: ICD-10-CM

## 2023-02-06 LAB
ALBUMIN SERPL BCG-MCNC: 4.5 G/DL (ref 3.5–5.2)
ALP SERPL-CCNC: 46 U/L (ref 35–104)
ALT SERPL W P-5'-P-CCNC: 10 U/L (ref 10–35)
ANION GAP SERPL CALCULATED.3IONS-SCNC: 11 MMOL/L (ref 7–15)
AST SERPL W P-5'-P-CCNC: 17 U/L (ref 10–35)
BILIRUB SERPL-MCNC: 0.2 MG/DL
BUN SERPL-MCNC: 8.3 MG/DL (ref 8–23)
CALCIUM SERPL-MCNC: 9 MG/DL (ref 8.8–10.2)
CHLORIDE SERPL-SCNC: 98 MMOL/L (ref 98–107)
CREAT SERPL-MCNC: 0.65 MG/DL (ref 0.51–0.95)
DEPRECATED HCO3 PLAS-SCNC: 33 MMOL/L (ref 22–29)
ERYTHROCYTE [DISTWIDTH] IN BLOOD BY AUTOMATED COUNT: 13.3 % (ref 10–15)
GFR SERPL CREATININE-BSD FRML MDRD: 87 ML/MIN/1.73M2
GLUCOSE SERPL-MCNC: 125 MG/DL (ref 70–99)
HCT VFR BLD AUTO: 43 % (ref 35–47)
HGB BLD-MCNC: 13.5 G/DL (ref 11.7–15.7)
MCH RBC QN AUTO: 30.5 PG (ref 26.5–33)
MCHC RBC AUTO-ENTMCNC: 31.4 G/DL (ref 31.5–36.5)
MCV RBC AUTO: 97 FL (ref 78–100)
PLATELET # BLD AUTO: 276 10E3/UL (ref 150–450)
POTASSIUM SERPL-SCNC: 4.2 MMOL/L (ref 3.4–5.3)
PROT SERPL-MCNC: 6.4 G/DL (ref 6.4–8.3)
RBC # BLD AUTO: 4.42 10E6/UL (ref 3.8–5.2)
SODIUM SERPL-SCNC: 142 MMOL/L (ref 136–145)
TSH SERPL DL<=0.005 MIU/L-ACNC: 4.07 UIU/ML (ref 0.3–4.2)
WBC # BLD AUTO: 6.6 10E3/UL (ref 4–11)

## 2023-02-06 PROCEDURE — 84443 ASSAY THYROID STIM HORMONE: CPT | Performed by: INTERNAL MEDICINE

## 2023-02-06 PROCEDURE — 80053 COMPREHEN METABOLIC PANEL: CPT | Performed by: INTERNAL MEDICINE

## 2023-02-06 PROCEDURE — 36415 COLL VENOUS BLD VENIPUNCTURE: CPT | Performed by: INTERNAL MEDICINE

## 2023-02-06 PROCEDURE — 99214 OFFICE O/P EST MOD 30 MIN: CPT | Performed by: INTERNAL MEDICINE

## 2023-02-06 PROCEDURE — 85027 COMPLETE CBC AUTOMATED: CPT | Performed by: INTERNAL MEDICINE

## 2023-02-06 RX ORDER — ALBUTEROL SULFATE 90 UG/1
2 AEROSOL, METERED RESPIRATORY (INHALATION) EVERY 6 HOURS PRN
Qty: 18 G | Refills: 4 | Status: SHIPPED | OUTPATIENT
Start: 2023-02-06

## 2023-02-06 NOTE — PATIENT INSTRUCTIONS
Continue on the lower dose of amlodipine 5 mg a day.  If your blood pressure does get above 140/85, contact me.    Use your oxygen 2 L nasal cannula all the time.  Get a portable oxygen system that works for you.    Continue your daily walking and activity.    Try a different brand of albuterol, and rinse your mouth out right after using it.  Be careful not to spray the albuterol on your tongue when you inhale it.    Follow-up on April 25 for your physical.

## 2023-02-06 NOTE — PROGRESS NOTES
Celeste VELÁZQUEZ Yong Peguero   82 year old female    Date of Visit: 2/6/2023    Chief Complaint   Patient presents with     Follow Up     3 wk follow up, wants to discuss portable oxygen.     Lamine  Suzanna is here for follow-up of her emphysema and lower extremity edema.    Patient had a URI treated with prednisone and azithromycin in November of last year, COVID and influenza negative.  Chest x-ray negative at that time.    She continues on Symbicort.  She does take that daily.    She is not using albuterol currently.  She stated that that caused some tongue swelling symptoms for her earlier.  She did not try a different brand.  She had the same type of symptoms on Incruse Ellipta in the past.    No tongue symptoms now.    Patient was having lower extremity edema.  It is improved now so she has been using oxygen.    Patient had significant hypoxia on room air, exacerbated by ambulation.  She was started on continuous home oxygen 2 L nasal cannula on January 16 and has been compliant at home with that.    She did arrive without her portable unit as she states the portable tanks are too heavy for her to manage.  O2 sat was 85% on room air on arrival.    No chest pain or palpitations.    She still not taking her sodium chloride supplement.    She did reduce her amlodipine down to 5 mg a day last month, as her blood pressure was on the low side at 110/60 at the time.  She has not checked her blood pressure since then.  But blood pressure is still normal range currently.    She denies new lightheaded dizziness.    Does not use regular NSAIDs.    On lisinopril 40 mg.    Chronic anxiety.  I have counseled her to avoid lorazepam.  Still on Prozac.    History of severe macular degeneration with near blindness, stable.  Uses walker.  No falls.    I did review her heart echo from January 31 with normal ejection fraction 60-65%.  It was normal RV function.  No right atrial increased pressures.  No evidence of pulmonary  hypertension.  They were not able to evaluate the pulmonary valve, however.  No valve problems noted.  No signs of LVH.    PMHx:    Past Medical History:   Diagnosis Date     Acute H. pylori gastric ulcer 2010    Treated with no further Sx     Anxiety and depression     Stabilized on prozac 20mg qday.  Uses prn lorazepam.     Hiatal hernia      HTN (hypertension)      Hyponatremia     severe/ hospitalized when Tx for H. pylori/ ulcer.  No longer on HCTZ.     Macular degeneration     Wet with submacular hem R eye.  Avastin shots given x4 8/11 - 1/12.     Osteoporosis     4/13 DEXA S-1.9/F-3.5.  alendronate for two years, then teeth fell out.  Prolia x2 infections,then Reclast started 12/13.  No fracture Hx.       Shingles 2009    Thorasic, no post herpetic neuralgia.     PSHx:    Past Surgical History:   Procedure Laterality Date     TONSILLECTOMY, ADENOIDECTOMY, MYRINGOTOMY, INSERT TUBE BILATERAL, COMBINED      No other surgeries.       Immunizations:   Immunization History   Administered Date(s) Administered     COVID-19 Vaccine 18+ (Moderna) 01/27/2021, 02/24/2021     Flu, Unspecified 09/01/2014     Influenza (High Dose) 3 valent vaccine 09/14/2015, 11/08/2016, 10/16/2017, 09/25/2018, 10/03/2019     Pneumo Conj 13-V (2010&after) 12/05/2014     Pneumococcal 23 valent 03/10/2017       ROS A comprehensive review of systems was performed and was otherwise negative    Medications, allergies, and problem list were reviewed and updated    Exam  /76   Pulse 78   Wt 49 kg (108 lb)   SpO2 (!) 85%   BMI 18.68 kg/m    Her lungs with decreased breath sounds throughout but no wheezing or crackles.  Heart is regular without murmur.  Trace ankle edema bilaterally, improved    Assessment/Plan  1. Chronic obstructive pulmonary disease, unspecified COPD type (H)  Severe but stable since her URI exacerbation last November.  Now requiring continuous oxygen 2 L nasal cannula.  Has benefited clinically from oxygen  supplementation.    Needs a new portable concentrator unit as the current tanks are not manageable for her.  We did contact the oxygen company and they stated they would contact her tonight to arrange for portable unit that will work for her.  - Home Oxygen Order for DME - ONLY FOR DME    She complains of tongue swelling with albuterol inhaler.  She had a similar complaint with an Incruse Ellipta inhaler in the past.  I suspect this is not an allergy but rather a reaction to the sprays on her dry mouth tongue.  I encouraged her to try a different albuterol inhaler different brand to see if that caused any problems with her tongue.  Encouraged her to avoid spraying the medicine on her tongue but rather inhale it into her lungs when she gives herself a spray of it.    Continue on the Symbicort inhaler at this time.    Not on Spiriva because of her previous complaint of the tongue swelling on the Incruse Ellipta inhaler.    2. Chronic respiratory failure with hypoxia (H)  As above.  Oxygen 2 L nasal cannula continuous, get new portable unit    3. Essential hypertension  Controlled.  Controlled on lower dose amlodipine 5 mg.  Continue on the lower dose, which likely has helped her leg swelling.  Continue lisinopril 40 mg a day.  Rechecking kidney labs.    History of low sodium.  Not on sodium chloride supplement.    4. Fatigue, unspecified type  Consistent with her chronic COPD  - TSH    5. Encounter for therapeutic drug monitoring    - Comprehensive metabolic panel  - CBC with platelets    6. Bilateral lower extremity edema  Associated with her COPD and inactivity and respiratory infection last November.  I suspect she may have some element of pulmonary hypertension when she is having hypoxia, but that was not seen on echo when on oxygen    Lower extreme edema improved.  Continue on lower dose amlodipine    7. Pulmonary emphysema, unspecified emphysema type (H)  As above  - albuterol (PROAIR HFA/PROVENTIL HFA/VENTOLIN  HFA) 108 (90 Base) MCG/ACT inhaler; Inhale 2 puffs into the lungs every 6 hours as needed for shortness of breath or wheezing  Dispense: 18 g; Refill: 4    History of chronic anxiety.  Try to avoid lorazepam if able given her chronic hypoxia.  Continue Prozac daily.    Severe macular degeneration, stable and managed by her ophthalmologist.    She did confirm that she had the influenza vaccine this past fall and the new Bivalent COVID-vaccine last fall at her [facility], although we do not have a record here.      Return in 3 months (on 4/25/2023) for Routine preventive.   Patient Instructions   Continue on the lower dose of amlodipine 5 mg a day.  If your blood pressure does get above 140/85, contact me.    Use your oxygen 2 L nasal cannula all the time.  Get a portable oxygen system that works for you.    Continue your daily walking and activity.    Try a different brand of albuterol, and rinse your mouth out right after using it.  Be careful not to spray the albuterol on your tongue when you inhale it.    Follow-up on April 25 for your physical.    Usman Gaines MD, MD        Current Outpatient Medications   Medication Sig Dispense Refill     albuterol (PROAIR HFA/PROVENTIL HFA/VENTOLIN HFA) 108 (90 Base) MCG/ACT inhaler Inhale 2 puffs into the lungs every 6 hours as needed for shortness of breath or wheezing 18 g 4     amLODIPine (NORVASC) 5 MG tablet Take 1 tablet (5 mg) by mouth daily 90 tablet 3     budesonide-formoterol (SYMBICORT) 160-4.5 MCG/ACT Inhaler Inhale 2 puffs into the lungs 2 times daily 10.2 g 11     calcium carbonate (OS-NATALEE) 600 mg (1,500 mg) tablet [CALCIUM CARBONATE (OS-NATALEE) 600 MG (1,500 MG) TABLET] Take 600 mg by mouth daily.        cholecalciferol, vitamin D3, (VITAMIN D3) 2,000 unit Tab [CHOLECALCIFEROL, VITAMIN D3, (VITAMIN D3) 2,000 UNIT TAB] Once a day 100 each 3     Cranberry-Vitamin C-Vitamin E 140-100-3 MG-MG-UNIT CAPS Take 2 tablets by mouth daily       FLUoxetine (PROZAC) 20  MG capsule [FLUOXETINE (PROZAC) 20 MG CAPSULE] TAKE 1 CAPSULE BY MOUTH ONE TIME DAILY 90 capsule 3     lisinopril (ZESTRIL) 40 MG tablet Take 1 tablet (40 mg) by mouth daily 90 tablet 3     LORazepam (ATIVAN) 0.5 MG tablet TAKE 1 TABLET BY MOUTH 2 TIMES DAILY AS NEEDED FOR ANXIETY 60 tablet 0     ondansetron (ZOFRAN ODT) 4 MG disintegrating tablet [ONDANSETRON (ZOFRAN ODT) 4 MG DISINTEGRATING TABLET] Take 1 tablet (4 mg total) by mouth every 8 (eight) hours as needed for nausea. 30 tablet 1     vitamin A-vitamin C-vit E-min (OCUVITE) Tab tablet Take 1 tablet by mouth daily       Allergies   Allergen Reactions     Sulfa (Sulfonamide Antibiotics) [Sulfa Drugs] Diarrhea and Nausea     Also rash, throat swelling     Incruse Ellipta [Umeclidinium] Angioedema     Mouth and tongue swelling     Social History     Tobacco Use     Smoking status: Former     Types: Cigarettes     Quit date: 2010     Years since quittin.1     Smokeless tobacco: Never   Vaping Use     Vaping Use: Never used   Substance Use Topics     Alcohol use: No     Drug use: No             Subjective   Suzanna is a 82 year old, presenting for the following health issues:  Follow Up (3 wk follow up, wants to discuss portable oxygen.)      HPI           Review of Systems         Objective    There were no vitals taken for this visit.  There is no height or weight on file to calculate BMI.  Physical Exam

## 2023-02-09 ENCOUNTER — TELEPHONE (OUTPATIENT)
Dept: INTERNAL MEDICINE | Facility: CLINIC | Age: 83
End: 2023-02-09
Payer: MEDICARE

## 2023-02-09 NOTE — TELEPHONE ENCOUNTER
Outgoing Call:    Pt given the following information per OV encounter dated 2.6.23  Needs a new portable concentrator unit as the current tanks are not manageable for her.  We did contact the oxygen company and they stated they would contact her tonight to arrange for portable unit that will work for her.  - Home Oxygen Order for DME - ONLY FOR DME    Naomi VALENTIN RN  [facility]

## 2023-02-09 NOTE — TELEPHONE ENCOUNTER
Order/Referral Request    Who is requesting: Dr. Gaines and patient called to inquire into the status of this order.  Patient said she would like a call back.    Orders being requested: see order - 2/6/23.   Patient needs lightweight portable oxygen unit.      Reason service is needed/diagnosis:  COPD    When are orders needed by: as soon as possible    Has this been discussed with Provider: Yes    Does patient have a preference on a Group/Provider/Facility? No,  Patient said her current home oxygen company may not have the lightweight portable tank.  Please call patient to confirm     Does patient have an appointment scheduled?: No    Where to send orders: TBD    Could we send this information to you in [employer] or would you prefer to receive a phone call?:   Patient would prefer a phone call   Okay to leave a detailed message?: No Home number on file [phone] (home)  Patient would like to speak with someone.

## 2023-02-22 ENCOUNTER — DOCUMENTATION ONLY (OUTPATIENT)
Dept: HOME HEALTH SERVICES | Facility: CLINIC | Age: 83
End: 2023-02-22
Payer: MEDICARE

## 2023-02-22 NOTE — PROGRESS NOTES
Called and spoke with pt about a portable oxygen concentrator she can use with her walker. Scheduled a pulse dose walk test with pt for 2/27/23 at 1:30pm.     Tammi Haddad, RRT  [facility]  [phone]

## 2023-02-27 ENCOUNTER — TRANSFERRED RECORDS (OUTPATIENT)
Dept: HEALTH INFORMATION MANAGEMENT | Facility: CLINIC | Age: 83
End: 2023-02-27

## 2023-02-27 ENCOUNTER — DOCUMENTATION ONLY (OUTPATIENT)
Dept: HOME HEALTH SERVICES | Facility: CLINIC | Age: 83
End: 2023-02-27
Payer: MEDICARE

## 2023-02-28 NOTE — PROGRESS NOTES
Date of oxygen pulse dose test: 23 @1:30pm    Patient with the diagnosis of COPD qualified for home oxygen with  oxygen saturation of 86% and uses 2LPM O2 continuous since 23. I arrived at patients apartment and she was on room air. She was accompanied by her daughter-in-law. Patient would like to be evaluated on a pulse dose unit because due to her blindness, she has difficulty refilling tanks.    Results/Recommendations: 3-4 pulse dose     RESULTS:  Vitals upon arrival - room air SpO2 88%, HR 74  **Each patient determines the amount of exercise during the test**  3.5 PULSE RESTIN % SPO2, 69 HR    3.5 O2 PULSE ACTIVITY: 95% SPO2, 77 HR    3.5 O2 PULSE ACTIVITY: 91 % SPO2, 81 HR  *Patients SpO2 remained above 90% during walk test on pulse dose      Tammi Haddad, DANIELLE  [facility]  [phone]

## 2023-03-01 ENCOUNTER — MEDICAL CORRESPONDENCE (OUTPATIENT)
Dept: HEALTH INFORMATION MANAGEMENT | Facility: CLINIC | Age: 83
End: 2023-03-01

## 2023-04-13 NOTE — NURSING NOTE
Is the patient currently in the state of [place]? YES    Visit mode:VIDEO    If the visit is dropped, the patient can be reconnected by: VIDEO VISIT: Text to cell phone: [phone]    Will anyone else be joining the visit? Yes, daughter.      How would you like to obtain your AVS? MyChart    Are changes needed to the allergy or medication list? NO - no changes since last checked    Reason for visit: Annual visit.    AYAH SMITH

## 2023-04-13 NOTE — PATIENT INSTRUCTIONS
It was a pleasure to see you over video today.   Here is what we discussed:    Continue Spiriva one puff twice daily, rinse/gargle after use.  Continue Albuterol every six hours as needed for shortness of breath or wheezing.  Continue oxygen 2-4L to keep oxygen saturations >88%.  Contact [facility] medical to check on your wait status for the portable pulse dose device.  Call us with any change or worsening of your breathing.  Follow-up in one year, sooner if you have issues or frequent exacerbations.    Saba Vergara, CNP  Pulmonary Medicine  [facility]  [phone]

## 2023-04-13 NOTE — PROGRESS NOTES
"Virtual Visit Details    Type of service:  Video Visit   Video Start Time: 10:14am  Video End Time:10:44am    Originating Location (pt. Location): Home  Distant Location (provider location):  On-site   Platform used for Video Visit: [facility]        Pulmonary [facility] Follow-Up        Assessment/Plan:     Celeste Peguero (\"Suzanna\") is an 82 y.o. female with pmhx COPD, emphysema, HTN, participating in video visit today for annual follow up.     COPD - GOLD C  Emphysema  Chronic hypoxic respiratory failure  Very severe obstructive lung disease (FEV1 32%), with air-trapping, hyperinflation, and severe diffusion capacity defect.  Improvement in spirometry after bronchodilator.  Moderate to severe centrilobular emphysema.  She has an allergy to umeclidinium.  She has had a couple exacerbations over the past year, which were treated by her PCP.  She was also initiated on oxygen as she presented to PCP clinic with saturations 85% on RA.  She is using 2L continuous overnight and after activity, qualified for 3-4L portable pulse dose and is currently on a wait list for this device.  She feels her breathing has improved now that she has oxygen.  Plan:  - continue Symbicort (budesonide/formoterol) 160/4.5, one puff BID, rinse/gargle.  - continue Albuterol PRN  - continue oxygen 2-4L to keep oxygen saturations >88%  - she is UTD with COVID vaccines.  She could receive prevnar 20, discuss with PCP.  Continue with annual influenza vaccines.  - Action plan: prednisone 40mg x5 days, + augmentin 875/125 BID x5 days (if increased SOB + sputum)     Follow-up  - annually    Saba Vergara CNP  Pulmonary Medicine  [facility]  [phone]       CC:     COPD follow up     HPI:     Celeste Peguero (\"Suzanna\") is an 82 y.o. female with pmhx COPD, emphysema, HTN, participating in video visit today for annual follow up.    Over last year, has had a couple of exacerbations, these were managed by Dr" Eder her PCP.  Received prednisone and antibiotic.  Now on oxygen 2L after PCP visit 2/2023, sats were 85% on RA.  Oxygen saturations at home 88% at rest on RA, increases to 90% on 2L.  Mostly uess oxygen at rest after activity, because she cannot move around the concentrator.  The portable tanks they brought her are difficult to use and refill as she is legally blind.  She did have RT eval for pulse dose device, she felt great on 3.5L oxygen.  She is on a 3 month wait list for this device.  Does have ongoing SOB, recovers faster on oxygen.  Uses Albuterol twice/day on average.  Swollen mouth from umeclidinium in past.  Continues on Spiriva and rinses mouth afterwards.      Patient supplied answers from flow sheet for:  COPD Assessment Test (CAT)  2009 [employer]. All rights reserved.      9/17/2021     1:00 PM 4/13/2022    11:00 AM 4/13/2023    10:05 AM   COPD assessment test (CAT)   Cough 1 1 2   Phlegm 1 1 2   Chest tightness 0 1 1   Walk up hill 2 5 4   Limited activities 1 1 3   Leaving my home 0 0 3   Sleep 0 1 2   Energy 1 3 3   Total Score 6 13 20      CAT Key:  The CAT consist of 8 items which are each scored 0-5. The total score ranges from 0-40 with higher scores representing a poorer health status. When interpreting CAT scores, the individual s disease severity should be considered.   Low impact  (1-9)  Medium impact  (10-20)  High impact  (21-30)  Very high impact  (31-40)         ROS:     A 6-system review was obtained and was negative with the exception of the symptoms endorsed in the history of present illness.     Medical history:       PMH:  Past Medical History:   Diagnosis Date     Acute H. pylori gastric ulcer 2010    Treated with no further Sx     Anxiety and depression     Stabilized on prozac 20mg qday.  Uses prn lorazepam.     Hiatal hernia      HTN (hypertension)      Hyponatremia     severe/ hospitalized when Tx for H. pylori/ ulcer.  No longer on HCTZ.     Macular degeneration     Wet with  submacular hem R eye.  Avastin shots given x4  - .     Osteoporosis      DEXA S-1.9/F-3.5.  alendronate for two years, then teeth fell out.  Prolia x2 infections,then Reclast started .  No fracture Hx.       Shingles     Thorasic, no post herpetic neuralgia.       PSH:  Past Surgical History:   Procedure Laterality Date     TONSILLECTOMY, ADENOIDECTOMY, MYRINGOTOMY, INSERT TUBE BILATERAL, COMBINED      No other surgeries.         Allergies:  Allergies   Allergen Reactions     Sulfa (Sulfonamide Antibiotics) [Sulfa Drugs] Diarrhea and Nausea     Also rash, throat swelling     Incruse Ellipta [Umeclidinium] Angioedema     Mouth and tongue swelling       Family HX:  Family History   Problem Relation Age of Onset     Cerebrovascular Disease Maternal Grandmother        Social Hx:  Social History     Socioeconomic History     Marital status:      Spouse name: Not on file     Number of children: Not on file     Years of education: Not on file     Highest education level: Not on file   Occupational History     Not on file   Tobacco Use     Smoking status: Former     Types: Cigarettes     Quit date: 2010     Years since quittin.3     Smokeless tobacco: Never   Vaping Use     Vaping status: Never Used   Substance and Sexual Activity     Alcohol use: No     Drug use: No     Sexual activity: Never   Other Topics Concern     Not on file   Social History Narrative    Lives [facility] [place] [address] [facility] [place].  Has Spaniel  dog.   with COPD.     Social Determinants of Health     Financial Resource Strain: Not on file   Food Insecurity: Not on file   Transportation Needs: Not on file   Physical Activity: Not on file   Stress: Not on file   Social Connections: Not on file   Intimate Partner Violence: Not on file   Housing Stability: Not on file       Current Meds:  Current Outpatient Medications   Medication Sig Dispense Refill     albuterol (PROAIR HFA/PROVENTIL HFA/VENTOLIN HFA)  108 (90 Base) MCG/ACT inhaler Inhale 2 puffs into the lungs every 6 hours as needed for shortness of breath or wheezing 18 g 4     amLODIPine (NORVASC) 5 MG tablet Take 1 tablet (5 mg) by mouth daily 90 tablet 3     budesonide-formoterol (SYMBICORT) 160-4.5 MCG/ACT Inhaler Inhale 2 puffs into the lungs 2 times daily 10.2 g 11     calcium carbonate (OS-NATALEE) 600 mg (1,500 mg) tablet [CALCIUM CARBONATE (OS-NATALEE) 600 MG (1,500 MG) TABLET] Take 600 mg by mouth daily.        cholecalciferol, vitamin D3, (VITAMIN D3) 2,000 unit Tab [CHOLECALCIFEROL, VITAMIN D3, (VITAMIN D3) 2,000 UNIT TAB] Once a day 100 each 3     Cranberry-Vitamin C-Vitamin E 140-100-3 MG-MG-UNIT CAPS Take 2 tablets by mouth daily       FLUoxetine (PROZAC) 20 MG capsule [FLUOXETINE (PROZAC) 20 MG CAPSULE] TAKE 1 CAPSULE BY MOUTH ONE TIME DAILY 90 capsule 3     lisinopril (ZESTRIL) 40 MG tablet Take 1 tablet (40 mg) by mouth daily 90 tablet 3     LORazepam (ATIVAN) 0.5 MG tablet TAKE 1 TABLET BY MOUTH 2 TIMES DAILY AS NEEDED FOR ANXIETY 60 tablet 0     ondansetron (ZOFRAN ODT) 4 MG disintegrating tablet [ONDANSETRON (ZOFRAN ODT) 4 MG DISINTEGRATING TABLET] Take 1 tablet (4 mg total) by mouth every 8 (eight) hours as needed for nausea. 30 tablet 1     vitamin A-vitamin C-vit E-min (OCUVITE) Tab tablet Take 1 tablet by mouth daily          Physical Exam:     There were no vitals taken for this visit.  Gen: elderly female, appears in NAD  Resp: respirations even and unlabored.  No cough during exam.  On RA.  Neuro: alert, answering questions appropriately     Data:       Imaging studies:    LDCT from 2017  CONCLUSION:  1.  Stable benign 2 mm left lower lobe nodule with no additional suspicious nodules.  2.  Moderate to severe centrilobular emphysema.  3.  Mild right middle lobe bronchiectasis with associated mild fibroatelectasis redemonstrated.      Pulmonary Function Testing  PFTs 2017  FEV1/FVC is 44 and is reduced.  FEV1 is 32% predicted and is  reduced.  FVC is 57% predicted and reduced.  There was improvement in spirometry after a single inhaled dose of bronchodilator.  Flow volume loop shows scooping indicating obstruction  TLC is 235% predicted and is increased.  RV is 450% predicted and is increased.  DLCO is 22% predicted and is reduced when it   is corrected for hemoglobin.

## 2023-04-25 NOTE — PROGRESS NOTES
SUBJECTIVE:   Suzanna is a 82 year old who presents for Preventive Visit.    Patient is here for physical exam and follow-up of multiple medical issues.    She has severe COPD, previous smoker.  Mild bronchiectasis seen on 2017 CT scan but she is not having significant sputum production currently.    Moderate dyspnea on exertion but she is wearing her oxygen more often but not always.    She is still walking in the hallways on most days but has not been going on designated walks.  She was out walking twice yesterday.  Moderate dyspnea on exertion.  No chest pain or chest pressure.    Her lower extremity edema has resolved since being on oxygen and being more active.    Using her Symbicort.  She has used albuterol occasionally and does not cause mild swelling for her.    She was treated for respiratory exacerbation November of last year, likely viral URI at that time.    November 2022 chest x-ray without new mass.    January 2023 echo essentially normal with ejection fraction 60-65%, normal RV systolic function and right atrial pressure, no valve problem.    She did have some mild coronary calcification on previous CT scan in 2017 but has not had chest pain or an event.  In the past has had a high HDL, also high LDL but has not been on statin with her age and significant global weakness.    February 2023 normal TSH.    Severe macular degeneration nearly blind but no longer getting injections.  Stable.  She has not seen ophthalmology recently.    Her blood pressure has been well controlled and she denies orthostasis.  She is on a lower dose of amlodipine 5 mg since last fall.  Still on lisinopril 40 mg.  She is not taking sodium chloride pills.  History of low sodium but she has been eating well recently.    She denies eating too many sweets or carbs but did have a mildly high blood sugar of 125 in February.    She states her anxiety is under good control, she continues on Prozac 20 mg long-term.  She has not had any  "recent lorazepam, was given a prescription last August.  She is trying to avoid that.    No falls.    No abdominal pain and bowels are regular.    She states she has known about the mole on her back for some time and does not believe it is changed.  She does not want to see dermatology, no history of skin cancer.    Her cats are doing well. juanito and ari      4/25/2023     8:51 AM   Additional Questions   Roomed by Kendy COOK   Accompanied by richie         4/25/2023     8:51 AM   Patient Reported Additional Medications   Patient reports taking the following new medications no     Patient has been advised of split billing requirements and indicates understanding: Yes  Are you in the first 12 months of your Medicare coverage?  No    Healthy Habits:     In general, how would you rate your overall health?  Good    Frequency of exercise:  6-7 days/week    Duration of exercise:  30-45 minutes    Do you usually eat at least 4 servings of fruit and vegetables a day, include whole grains    & fiber and avoid regularly eating high fat or \"junk\" foods?  No    Taking medications regularly:  Yes    Medication side effects:  None    Ability to successfully perform activities of daily living:  Telephone requires assistance, transportation requires assistance, shopping requires assistance, preparing meals requires assistance, housework requires assistance, laundry requires assistance and money management requires assistance    Home Safety:  No safety concerns identified    Hearing Impairment:  Difficulty following a conversation in a noisy restaurant or crowded room, feel that people are mumbling or not speaking clearly, difficulty following dialogue in the theater, difficult to understand a speaker at a public meeting or [employer] service, need to ask people to speak up or repeat themselves, find that men's voices are easier to understand than woman's and difficulty understanding soft or whispered speech    In the past 6 months, have " you been bothered by leaking of urine?  No    In general, how would you rate your overall mental or emotional health?  Good      PHQ-2 Total Score: 0    Additional concerns today:  No      Have you ever done Advance Care Planning? (For example, a Health Directive, POLST, or a discussion with a medical provider or your loved ones about your wishes): Yes, advance care planning is on file.       Fall risk  Fallen 2 or more times in the past year?: No  Any fall with injury in the past year?: Yes  click delete button to remove this line now  Cognitive Screening   1) Repeat 3 items (Leader, Season, Table)    2) Clock draw: NORMAL  3) 3 item recall: Recalls 3 objects  Results: NORMAL clock, 1-2 items recalled: COGNITIVE IMPAIRMENT LESS LIKELY    Mini-CogTM Copyright GEMINI Queen. Licensed by the author for use in [facility]; reprinted with permission (keith@[facility]). All rights reserved.      Do you have sleep apnea, excessive snoring or daytime drowsiness?: no    Reviewed and updated as needed this visit by clinical staff   Tobacco  Allergies  Meds              Reviewed and updated as needed this visit by Provider                 Social History     Tobacco Use     Smoking status: Former     Types: Cigarettes     Quit date: 2010     Years since quittin.3     Smokeless tobacco: Never   Vaping Use     Vaping status: Never Used   Substance Use Topics     Alcohol use: No             2023     8:44 AM   Alcohol Use   Prescreen: >3 drinks/day or >7 drinks/week? No          View : No data to display.              Do you have a current opioid prescription? No  Do you use any other controlled substances or medications that are not prescribed by a provider? None        Current providers sharing in care for this patient include:   Patient Care Team:  Usman Gaines MD as PCP - General (Internal Medicine)  Usman Gaines MD as Assigned PCP  Ayden Duenas MD as Assigned Pulmonology Provider    The following  health maintenance items are reviewed in Epic and correct as of today:  Health Maintenance   Topic Date Due     DEXA  Never done     ANNUAL REVIEW OF HM ORDERS  Never done     COPD ACTION PLAN  Never done     DTAP/TDAP/TD IMMUNIZATION (1 - Tdap) Never done     ZOSTER IMMUNIZATION (1 of 2) Never done     COVID-19 Vaccine (3 - Booster for Moderna series) 04/21/2021     MEDICARE ANNUAL WELLNESS VISIT  06/23/2022     INFLUENZA VACCINE (1) 09/01/2022     FALL RISK ASSESSMENT  04/25/2024     ADVANCE CARE PLANNING  07/10/2026     SPIROMETRY  Completed     PHQ-2 (once per calendar year)  Completed     Pneumococcal Vaccine: 65+ Years  Completed     IPV IMMUNIZATION  Aged Out     MENINGITIS IMMUNIZATION  Aged Out     Lab work is in process  Current Outpatient Medications   Medication Sig Dispense Refill     albuterol (PROAIR HFA/PROVENTIL HFA/VENTOLIN HFA) 108 (90 Base) MCG/ACT inhaler Inhale 2 puffs into the lungs every 6 hours as needed for shortness of breath or wheezing 18 g 4     amLODIPine (NORVASC) 5 MG tablet Take 1 tablet (5 mg) by mouth daily 90 tablet 3     budesonide-formoterol (SYMBICORT) 160-4.5 MCG/ACT Inhaler Inhale 2 puffs into the lungs 2 times daily 10.2 g 11     calcium carbonate (OS-NATALEE) 600 mg (1,500 mg) tablet [CALCIUM CARBONATE (OS-NATALEE) 600 MG (1,500 MG) TABLET] Take 600 mg by mouth daily.        cholecalciferol, vitamin D3, (VITAMIN D3) 2,000 unit Tab [CHOLECALCIFEROL, VITAMIN D3, (VITAMIN D3) 2,000 UNIT TAB] Once a day 100 each 3     Cranberry-Vitamin C-Vitamin E 140-100-3 MG-MG-UNIT CAPS Take 2 tablets by mouth daily       FLUoxetine (PROZAC) 20 MG capsule [FLUOXETINE (PROZAC) 20 MG CAPSULE] TAKE 1 CAPSULE BY MOUTH ONE TIME DAILY 90 capsule 3     lisinopril (ZESTRIL) 40 MG tablet Take 1 tablet (40 mg) by mouth daily 90 tablet 3     LORazepam (ATIVAN) 0.5 MG tablet TAKE 1 TABLET BY MOUTH 2 TIMES DAILY AS NEEDED FOR ANXIETY 60 tablet 0     ondansetron (ZOFRAN ODT) 4 MG disintegrating tablet  "[ONDANSETRON (ZOFRAN ODT) 4 MG DISINTEGRATING TABLET] Take 1 tablet (4 mg total) by mouth every 8 (eight) hours as needed for nausea. 30 tablet 1     vitamin A-vitamin C-vit E-min (OCUVITE) Tab tablet Take 1 tablet by mouth daily       Allergies   Allergen Reactions     Sulfa (Sulfonamide Antibiotics) [Sulfa Drugs] Diarrhea and Nausea     Also rash, throat swelling     Incruse Ellipta [Umeclidinium] Angioedema     Mouth and tongue swelling       Pertinent mammograms are reviewed under the imaging tab.    Review of Systems   Constitutional: Negative for chills and fever.   HENT: Positive for congestion. Negative for ear pain, hearing loss and sore throat.    Eyes: Negative for pain and visual disturbance.   Respiratory: Positive for shortness of breath. Negative for cough.    Cardiovascular: Positive for peripheral edema. Negative for chest pain and palpitations.   Gastrointestinal: Negative for abdominal pain, constipation, diarrhea, heartburn, hematochezia and nausea.   Breasts:  Negative for tenderness, breast mass and discharge.   Genitourinary: Positive for frequency and urgency. Negative for dysuria, genital sores, hematuria, pelvic pain, vaginal bleeding and vaginal discharge.   Musculoskeletal: Negative for arthralgias, joint swelling and myalgias.   Skin: Negative for rash.   Neurological: Positive for weakness. Negative for dizziness, headaches and paresthesias.   Psychiatric/Behavioral: Negative for mood changes. The patient is not nervous/anxious.    Above chronic problems, and edema has resolved.    OBJECTIVE:   /68   Pulse 73   Temp 97.8  F (36.6  C)   Resp 16   Ht 1.613 m (5' 3.5\")   Wt 47.2 kg (104 lb 2 oz)   SpO2 91%   BMI 18.16 kg/m   Estimated body mass index is 18.16 kg/m  as calculated from the following:    Height as of this encounter: 1.613 m (5' 3.5\").    Weight as of this encounter: 47.2 kg (104 lb 2 oz).  Physical Exam  Frail elderly female.  Moderate kyphosis.  Alert and " oriented x3 with good mood and affect.  Nearly blind unable to do clock face drawing.  But 3 out of 3 word recall.  Normal verbal interaction.  Global muscle atrophy but stable.  Able to stand up and climb on the exam table without assistance.  No parkinsonian signs.  Pupils irises are minimally reactive and no jaundice or conjunctivitis.  No pharyngitis, has dentures and no oral lesions noted.  External ears and nose exam was normal with moderate cerumen but not obstructing and normal tympanic membranes.  No cervical or supraclavicular or axillary adenopathy.  No JVD and no carotid bruits.  No thyromegaly or nodularity.  Lungs with decreased breath sounds throughout but no wheezing or crackles or rhonchi.  No dullness.  Heart is regular without murmur rub or gallop.  +1 pedal pulses and no ankle edema.  Feet in good condition.  Abdomen is soft nontender nonobese no hepatosplenomegaly and no pulsatile abdominal mass.  Skin exam does show a almost 1 cm well-circumscribed dark pigmented mole just to the left of center in her thoracic area of her back.  No other suspicious skin lesions noted.    ASSESSMENT / PLAN:   (Z00.00) Encounter for wellness examination in adult  (primary encounter diagnosis)  Comment: Main issue is her severe COPD.  Her other main issue is deconditioning with osteoporosis.    She has some moderate cardiovascular risk factors but has not had an event with age and her other issues are not planning a statin.  Plan: Emphasized the importance of daily walking and exercise.    She is DNR/DNI    She was reminded to make a routine eye exam this year.    (I10) Essential hypertension  Comment: Controlled.  Continue current lisinopril with amlodipine and nonfasting and not planning statin but will check levels.  Plan: Lipid Profile            (J43.9) Pulmonary emphysema, unspecified emphysema type (H)  Comment: Severe but stable.  DNR/DNI.  Chronic oxygen 2 L and she was encouraged to use that more often,  to improve her endurance and allow her to be more active.    Symbicort daily.  Albuterol as needed  Plan:     (H35.30) Macular degeneration, unspecified laterality, unspecified type  Comment: Severe but stable.  Plan: Still see ophthalmology for routine yearly exam    (E55.9) Vitamin D deficiency  Comment: Continue current supplement  Plan: Vitamin D Deficiency            (R73.9) Hyperglycemia  Comment: Avoid sugary foods and sweets.  Increase regular walking.   Plan: Hemoglobin A1c            May be prediabetes    (M81.0) Osteoporosis without current pathological fracture, unspecified osteoporosis type  Comment: History of Prolia and bisphosphonate treatment.  No fracture.  I encouraged daily walking.  She declined DEXA and consideration for Prolia  Plan: Continue calcium vitamin D supplement    (Z51.81) Medication monitoring encounter  Comment:   Plan: CBC with platelets, Comprehensive metabolic         panel            (L98.9) Skin lesion  Comment: Pigmented mole on back.  It is a dark mole but will demarcated and uniform color.  She was offered referral to dermatology and told that it could be at risk for developing melanoma.  She was told to have a family member monitor for change.  Plan: Have her see dermatology if any changes of the mole    (Z23) Need for vaccination against Streptococcus pneumoniae  Comment: Given today  Plan: PNEUMOCOCCAL 20 VALENT CONJUGATE (PREVNAR 20)              Patient has been advised of split billing requirements and indicates understanding: Yes      COUNSELING:  Reviewed preventive health counseling, as reflected in patient instructions       Regular exercise       Healthy diet/nutrition       Vision screening        She reports that she quit smoking about 12 years ago. Her smoking use included cigarettes. She has never used smokeless tobacco.      Appropriate preventive services were discussed with this patient, including applicable screening as appropriate for cardiovascular  disease, diabetes, osteopenia/osteoporosis, and glaucoma.  As appropriate for age/gender, discussed screening for colorectal cancer, prostate cancer, breast cancer, and cervical cancer. Checklist reviewing preventive services available has been given to the patient.    Reviewed patients plan of care and provided an AVS. The Basic Care Plan (routine screening as documented in Health Maintenance) for Celeste meets the Care Plan requirement. This Care Plan has been established and reviewed with the Patient.          Usman Gaines MD  [facility]    Identified Health Risks:    I have reviewed Opioid Use Disorder and Substance Use Disorder risk factors and made any needed referrals.

## 2023-04-25 NOTE — PATIENT INSTRUCTIONS
No change in medication treatment plan at this time.    You need to increase daily walking.  After breakfast take a 20-minute walk with your walker.  You can sit and rest during the walk if needed.  Use your oxygen during the walk to improve your endurance.  Make this part of your daily schedule.    See your ophthalmologist sometime this year for routine eye exam.    You have a pigmented mole on your back.  Look at that monthly to see if there are any changes in the shape or color of the mole.    Follow-up in 6 months for a blood pressure checkup appointment.

## 2023-05-12 NOTE — TELEPHONE ENCOUNTER
I called patient.  She is having the beginning of a URI with cough and nasal congestion.  Some increasing shortness of breath, using oxygen more.  No fever.  Not severe shortness of breath.    She does feel she is getting a bronchitis exacerbation/COPD exacerbation.    Prednisone 20 mg a day for 5 days, Dalia.  Patient told she should be feeling better by next week otherwise contact me.  Continue oxygen.    Daughter-in-law contacted to  prescriptions for patient

## 2023-05-12 NOTE — TELEPHONE ENCOUNTER
Patient calling to report a possible COPD exacerbation.   She reports she woke up this morning feeling congested and slightly SOB.     She is on 2L O2 at night, but during the day she has started to use 3L O2 via NC.   She states she does not know what her O2 sats are.     She did have a slight temperature yesterday, she states she is legally blind so does not know what the temp was but she said she felt she had a lowgrade fever.     She denies cough.   She reports she feels SOB at rest, she is speaking well, no wheezing or severe SOB.   Per Pt, she typically feels SOB with activity but rarely will feel SOB at rest.   She slept propped up last night.     Recommended evaluation today. Pt requesting a ZPak from PCP. She is unsure if she can get a ride in today. She's requesting advisement from PCP and a call back.     Pt was recently in clinic for a AWV on 4/25/2023. Will route to PCP to review/advise and call back.     Thank you,   Kendy HERNADEZ    Reason for Disposition    Longstanding difficulty breathing (e.g., CHF, COPD, emphysema) and worse than normal    Additional Information    Negative: SEVERE difficulty breathing (e.g., struggling for each breath, speaks in single words, pulse > 120)    Negative: Breathing stopped and hasn't returned    Negative: Choking on something    Negative: Bluish (or gray) lips or face    Negative: Difficult to awaken or acting confused (e.g., disoriented, slurred speech)    Negative: Passed out (i.e., fainted, collapsed and was not responding)    Negative: Wheezing started suddenly after medicine, an allergic food, or bee sting    Negative: Stridor    Negative: Slow, shallow and weak breathing    Negative: Sounds like a life-threatening emergency to the triager    Negative: Chest pain    Negative: Wheezing (high pitched whistling sound) and previous asthma attacks or use of asthma medicines    Negative: Difficulty breathing and within 14 days of COVID-19 Exposure    Negative: Difficulty  "breathing and only present when coughing    Negative: Difficulty breathing and only from stuffy nose    Negative: Difficulty breathing and only from stuffy nose or runny nose from common cold    Negative: MODERATE difficulty breathing (e.g., speaks in phrases, SOB even at rest, pulse 100-120) of new-onset or worse than normal    Negative: Oxygen level (e.g., pulse oximetry) 90 percent or lower    Negative: Wheezing can be heard across the room    Negative: Drooling or spitting out saliva (because can't swallow)    Negative: Any history of prior \"blood clot\" in leg or lungs    Negative: Illness requiring prolonged bedrest in past month (e.g., immobilization, long hospital stay)    Negative: Hip or leg fracture (broken bone) in past month (or had cast on leg or ankle in past month)    Negative: Major surgery in the past month    Negative: Long-distance travel in past month (e.g., car, bus, train, plane; with trip lasting 6 or more hours)    Negative: Cancer treatment in past six months (or has cancer now)    Negative: Extra heart beats OR irregular heart beating (i.e., \"palpitations\")    Negative: Fever > 103 F (39.4 C)    Negative: Fever > 101 F (38.3 C) and over 60 years of age    Negative: Fever > 100.0 F (37.8 C) and bedridden (e.g., nursing home patient, stroke, chronic illness, recovering from surgery)    Negative: Fever > 100.0 F (37.8 C) and diabetes mellitus or weak immune system (e.g., HIV positive, cancer chemo, splenectomy, organ transplant, chronic steroids)    Negative: Periods where breathing stops and then resumes normally and bedridden (e.g., nursing home patient, CVA)    Negative: Pregnant or postpartum (from 0 to 6 weeks after delivery)    Negative: Patient sounds very sick or weak to the triager    Answer Assessment - Initial Assessment Questions  1. RESPIRATORY STATUS: \"Describe your breathing?\" (e.g., wheezing, shortness of breath, unable to speak, severe coughing)       Pt reports some SOB.   2. " "ONSET: \"When did this breathing problem begin?\"       Last night.   3. PATTERN \"Does the difficult breathing come and go, or has it been constant since it started?\"         4. SEVERITY: \"How bad is your breathing?\" (e.g., mild, moderate, severe)     - MILD: No SOB at rest, mild SOB with walking, speaks normally in sentences, can lie down, no retractions, pulse < 100.     - MODERATE: SOB at rest, SOB with minimal exertion and prefers to sit, cannot lie down flat, speaks in phrases, mild retractions, audible wheezing, pulse 100-120.     - SEVERE: Very SOB at rest, speaks in single words, struggling to breathe, sitting hunched forward, retractions, pulse > 120       Moderate SOB. SOB at rest, she says she has COPD so she will feel short of breath sometimes but normally that's only with ambulation.   5. RECURRENT SYMPTOM: \"Have you had difficulty breathing before?\" If Yes, ask: \"When was the last time?\" and \"What happened that time?\"       NA  6. CARDIAC HISTORY: \"Do you have any history of heart disease?\" (e.g., heart attack, angina, bypass surgery, angioplasty)       NA  7. LUNG HISTORY: \"Do you have any history of lung disease?\"  (e.g., pulmonary embolus, asthma, emphysema)      Emphysema.   8. CAUSE: \"What do you think is causing the breathing problem?\"       COPD Exacerbation.   9. OTHER SYMPTOMS: \"Do you have any other symptoms? (e.g., dizziness, runny nose, cough, chest pain, fever)      No dizziness. No cough.   Per Pt, slight fever yesterday.   10. O2 SATURATION MONITOR:  \"Do you use an oxygen saturation monitor (pulse oximeter) at home?\" If Yes, \"What is your reading (oxygen level) today?\" \"What is your usual oxygen saturation reading?\" (e.g., 95%)        Pt is on O2, 3L for portable. 2L at night . Has not had to bump that up at all.   11. PREGNANCY: \"Is there any chance you are pregnant?\" \"When was your last menstrual period?\"        No  12. TRAVEL: \"Have you traveled out of the country in the last month?\" " (e.g., travel history, exposures)        No    Protocols used: BREATHING DIFFICULTY-A-[place]

## 2023-10-26 NOTE — PATIENT INSTRUCTIONS
No change in medication treatment plan.    You are due to see your ophthalmologist for follow-up on your macular degeneration.  Make an appointment with ophthalmology.    Consider getting an RSV vaccine at your local pharmacy.  Wait at least 2 weeks after your vaccines today.    Continue to walk on a daily basis.    The mole on your back is unchanged but continue to observe it for any change in shape or color.    Follow-up after April 25 for your adult wellness visit physical exam.

## 2023-10-26 NOTE — PROGRESS NOTES
Celeste Peguero   83 year old female    Date of Visit: 10/26/2023    Chief Complaint   Patient presents with    Follow Up     FASTING, FLU AND COVID.     Subjective  83-year-old female, resident of independent living.  Severe COPD with mild bronchiectasis.  Quit smoking in 2010.  November 2022 negative chest x-ray.  Uses oxygen but not continuous.  Walks every day and is having good exertional ability.  To stable dyspnea on exertion.  No lower extremity edema issues.    No palpitations or lightheaded dizzy spells.    She has not checked her blood pressure but it remains controlled on lisinopril 40 mg a day and amlodipine 5 mg a day.  Not using sodium chloride pills.    Her diet is good, eating regular meals and minimal nausea.  Just occasional Zofran use.    Chronic anxiety and dyspepsia but controlled on Prozac 20 mg a day.  Has used occasional lorazepam in the past but has not been requesting it this year.    January 2023 heart echo with ejection fraction 60-65% with normal RV function and no evidence of pulmonary hypertension.  No valve problems.    Mild coronary calcification in the past but no event.  High HDL, not planning statin.    Normal thyroid test in February.    No new cough or change in cough or hemoptysis.    Prediabetes and watching blood sugar.  Weight is stable.  Hemoglobin A1c level is 5.7% in April of this year.    Previous Prolia and osteoporosis but she has not wanted to repeat DEXA or consider further Prolia.    No headache complaints.    Its been over 1 year since she seen the eye doctor.  She has severe macular degeneration but was no longer getting injections.  She feels her vision has been stable.    She does not have the mole on her back looked at.    PMHx:    Past Medical History:   Diagnosis Date    Acute H. pylori gastric ulcer 2010    Treated with no further Sx    Anxiety and depression     Stabilized on prozac 20mg qday.  Uses prn lorazepam.    Hiatal hernia     HTN  "(hypertension)     Hyponatremia     severe/ hospitalized when Tx for H. pylori/ ulcer.  No longer on HCTZ.    Macular degeneration     Wet with submacular hem R eye.  Avastin shots given x4 8/11 - 1/12.    Osteoporosis     4/13 DEXA S-1.9/F-3.5.  alendronate for two years, then teeth fell out.  Prolia x2 infections,then Reclast started 12/13.  No fracture Hx.      Shingles 2009    Thorasic, no post herpetic neuralgia.     PSHx:    Past Surgical History:   Procedure Laterality Date    TONSILLECTOMY, ADENOIDECTOMY, MYRINGOTOMY, INSERT TUBE BILATERAL, COMBINED      No other surgeries.       Immunizations:   Immunization History   Administered Date(s) Administered    COVID-19 Bivalent 18+ (Moderna) 05/23/2023    COVID-19 Monovalent 18+ (Moderna) 01/27/2021, 02/24/2021    Flu, Unspecified 09/01/2014    Influenza (High Dose) 3 valent vaccine 09/14/2015, 11/08/2016, 10/16/2017, 09/25/2018, 10/03/2019    Pneumo Conj 13-V (2010&after) 12/05/2014    Pneumococcal 20 valent Conjugate (Prevnar 20) 04/25/2023    Pneumococcal 23 valent 03/10/2017       ROS A comprehensive review of systems was performed and was otherwise negative    Medications, allergies, and problem list were reviewed and updated    Exam  /66   Pulse 72   Temp 97.8  F (36.6  C)   Resp 18   Ht 1.613 m (5' 3.5\")   Wt 47.6 kg (105 lb)   LMP  (LMP Unknown)   SpO2 91%   BMI 18.31 kg/m    The mole on her back is unchanged still approximately 1 cm but uniform color and well demarcated.  In the center of her upper back.  Lungs with decreased breath sounds throughout but no wheezing or crackles.  Heart is regular without murmur.  Abdomen is nontender and there is no edema.  Alert and oriented with good mood and affect.  Able to climb up on exam table.  No parkinsonian signs.    Assessment/Plan  1. Essential hypertension  Well-controlled.  Continue current lisinopril and amlodipine.  Not needing sodium chloride medications.  History of hyponatremia.    Not " planning statin with high HDL and age.  - Lipid Profile    2. Pulmonary emphysema, unspecified emphysema type (H)  Severe but stable.  Continue current Symbicort inhaler and albuterol as needed.  Oxygen as needed.  Continue daily walking.    I did recommend the RSV vaccine but that mutated at local pharmacy  - respiratory syncytial virus vaccine, bivalent (ABRYSVO) injection; Inject 0.5 mLs into the muscle once for 1 dose  Dispense: 0.5 mL; Refill: 0    3. Nausea  Chronic dyspepsia.  - ondansetron (ZOFRAN ODT) 4 MG ODT tab; Take 1 tablet (4 mg) by mouth every 8 hours as needed for nausea  Dispense: 30 tablet; Refill: 1    4. Macular degeneration (senile) of retina  Stable but its been over 1 year since he seen the eye doctor.  I recommended ophthalmology eye exam    5. Prediabetes  Avoid sugary sweets and starchy foods.  Continue daily walking.  Not on medication.  - Hemoglobin A1c  - Lipid Profile    6. Encounter for therapeutic drug monitoring    - Basic metabolic panel    7. Anxiety  Controlled.  Prozac daily.    8. Need for immunization against influenza  Given today  - INFLUENZA VACCINE 65+ (FLUZONE HD)    9. Need for COVID-19 vaccine  Given today  - COVID-19 12+ (2023-24) (PFIZER)    10. Need for prophylactic vaccination and inoculation against respiratory syncytial virus (RSV)  Recommended for future  - respiratory syncytial virus vaccine, bivalent (ABRYSVO) injection; Inject 0.5 mLs into the muscle once for 1 dose  Dispense: 0.5 mL; Refill: 0      Return in about 6 months (around 4/26/2024) for Adult wellness visit physical exam.   Patient Instructions   No change in medication treatment plan.    You are due to see your ophthalmologist for follow-up on your macular degeneration.  Make an appointment with ophthalmology.    Consider getting an RSV vaccine at your local pharmacy.  Wait at least 2 weeks after your vaccines today.    Continue to walk on a daily basis.    The mole on your back is unchanged but  continue to observe it for any change in shape or color.    Follow-up after April 25 for your adult wellness visit physical exam.    Usman Gaines MD, MD        Current Outpatient Medications   Medication Sig Dispense Refill    albuterol (PROAIR HFA/PROVENTIL HFA/VENTOLIN HFA) 108 (90 Base) MCG/ACT inhaler Inhale 2 puffs into the lungs every 6 hours as needed for shortness of breath or wheezing 18 g 4    amLODIPine (NORVASC) 5 MG tablet Take 1 tablet (5 mg) by mouth daily 90 tablet 3    budesonide-formoterol (SYMBICORT) 160-4.5 MCG/ACT Inhaler Inhale 2 puffs into the lungs 2 times daily 10.2 g 11    calcium carbonate (OS-NATALEE) 600 mg (1,500 mg) tablet [CALCIUM CARBONATE (OS-NATALEE) 600 MG (1,500 MG) TABLET] Take 600 mg by mouth daily.       cholecalciferol, vitamin D3, (VITAMIN D3) 2,000 unit Tab [CHOLECALCIFEROL, VITAMIN D3, (VITAMIN D3) 2,000 UNIT TAB] Once a day 100 each 3    Cranberry-Vitamin C-Vitamin E 140-100-3 MG-MG-UNIT CAPS Take 2 tablets by mouth daily      FLUoxetine (PROZAC) 20 MG capsule [FLUOXETINE (PROZAC) 20 MG CAPSULE] TAKE 1 CAPSULE BY MOUTH ONE TIME DAILY 90 capsule 3    lisinopril (ZESTRIL) 40 MG tablet Take 1 tablet (40 mg) by mouth daily 90 tablet 3    LORazepam (ATIVAN) 0.5 MG tablet TAKE 1 TABLET BY MOUTH 2 TIMES DAILY AS NEEDED FOR ANXIETY 60 tablet 0    ondansetron (ZOFRAN ODT) 4 MG ODT tab Take 1 tablet (4 mg) by mouth every 8 hours as needed for nausea 30 tablet 1    respiratory syncytial virus vaccine, bivalent (ABRYSVO) injection Inject 0.5 mLs into the muscle once for 1 dose 0.5 mL 0    vitamin A-vitamin C-vit E-min (OCUVITE) Tab tablet Take 1 tablet by mouth daily       Allergies   Allergen Reactions    Sulfa (Sulfonamide Antibiotics) [Sulfa Antibiotics] Diarrhea and Nausea     Also rash, throat swelling    Incruse Ellipta [Umeclidinium] Angioedema     Mouth and tongue swelling     Social History     Tobacco Use    Smoking status: Former     Types: Cigarettes     Quit date: 12/5/2010     " Years since quittin.8     Passive exposure: Past    Smokeless tobacco: Never   Vaping Use    Vaping Use: Never used   Substance Use Topics    Alcohol use: No    Drug use: No             Subjective   Suzanna is a 83 year old, presenting for the following health issues:  Follow Up (FASTING, FLU AND COVID.)      10/26/2023     8:49 AM   Additional Questions   Roomed by Kendy COOK   Accompanied by richie         10/26/2023     8:49 AM   Patient Reported Additional Medications   Patient reports taking the following new medications no       History of Present Illness       COPD:  She presents for follow up of COPD.   Overall, COPD symptoms are slightly worse since last visit. She has same as usual fatigue or shortness of breath with exertion and same as usual shortness of breath at rest.  She rarely coughs and does not have change in sputum. No recent fever. She can walk less than 1 block without stopping to rest. She can not walk a flight of stairs without resting. The patient has had no ED, urgent care, or hospital admissions because of COPD since the last visit.     She eats 0-1 servings of fruits and vegetables daily.She consumes 0 sweetened beverage(s) daily.She exercises with enough effort to increase her heart rate 9 or less minutes per day.  She exercises with enough effort to increase her heart rate 3 or less days per week.   She is taking medications regularly.                 Review of Systems         Objective    /66   Pulse 72   Temp 97.8  F (36.6  C)   Resp 18   Ht 1.613 m (5' 3.5\")   Wt 47.6 kg (105 lb)   LMP  (LMP Unknown)   SpO2 91%   BMI 18.31 kg/m    Body mass index is 18.31 kg/m .  Physical Exam                         "

## 2023-11-01 NOTE — PATIENT INSTRUCTIONS
No medication changes today.    You have been given a azithromycin Z-Pedro to use in case of future respiratory infection.    Try to avoid use of ibuprofen, as that does put you at risk for kidney injury and affecting blood pressure.    Make sure your blood pressure continues to run less than 150/80.  Contact me if blood pressure is getting too high, or if it is running too low, less than 110/60 or lightheaded dizzy spells.    Use the lorazepam only for significant anxiety spells.  Lorazepam can affect memory, cause confusion and increased fall risk.    Follow-up in 4 months for routine checkup  
DISPLAY PLAN FREE TEXT

## 2024-01-01 ENCOUNTER — TELEPHONE (OUTPATIENT)
Dept: INTERNAL MEDICINE | Facility: CLINIC | Age: 84
End: 2024-01-01
Payer: MEDICARE

## 2024-01-01 ENCOUNTER — APPOINTMENT (OUTPATIENT)
Dept: INTERNAL MEDICINE | Facility: CLINIC | Age: 84
End: 2024-01-01
Payer: MEDICARE

## 2024-01-01 ENCOUNTER — MEDICAL CORRESPONDENCE (OUTPATIENT)
Dept: HEALTH INFORMATION MANAGEMENT | Facility: CLINIC | Age: 84
End: 2024-01-01

## 2024-01-01 ENCOUNTER — DOCUMENTATION ONLY (OUTPATIENT)
Dept: INTERNAL MEDICINE | Facility: CLINIC | Age: 84
End: 2024-01-01
Payer: MEDICARE

## 2024-01-01 ENCOUNTER — TELEPHONE (OUTPATIENT)
Dept: INTERNAL MEDICINE | Facility: CLINIC | Age: 84
End: 2024-01-01

## 2024-01-01 DIAGNOSIS — J96.11 CHRONIC RESPIRATORY FAILURE WITH HYPOXIA (H): ICD-10-CM

## 2024-01-01 DIAGNOSIS — I27.20 PULMONARY HYPERTENSION, UNSPECIFIED (H): ICD-10-CM

## 2024-01-01 DIAGNOSIS — F41.9 ANXIETY: ICD-10-CM

## 2024-01-01 DIAGNOSIS — J44.9 CHRONIC OBSTRUCTIVE PULMONARY DISEASE, UNSPECIFIED COPD TYPE (H): Primary | ICD-10-CM

## 2024-01-01 DIAGNOSIS — R11.0 NAUSEA: ICD-10-CM

## 2024-01-01 DIAGNOSIS — I10 ESSENTIAL HYPERTENSION: ICD-10-CM

## 2024-01-01 DIAGNOSIS — J44.9 CHRONIC OBSTRUCTIVE PULMONARY DISEASE, UNSPECIFIED COPD TYPE (H): ICD-10-CM

## 2024-01-01 DIAGNOSIS — R11.0 NAUSEA: Primary | ICD-10-CM

## 2024-01-01 RX ORDER — AMLODIPINE BESYLATE 5 MG/1
5 TABLET ORAL DAILY
Qty: 90 TABLET | Refills: 3 | Status: SHIPPED | OUTPATIENT
Start: 2024-01-01

## 2024-01-01 RX ORDER — ONDANSETRON 4 MG/1
4 TABLET, ORALLY DISINTEGRATING ORAL EVERY 8 HOURS PRN
Qty: 30 TABLET | Refills: 1 | Status: SHIPPED | OUTPATIENT
Start: 2024-01-01

## 2024-01-01 RX ORDER — ONDANSETRON 8 MG/1
8 TABLET, ORALLY DISINTEGRATING ORAL EVERY 8 HOURS PRN
Qty: 30 TABLET | Refills: 1 | Status: SHIPPED | OUTPATIENT
Start: 2024-01-01

## 2024-01-01 RX ORDER — BUDESONIDE AND FORMOTEROL FUMARATE DIHYDRATE 160; 4.5 UG/1; UG/1
2 AEROSOL RESPIRATORY (INHALATION) 2 TIMES DAILY
Qty: 10.2 G | Refills: 11 | Status: SHIPPED | OUTPATIENT
Start: 2024-01-01

## 2024-01-01 RX ORDER — LISINOPRIL 40 MG/1
40 TABLET ORAL DAILY
Qty: 90 TABLET | Refills: 3 | Status: SHIPPED | OUTPATIENT
Start: 2024-01-01 | End: 2024-01-01

## 2024-01-01 RX ORDER — LORAZEPAM 0.5 MG/1
0.5 TABLET ORAL 2 TIMES DAILY PRN
Qty: 60 TABLET | Refills: 0 | Status: SHIPPED | OUTPATIENT
Start: 2024-01-01

## 2024-01-29 NOTE — TELEPHONE ENCOUNTER
I sent refill on amlodipine, lisinopril, duloxetine and her Symbicort inhaler 2 patient's pharmacy per request of the daughter-in-law

## 2024-02-05 NOTE — TELEPHONE ENCOUNTER
I called patient today and have been in contact with the patient's son.  She has been having GI symptoms for the past 2 weeks with weight loss and decreased strength.    Her blood pressure has been lower and she has been taken off lisinopril and feeling better today.    She has underlying severe COPD and global deconditioning.  Patient is requesting hospice consult for comfort and anxiety and breathlessness management.    Lisinopril has been discontinued and will remain off of it.  I will plan to follow-up with telephone visit later this week.  I would recommend patient proceed with hospice consult for further information and family consult.  And follow-up appointment with me as needed.    Schedule a telephone call with me for Wednesday or next Friday.    I believe hospice is going to consult with them next Thursday.

## 2024-02-05 NOTE — TELEPHONE ENCOUNTER
Incoming call from RN [facility],     Relayed provider message and reason of request.   Caller report they will need more information on why pt is on hospice since her last note on 10/26/24 seem like she is doing fine.     Pt may need a more recent visit with primary provider.   As of now, caller cannot accommodate pt to be accepted today or this week.     Caller will call family to gather more information.   No further action needed from clinic at this time.     Pay P. RN

## 2024-02-05 NOTE — TELEPHONE ENCOUNTER
Patient is requesting hospice from [facility], to start today if possible.  Patient is at [facility] in [place] on [address].  Patient has progressed on her emphysema/COPD and wishes comfort measures at home.  Issues of anxiety and shortness of breath currently.  Requesting hospice nursing evaluation today.

## 2024-02-05 NOTE — TELEPHONE ENCOUNTER
I called [facility], had to leave a message. Waiting for a call back for more information to get patient started with Hospice comfort care at home.

## 2024-02-07 NOTE — TELEPHONE ENCOUNTER
Writer verified with tony Carrion has a hospice consult with a RN on 02/08/2024 at 0900.    Allie Taylor RN

## 2024-02-07 NOTE — TELEPHONE ENCOUNTER
[facility] calling. The number that they were previously contacted at before is for [facility], not their community home hospice.     Call the main number and   [phone], ask for Sheyla to assist with assigning a nurse for an in home hospice consult.     Allie Taylor RN

## 2024-02-08 NOTE — TELEPHONE ENCOUNTER
I called patient at home this morning.  She is feeling somewhat better with resolution of diarrhea.  She is still not eating very much, just crackers and some Ensure.  She denies pain.    She has mild anxiety and nausea.  She wanted a refill of Zofran.  She does not have lorazepam at this time, but has used that in the past with anxiety.  Continues on Prozac.    Patient could consider lorazepam as needed for anxiety with hospice.    Has a meeting with hospice this morning.  She does wish to proceed with hospice.  She declines clinic visit for further evaluation at this time.    I encouraged her to continue to eat and move as much as able.    Zofran refilled    Will await the hospice meeting today.    She denies significant worsening dyspnea at this time and has her inhalers.    She complains of profound weakness and deconditioning and global decline with failure to thrive and she does not want to consider further evaluation or tube feedings.  Goal for comfort care, and will meet with hospice today.    I will keep the telephone appointment for Friday.    She will stay off the lisinopril.  She will continue on the amlodipine.  Her blood pressure 2 days ago was reported as 130 systolic.

## 2024-02-09 NOTE — TELEPHONE ENCOUNTER
I called patient at home this morning.  She met with hospice yesterday, which was a good visit and she will continue with hospice program.    Patient wanted to cancel the telephone appointment later today and did not want to set up a follow-up appointment.  I will continue to check in with patient on the phone.    He denies any pain.  She was up going to the bathroom.  She still active going to the bathroom about her apartment but not leaving her apartment.    She denies any decubitus ulcers or skin sores.  She denies constipation or abdominal pain.    Her dyspnea on exertion is moderate but stable with her baseline severe COPD.    Minimal oral intake but eating some.  Complaining of nausea.  I did refill the Zofran.  She does have some moderate anxiety and did request lorazepam which she is not currently using.  I did warn her on risk of lorazepam affecting her breathing and reducing her activity to the point of possibly causing a decubitus ulcer and avoid oversedation.    Message sent to patient's son and daughter-in-law with update.

## 2024-02-13 NOTE — TELEPHONE ENCOUNTER
I called patient.  She feels she is doing better today.  She is walking to the bathroom, no falls.  No significant pain and moves slowly.  She took 2 lorazepam last night and slept well, feels that is working well for her.  Denies worsening shortness of breath.  Denies any pressure sores.  Discussed family and her interest in strongly.    Eating some Jell-O and cookies and grapes and Gatorade but is not interested in eating additional.  Does not want Ensure.  Denies significant nausea.    Will have hospice intake today at 11 AM.  Patient again wishes to proceed with hospice.    GI symptoms have stabilized but global failure to thrive and deconditioning still present.  Severe COPD stable.

## 2024-04-08 ENCOUNTER — PATIENT OUTREACH (OUTPATIENT)
Dept: CARE COORDINATION | Facility: CLINIC | Age: 84
End: 2024-04-08
Payer: MEDICARE

## 2024-04-09 ENCOUNTER — MEDICAL CORRESPONDENCE (OUTPATIENT)
Dept: HEALTH INFORMATION MANAGEMENT | Facility: CLINIC | Age: 84
End: 2024-04-09

## 2024-04-22 ENCOUNTER — PATIENT OUTREACH (OUTPATIENT)
Dept: CARE COORDINATION | Facility: CLINIC | Age: 84
End: 2024-04-22
Payer: MEDICARE